# Patient Record
Sex: FEMALE | Race: WHITE | NOT HISPANIC OR LATINO | Employment: OTHER | URBAN - METROPOLITAN AREA
[De-identification: names, ages, dates, MRNs, and addresses within clinical notes are randomized per-mention and may not be internally consistent; named-entity substitution may affect disease eponyms.]

---

## 2017-01-03 ENCOUNTER — OFFICE VISIT (OUTPATIENT)
Dept: RHEUMATOLOGY | Facility: CLINIC | Age: 67
End: 2017-01-03
Payer: MEDICARE

## 2017-01-03 VITALS
DIASTOLIC BLOOD PRESSURE: 80 MMHG | HEART RATE: 72 BPM | WEIGHT: 197.88 LBS | HEIGHT: 66 IN | SYSTOLIC BLOOD PRESSURE: 130 MMHG | BODY MASS INDEX: 31.8 KG/M2

## 2017-01-03 DIAGNOSIS — M79.7 FIBROMYALGIA: ICD-10-CM

## 2017-01-03 DIAGNOSIS — M19.90 OTHER TYPE OF OSTEOARTHRITIS, UNSPECIFIED SITE: ICD-10-CM

## 2017-01-03 DIAGNOSIS — L40.50 PSORIATIC ARTHRITIS: ICD-10-CM

## 2017-01-03 DIAGNOSIS — D84.9 IMMUNE DEFICIENCY DISORDER: ICD-10-CM

## 2017-01-03 DIAGNOSIS — M19.90 INFLAMMATORY ARTHRITIS: Primary | ICD-10-CM

## 2017-01-03 DIAGNOSIS — M02.39 REITER'S DISEASE OF MULTIPLE SITES: ICD-10-CM

## 2017-01-03 PROCEDURE — 1159F MED LIST DOCD IN RCRD: CPT | Mod: S$GLB,,, | Performed by: INTERNAL MEDICINE

## 2017-01-03 PROCEDURE — 99214 OFFICE O/P EST MOD 30 MIN: CPT | Mod: S$GLB,,, | Performed by: INTERNAL MEDICINE

## 2017-01-03 PROCEDURE — 99999 PR PBB SHADOW E&M-EST. PATIENT-LVL III: CPT | Mod: PBBFAC,,, | Performed by: INTERNAL MEDICINE

## 2017-01-03 RX ORDER — FOLIC ACID 1 MG/1
1 TABLET ORAL DAILY
Qty: 30 TABLET | Refills: 5 | Status: SHIPPED | OUTPATIENT
Start: 2017-01-03 | End: 2018-10-29

## 2017-01-03 ASSESSMENT — ROUTINE ASSESSMENT OF PATIENT INDEX DATA (RAPID3)
PSYCHOLOGICAL DISTRESS SCORE: 4.4
PATIENT GLOBAL ASSESSMENT SCORE: 3
TOTAL RAPID3 SCORE: 3.78
PAIN SCORE: 5
MDHAQ FUNCTION SCORE: 1

## 2017-01-03 NOTE — PROGRESS NOTES
Subjective:          Chief Complaint: Adina Li is a 66 y.o. female who had concerns including Disease Management.    HPI:    Patient is a 66-year-old female here for f/u of PsA vs EOA (+hx of psoriasis but no active plaques) and fibromyalgia.  Serologies: CCP negative,  rheumatoid factor negative, CRP and ESR within normal limits. iron and TIBC were normal ferritin was normal and hepatitis was normal.  His last visit I did start her on hydroxychloroquinedoing well having pain erythema at right 3rd and 4th DIP with slight swelling.   Imaging with erosive changes at the capitate and DIP.        She previously started on methotrexate of 5 tabs weekly as well as Savella.She was on MTX years ago with little note of improvement.   Is a past medical history for type 2 diabetes mellitus, hypertension, IBS, previous myocardial infarction and diverticulitis. She has DIP pain, swelling and deformity. Some PIP, little MCP and some wrist tenderness. She has long hx of arthritis with bilateral TKA. She has hx of Lumbar DDD/DJD and cervical DDD with [facility]-Dr. Sim. Patient notes this started as a young woman 20s and 30s. She was rather active. She has some pain stiffness in shoulders. Currently right ring finger burns and stiff. Described as ache. he is taking Advil PM helps with pain and for sleep, chondroitin sulfate or move free. Recently started [place] water and feeling much better. She is s/p CAD with stenting x 2 and sister with MI  at 45y/o. She is cautious with NSAIDs. Patient with some dry mouth, no dry eyes, no serositis, pleurisy, ILD. ? Psoriasis scalp.  Patient is a history of iron overload with therapeutic phlebotomies.  Cannot recall she was diagnosed with hemochromatosis.  She does recall hepatitis exposure.  She denies a malar rash discoid lesions oral palatal ulcerations no unexplained anemias.    Reviewed imaging in detail.     Component      Latest Ref Rng & Units 2016   Hepatitis B Surface Ag    "    Negative   Hep B C IgM       Negative   Hep A IgM       Negative   Hepatitis C Ab       Negative   Sed Rate      0 - 20 mm/Hr 5   CRP      0.0 - 8.2 mg/L 2.0   CCP Antibodies      <5.0 U/mL <0.5       REVIEW OF SYSTEMS:    Review of Systems   Constitutional: Negative for fever, malaise/fatigue and weight loss.   HENT: Negative for sore throat.    Eyes: Negative for double vision, photophobia and redness.   Respiratory: Negative for cough, shortness of breath and wheezing.    Cardiovascular: Negative for chest pain, palpitations and orthopnea.   Gastrointestinal: Negative for abdominal pain, constipation and diarrhea.   Genitourinary: Negative for dysuria, hematuria and urgency.   Musculoskeletal: Positive for back pain and joint pain. Negative for myalgias.   Skin: Negative for rash.   Neurological: Negative for dizziness, tingling, focal weakness and headaches.   Endo/Heme/Allergies: Does not bruise/bleed easily.   Psychiatric/Behavioral: Negative for depression, hallucinations and suicidal ideas.               Objective:            Past Medical History   Diagnosis Date    Acid reflux     Anxiety     Arthritis     Diabetes mellitus     Fibromyalgia     Hypertension     IBS (irritable bowel syndrome)     Myocardial infarction     Ulcerative colitis      History reviewed. No pertinent family history.  Social History   Substance Use Topics    Smoking status: Former Smoker     Quit date: 5/8/2005    Smokeless tobacco: None    Alcohol use No         Current Outpatient Prescriptions on File Prior to Visit   Medication Sig Dispense Refill    alprazolam (XANAX) 0.5 MG tablet Take 0.5 mg by mouth nightly as needed for Insomnia or Anxiety.      aspirin 81 MG Chew Take 81 mg by mouth once daily.      BD INSULIN PEN NEEDLE UF MINI 31 x 3/16 " Ndle   11    cinnamon bark (CINNAMON) 500 mg capsule Take 500 mg by mouth once daily.      co-enzyme Q-10 30 mg capsule Take 100 mg by mouth once daily.      " cranberry 500 mg Cap Take 500 mg by mouth once daily.      diphenoxylate-atropine 2.5-0.025 mg (LOMOTIL) 2.5-0.025 mg per tablet   1    duloxetine (CYMBALTA) 30 MG capsule Take 1 capsule (30 mg total) by mouth once daily. At night 30 capsule 11    estradiol (CLIMARA) 0.075 mg/24 hr   4    ferrous sulfate 325 mg (65 mg iron) Tab tablet Take 325 mg by mouth once daily.      folic acid (FOLVITE) 1 MG tablet Take 1 tablet (1 mg total) by mouth once daily. 30 tablet 5    gabapentin (NEURONTIN) 300 MG capsule Take 300 mg by mouth 2 (two) times daily.      glimepiride (AMARYL) 4 MG tablet Take 4 mg by mouth 2 (two) times daily.      glucosamine-chondroitin 500-400 mg tablet Take 1 tablet by mouth once daily.      hydrocodone-acetaminophen 5-325mg (NORCO) 5-325 mg per tablet Take 1 tablet by mouth nightly as needed for Pain.      insulin glargine (LANTUS) 100 unit/mL injection Inject 24 Units into the skin once daily.       insulin lispro (HUMALOG) 100 unit/mL injection Inject 2 Units into the skin as needed for High Blood Sugar.      krill-om3-dha-epa-om6-lip-astx (KRILL OIL, OMEGA 3 AND 6,) 1,500-165-67.5 mg Cap Take by mouth once daily.      metformin (GLUCOPHAGE) 1000 MG tablet Take 1,000 mg by mouth 2 (two) times daily with meals.      metoprolol succinate (TOPROL-XL) 25 MG 24 hr tablet Take 25 mg by mouth once daily.      multivitamin (THERAGRAN) per tablet Take 1 tablet by mouth once daily.      pitavastatin (LIVALO) 4 mg Tab Take by mouth once daily.      sitagliptin (JANUVIA) 100 MG Tab Take 100 mg by mouth once daily.      solifenacin (VESICARE) 10 MG tablet Take 5 mg by mouth every evening.      thyroid 30 mg Tab Take 30 mg by mouth once daily.      vitamin D 1000 units Tab Take 185 mg by mouth once daily.       No current facility-administered medications on file prior to visit.        Vitals:    01/03/17 0747   BP: 130/80   Pulse: 72       Physical Exam:    Physical Exam   Constitutional:  She appears well-developed and well-nourished.   HENT:   Nose: No septal deviation.   Mouth/Throat: Mucous membranes are normal. No oral lesions.   Eyes: Pupils are equal, round, and reactive to light. Right conjunctiva is not injected. Left conjunctiva is not injected.   Neck: No JVD present. No thyroid mass and no thyromegaly present.   Cardiovascular: Normal rate, regular rhythm and normal pulses.    No edema   Pulmonary/Chest: Effort normal and breath sounds normal.   Abdominal: Soft. Normal appearance. There is no hepatosplenomegaly.   Musculoskeletal:        Right shoulder: She exhibits normal range of motion, no tenderness and no swelling.        Left shoulder: She exhibits normal range of motion, no tenderness and no swelling.        Right elbow: She exhibits normal range of motion and no swelling. No tenderness found.        Left elbow: She exhibits normal range of motion and no swelling. No tenderness found.        Right wrist: She exhibits normal range of motion, no tenderness and no swelling.        Left wrist: She exhibits normal range of motion, no tenderness and no swelling.        Right hip: She exhibits normal range of motion, normal strength and no swelling.        Left hip: She exhibits normal range of motion, no tenderness and no swelling.        Right knee: She exhibits normal range of motion and no swelling. No tenderness found.        Left knee: She exhibits normal range of motion and no swelling. No tenderness found.        Right ankle: She exhibits normal range of motion and no swelling. No tenderness.        Left ankle: She exhibits normal range of motion and no swelling. No tenderness.        Right hand: She exhibits decreased range of motion, tenderness and deformity.        Left hand: She exhibits decreased range of motion, tenderness and deformity.        Left foot: There is tenderness and swelling.   Patient is a DIP bony hypertrophy with deformity particular the second DIP  bilaterally.  She has slight erythema at the third fourth and fifth right DIP she's difficulty with finger curl  strength reduced . no active synovitis in the wrist MCPs or PIPs.    Midfoot dorsal extensor tendons slight swelling and tenderness   Lymphadenopathy:     She has no cervical adenopathy.     She has no axillary adenopathy.   Neurological: She has normal strength and normal reflexes.   Skin: Skin is dry and intact.   Psychiatric: She has a normal mood and affect.             Assessment:       Encounter Diagnoses   Name Primary?    Inflammatory arthritis Yes    Other type of osteoarthritis, unspecified site     Cady's disease of multiple sites     Fibromyalgia     Immune deficiency disorder     Psoriatic arthritis           Plan:        Inflammatory arthritis  -     methotrexate 10 MG tablet; Take 1 tablet (10 mg total) by mouth once a week.  Dispense: 4 tablet; Refill: 4  -     folic acid (FOLVITE) 1 MG tablet; Take 1 tablet (1 mg total) by mouth once daily.  Dispense: 30 tablet; Refill: 5  -     CBC auto differential; Standing; Expected date: 1/3/17  -     Comprehensive metabolic panel; Standing; Expected date: 1/3/17  -     Sedimentation rate, manual; Standing; Expected date: 1/3/17  -     C-reactive protein; Standing; Expected date: 1/3/17    Other type of osteoarthritis, unspecified site  -     folic acid (FOLVITE) 1 MG tablet; Take 1 tablet (1 mg total) by mouth once daily.  Dispense: 30 tablet; Refill: 5    Cady's disease of multiple sites    Fibromyalgia    Immune deficiency disorder  -     folic acid (FOLVITE) 1 MG tablet; Take 1 tablet (1 mg total) by mouth once daily.  Dispense: 30 tablet; Refill: 5    Psoriatic arthritis  -     folic acid (FOLVITE) 1 MG tablet; Take 1 tablet (1 mg total) by mouth once daily.  Dispense: 30 tablet; Refill: 5  -     CBC auto differential; Standing; Expected date: 1/3/17  -     Comprehensive metabolic panel; Standing; Expected date: 1/3/17  -      Sedimentation rate, manual; Standing; Expected date: 1/3/17  -     C-reactive protein; Standing; Expected date: 1/3/17    Continue HCQ  Start MTX for persistent swelling DIP joints/suspect Psoriatic arthritis  EMLA  Did not offer significant relief.     Return in about 3 months (around 4/3/2017).          30min consultation with greater than 50% spent in counseling, chart review and coordination of care. All questions answered.

## 2017-01-03 NOTE — MR AVS SNAPSHOT
[facility]  [address] [facility] [address] [place] [address]  Phone: [phone]  Fax: [phone]                  Aidna Li   1/3/2017 8:00 AM   Office Visit    Description:  Female : 1950   Provider:  Beatriz Guevara DO   Department:  [facility] - Rheumatology           Reason for Visit     Disease Management           Diagnoses this Visit        Comments    Inflammatory arthritis    -  Primary     Other type of osteoarthritis, unspecified site         Cady's disease of multiple sites         Fibromyalgia         Immune deficiency disorder         Psoriatic arthritis                To Do List           Future Appointments        Provider Department Dept Phone    3/1/2017 10:20 AM LABORATORY, [place] [facility] [phone]    4/3/2017 8:30 AM Beatriz Guevara DO [facility] [phone]    2017 10:00 AM LABORATORY, [place] [facility] [phone]      Goals (5 Years of Data)     None      Follow-Up and Disposition     Return in about 3 months (around 4/3/2017).       These Medications        Disp Refills Start End    methotrexate 10 MG tablet 4 tablet 4 1/3/2017 1/3/2018    Take 1 tablet (10 mg total) by mouth once a week. - Oral    Pharmacy: [facility] [place], [place] - [address] [address] Ph #: [phone]       folic acid (FOLVITE) 1 MG tablet 30 tablet 5 1/3/2017 1/3/2018    Take 1 tablet (1 mg total) by mouth once daily. - Oral    Pharmacy: [facility] [place]  [address] Ph #: [phone]         [facility]     [facility] -  Assistance  Registered nurses in the [facility] provide clinical advisement, health education, appointment booking, and other advisory services.  Call for this free service at [phone].             Medications           Message regarding Medications     Verify the changes and/or additions to your  "medication regime listed below are the same as discussed with your clinician today.  If any of these changes or additions are incorrect, please notify your healthcare provider.        START taking these NEW medications        Refills    methotrexate 10 MG tablet 4    Sig: Take 1 tablet (10 mg total) by mouth once a week.    Class: Normal    Route: Oral           Verify that the below list of medications is an accurate representation of the medications you are currently taking.  If none reported, the list may be blank. If incorrect, please contact your healthcare provider. Carry this list with you in case of emergency.           Current Medications     alprazolam (XANAX) 0.5 MG tablet Take 0.5 mg by mouth nightly as needed for Insomnia or Anxiety.    aspirin 81 MG Chew Take 81 mg by mouth once daily.    BD INSULIN PEN NEEDLE UF MINI 31 x 3/16 " Ndle     cinnamon bark (CINNAMON) 500 mg capsule Take 500 mg by mouth once daily.    co-enzyme Q-10 30 mg capsule Take 100 mg by mouth once daily.    cranberry 500 mg Cap Take 500 mg by mouth once daily.    diphenoxylate-atropine 2.5-0.025 mg (LOMOTIL) 2.5-0.025 mg per tablet     duloxetine (CYMBALTA) 30 MG capsule Take 1 capsule (30 mg total) by mouth once daily. At night    estradiol (CLIMARA) 0.075 mg/24 hr     ferrous sulfate 325 mg (65 mg iron) Tab tablet Take 325 mg by mouth once daily.    folic acid (FOLVITE) 1 MG tablet Take 1 tablet (1 mg total) by mouth once daily.    gabapentin (NEURONTIN) 300 MG capsule Take 300 mg by mouth 2 (two) times daily.    glimepiride (AMARYL) 4 MG tablet Take 4 mg by mouth 2 (two) times daily.    glucosamine-chondroitin 500-400 mg tablet Take 1 tablet by mouth once daily.    hydrocodone-acetaminophen 5-325mg (NORCO) 5-325 mg per tablet Take 1 tablet by mouth nightly as needed for Pain.    insulin glargine (LANTUS) 100 unit/mL injection Inject 24 Units into the skin once daily.     insulin lispro (HUMALOG) 100 unit/mL injection Inject 2 " "Units into the skin as needed for High Blood Sugar.    krill-om3-dha-epa-om6-lip-astx (KRILL OIL, OMEGA 3 AND 6,) 1,500-165-67.5 mg Cap Take by mouth once daily.    metformin (GLUCOPHAGE) 1000 MG tablet Take 1,000 mg by mouth 2 (two) times daily with meals.    methotrexate 10 MG tablet Take 1 tablet (10 mg total) by mouth once a week.    metoprolol succinate (TOPROL-XL) 25 MG 24 hr tablet Take 25 mg by mouth once daily.    multivitamin (THERAGRAN) per tablet Take 1 tablet by mouth once daily.    pitavastatin (LIVALO) 4 mg Tab Take by mouth once daily.    sitagliptin (JANUVIA) 100 MG Tab Take 100 mg by mouth once daily.    solifenacin (VESICARE) 10 MG tablet Take 5 mg by mouth every evening.    thyroid 30 mg Tab Take 30 mg by mouth once daily.    vitamin D 1000 units Tab Take 185 mg by mouth once daily.           Clinical Reference Information           Vital Signs - Last Recorded  Most recent update: 1/3/2017  7:55 AM by Nhi Pacheco LPN    BP Pulse Ht Wt BMI    130/80 72 5' 5.5" (1.664 m) 89.8 kg (197 lb 14.4 oz) 32.43 kg/m2      Blood Pressure          Most Recent Value    BP  130/80      Allergies as of 1/3/2017     Sulfa (Sulfonamide Antibiotics)      Immunizations Administered on Date of Encounter - 1/3/2017     None      Orders Placed During Today's Visit     Recurring Lab Work Interval Expires    C-reactive protein   1/3/2018    CBC auto differential   1/3/2018    Comprehensive metabolic panel   1/3/2018    Sedimentation rate, manual   1/3/2018      [facility] Sign-Up     Activating your [facility] account is as easy as 1-2-3!     1) Visit [email or web address], select Sign Up Now, enter this activation code and your date of birth, then select Next.  [email or web address]  Expires: 2/17/2017  8:53 AM      2) Create a username and password to use when you visit [facility] in the future and select a security question in case you lose your password and select Next.    3) Enter your e-mail address and click Sign " Up!    Additional Information  If you have questions, please e-mail [email or web address] or call [phone] to talk to our [facility] staff. Remember, [facility] is NOT to be used for urgent needs. For medical emergencies, dial 911.

## 2017-03-01 ENCOUNTER — LAB VISIT (OUTPATIENT)
Dept: LAB | Facility: HOSPITAL | Age: 67
End: 2017-03-01
Attending: INTERNAL MEDICINE
Payer: MEDICARE

## 2017-03-01 DIAGNOSIS — M19.90 INFLAMMATORY ARTHRITIS: ICD-10-CM

## 2017-03-01 DIAGNOSIS — L40.50 PSORIATIC ARTHRITIS: ICD-10-CM

## 2017-03-01 LAB
ALBUMIN SERPL BCP-MCNC: 3.7 G/DL
ALP SERPL-CCNC: 43 U/L
ALT SERPL W/O P-5'-P-CCNC: 33 U/L
ANION GAP SERPL CALC-SCNC: 10 MMOL/L
AST SERPL-CCNC: 29 U/L
BASOPHILS # BLD AUTO: 0.05 K/UL
BASOPHILS NFR BLD: 0.8 %
BILIRUB SERPL-MCNC: 0.6 MG/DL
BUN SERPL-MCNC: 10 MG/DL
CALCIUM SERPL-MCNC: 9.1 MG/DL
CHLORIDE SERPL-SCNC: 99 MMOL/L
CO2 SERPL-SCNC: 24 MMOL/L
CREAT SERPL-MCNC: 0.8 MG/DL
CRP SERPL-MCNC: 1.4 MG/L
DIFFERENTIAL METHOD: NORMAL
EOSINOPHIL # BLD AUTO: 0.2 K/UL
EOSINOPHIL NFR BLD: 2.8 %
ERYTHROCYTE [DISTWIDTH] IN BLOOD BY AUTOMATED COUNT: 13.4 %
ERYTHROCYTE [SEDIMENTATION RATE] IN BLOOD BY WESTERGREN METHOD: 8 MM/HR
EST. GFR  (AFRICAN AMERICAN): >60 ML/MIN/1.73 M^2
EST. GFR  (NON AFRICAN AMERICAN): >60 ML/MIN/1.73 M^2
GLUCOSE SERPL-MCNC: 169 MG/DL
HCT VFR BLD AUTO: 40.5 %
HGB BLD-MCNC: 13.9 G/DL
LYMPHOCYTES # BLD AUTO: 1.9 K/UL
LYMPHOCYTES NFR BLD: 28.7 %
MCH RBC QN AUTO: 30.7 PG
MCHC RBC AUTO-ENTMCNC: 34.3 %
MCV RBC AUTO: 89 FL
MONOCYTES # BLD AUTO: 0.7 K/UL
MONOCYTES NFR BLD: 10.1 %
NEUTROPHILS # BLD AUTO: 3.7 K/UL
NEUTROPHILS NFR BLD: 57.3 %
PLATELET # BLD AUTO: 279 K/UL
PMV BLD AUTO: 11 FL
POTASSIUM SERPL-SCNC: 4.5 MMOL/L
PROT SERPL-MCNC: 7.2 G/DL
RBC # BLD AUTO: 4.53 M/UL
SODIUM SERPL-SCNC: 133 MMOL/L
WBC # BLD AUTO: 6.44 K/UL

## 2017-03-01 PROCEDURE — 86140 C-REACTIVE PROTEIN: CPT

## 2017-03-01 PROCEDURE — 85025 COMPLETE CBC W/AUTO DIFF WBC: CPT

## 2017-03-01 PROCEDURE — 80053 COMPREHEN METABOLIC PANEL: CPT

## 2017-03-01 PROCEDURE — 36415 COLL VENOUS BLD VENIPUNCTURE: CPT | Mod: PO

## 2017-03-01 PROCEDURE — 85651 RBC SED RATE NONAUTOMATED: CPT | Mod: PO

## 2017-03-15 ENCOUNTER — TELEPHONE (OUTPATIENT)
Dept: RHEUMATOLOGY | Facility: CLINIC | Age: 67
End: 2017-03-15

## 2017-03-15 NOTE — TELEPHONE ENCOUNTER
----- Message from Cynthia Alvarado sent at 3/15/2017  9:41 AM CDT -----  Contact: Adina  Patient missed a phone call but said she did not have a message. If she was called by this office please [phone] (home) thank you!

## 2017-04-03 ENCOUNTER — OFFICE VISIT (OUTPATIENT)
Dept: RHEUMATOLOGY | Facility: CLINIC | Age: 67
End: 2017-04-03
Payer: MEDICARE

## 2017-04-03 VITALS
BODY MASS INDEX: 31.43 KG/M2 | SYSTOLIC BLOOD PRESSURE: 123 MMHG | DIASTOLIC BLOOD PRESSURE: 68 MMHG | HEART RATE: 65 BPM | WEIGHT: 191.81 LBS

## 2017-04-03 DIAGNOSIS — M19.90 INFLAMMATORY ARTHRITIS: ICD-10-CM

## 2017-04-03 DIAGNOSIS — M19.90 OTHER TYPE OF OSTEOARTHRITIS, UNSPECIFIED SITE: ICD-10-CM

## 2017-04-03 DIAGNOSIS — Z79.899 HIGH RISK MEDICATIONS (NOT ANTICOAGULANTS) LONG-TERM USE: Primary | ICD-10-CM

## 2017-04-03 DIAGNOSIS — M79.7 FIBROMYALGIA: ICD-10-CM

## 2017-04-03 PROCEDURE — 1160F RVW MEDS BY RX/DR IN RCRD: CPT | Mod: S$GLB,,, | Performed by: INTERNAL MEDICINE

## 2017-04-03 PROCEDURE — 99214 OFFICE O/P EST MOD 30 MIN: CPT | Mod: S$GLB,,, | Performed by: INTERNAL MEDICINE

## 2017-04-03 PROCEDURE — 99999 PR PBB SHADOW E&M-EST. PATIENT-LVL III: CPT | Mod: PBBFAC,,, | Performed by: INTERNAL MEDICINE

## 2017-04-03 RX ORDER — PRAVASTATIN SODIUM 20 MG/1
20 TABLET ORAL DAILY
COMMUNITY
Start: 2017-03-21

## 2017-04-03 RX ORDER — NITROGLYCERIN 0.4 MG/1
TABLET SUBLINGUAL
Refills: 1 | COMMUNITY
Start: 2017-01-11

## 2017-04-03 RX ORDER — INSULIN ASPART 100 [IU]/ML
INJECTION, SOLUTION INTRAVENOUS; SUBCUTANEOUS
COMMUNITY
Start: 2017-03-28 | End: 2017-04-03

## 2017-04-03 RX ORDER — LEFLUNOMIDE 10 MG/1
10 TABLET ORAL DAILY
Qty: 90 TABLET | Refills: 3 | Status: SHIPPED | OUTPATIENT
Start: 2017-04-03 | End: 2017-04-03 | Stop reason: SDUPTHER

## 2017-04-03 RX ORDER — LEFLUNOMIDE 10 MG/1
10 TABLET ORAL DAILY
Qty: 30 TABLET | Refills: 3 | Status: SHIPPED | OUTPATIENT
Start: 2017-04-03 | End: 2017-07-06 | Stop reason: SDUPTHER

## 2017-04-03 RX ORDER — NABUMETONE 500 MG/1
500 TABLET, FILM COATED ORAL DAILY
COMMUNITY
Start: 2017-02-01 | End: 2018-10-29

## 2017-04-03 RX ORDER — VENLAFAXINE HYDROCHLORIDE 37.5 MG/1
37.5 CAPSULE, EXTENDED RELEASE ORAL DAILY
Qty: 30 CAPSULE | Refills: 11 | Status: SHIPPED | OUTPATIENT
Start: 2017-04-03 | End: 2017-07-06 | Stop reason: CLARIF

## 2017-04-03 RX ORDER — VITAMIN B COMPLEX
1 CAPSULE ORAL DAILY
COMMUNITY
End: 2018-10-29

## 2017-04-03 RX ORDER — BLOOD SUGAR DIAGNOSTIC
STRIP MISCELLANEOUS
Refills: 3 | COMMUNITY
Start: 2017-03-21 | End: 2017-11-01 | Stop reason: SDUPTHER

## 2017-04-03 ASSESSMENT — ROUTINE ASSESSMENT OF PATIENT INDEX DATA (RAPID3)
PATIENT GLOBAL ASSESSMENT SCORE: 3
FATIGUE SCORE: 3
WHEN YOU AWAKENED IN THE MORNING OVER THE LAST WEEK, PLEASE INDICATE THE AMOUNT OF TIME IT TAKES UNTIL YOU ARE AS LIMBER AS YOU WILL BE FOR THE DAY: ONE HOUR AFTER WAKING
PAIN SCORE: 8
PSYCHOLOGICAL DISTRESS SCORE: 1.1
MDHAQ FUNCTION SCORE: 1.3
AM STIFFNESS SCORE: 1, YES
TOTAL RAPID3 SCORE: 5.11

## 2017-04-03 NOTE — PATIENT INSTRUCTIONS
Stop MTX Today.    If you receive new medication Leflunomide then next labs will be due 8 weeks after the start.     If it is too costly plan to wait until July 2017 with new insurance.    I stopped Cymbalta and wrote for Effexor the minute you receive just stop Cymbalta and start ne med.

## 2017-04-03 NOTE — PROGRESS NOTES
Subjective:          Chief Complaint: Adina Li is a 66 y.o. female who had concerns including Follow-up.    HPI:    Insurance requesting change of Cymbalta for which she has been on for over 1 year.   Insurance requesting change of gabapentin of which there is no cheaper alternative    [facility] [place] with cardiac work up no occlusive CAD.    Not noting significant benefit with MTX since start. No toxicity on recent labs. Fingers still burn.     REVIEW OF SYSTEMS:    Review of Systems   Constitutional: Negative for fever, malaise/fatigue and weight loss.   HENT: Negative for sore throat.    Eyes: Negative for double vision, photophobia and redness.   Respiratory: Negative for cough, shortness of breath and wheezing.    Cardiovascular: Negative for chest pain, palpitations and orthopnea.   Gastrointestinal: Negative for abdominal pain, constipation and diarrhea.   Genitourinary: Negative for dysuria, hematuria and urgency.   Musculoskeletal: Positive for back pain and joint pain. Negative for myalgias.   Skin: Negative for rash.   Neurological: Negative for dizziness, tingling, focal weakness and headaches.   Endo/Heme/Allergies: Does not bruise/bleed easily.   Psychiatric/Behavioral: Negative for depression, hallucinations and suicidal ideas.               Objective:            Past Medical History:   Diagnosis Date    Acid reflux     Anxiety     Arthritis     Diabetes mellitus     Fibromyalgia     Hypertension     IBS (irritable bowel syndrome)     Myocardial infarction     Ulcerative colitis      No family history on file.  Social History   Substance Use Topics    Smoking status: Former Smoker     Quit date: 5/8/2005    Smokeless tobacco: None    Alcohol use No         Current Outpatient Prescriptions on File Prior to Visit   Medication Sig Dispense Refill    alprazolam (XANAX) 0.5 MG tablet Take 0.5 mg by mouth nightly as needed for Insomnia or Anxiety.      aspirin 81 MG Chew Take 81 mg by mouth  "once daily.      BD INSULIN PEN NEEDLE UF MINI 31 x 3/16 " Ndle   11    cinnamon bark (CINNAMON) 500 mg capsule Take 500 mg by mouth once daily.      co-enzyme Q-10 30 mg capsule Take 100 mg by mouth once daily.      cranberry 500 mg Cap Take 500 mg by mouth once daily.      diphenoxylate-atropine 2.5-0.025 mg (LOMOTIL) 2.5-0.025 mg per tablet   1    estradiol (CLIMARA) 0.075 mg/24 hr   4    ferrous sulfate 325 mg (65 mg iron) Tab tablet Take 325 mg by mouth once daily.      folic acid (FOLVITE) 1 MG tablet Take 1 tablet (1 mg total) by mouth once daily. 30 tablet 5    gabapentin (NEURONTIN) 300 MG capsule Take 300 mg by mouth 2 (two) times daily.      glucosamine-chondroitin 500-400 mg tablet Take 1 tablet by mouth once daily.      hydrocodone-acetaminophen 5-325mg (NORCO) 5-325 mg per tablet Take 1 tablet by mouth nightly as needed for Pain.      krill-om3-dha-epa-om6-lip-astx (KRILL OIL, OMEGA 3 AND 6,) 1,500-165-67.5 mg Cap Take by mouth once daily.      metformin (GLUCOPHAGE) 1000 MG tablet Take 1,000 mg by mouth 2 (two) times daily with meals.      methotrexate 10 MG tablet Take 1 tablet (10 mg total) by mouth once a week. 4 tablet 4    metoprolol succinate (TOPROL-XL) 25 MG 24 hr tablet Take 25 mg by mouth once daily.      multivitamin (THERAGRAN) per tablet Take 1 tablet by mouth once daily.      sitagliptin (JANUVIA) 100 MG Tab Take 100 mg by mouth once daily.      solifenacin (VESICARE) 10 MG tablet Take 10 mg by mouth every evening.       thyroid 30 mg Tab Take 30 mg by mouth once daily.      vitamin D 1000 units Tab Take 185 mg by mouth once daily.      duloxetine (CYMBALTA) 30 MG capsule Take 1 capsule (30 mg total) by mouth once daily. At night 30 capsule 11    [DISCONTINUED] glimepiride (AMARYL) 4 MG tablet Take 4 mg by mouth 2 (two) times daily.      [DISCONTINUED] insulin glargine (LANTUS) 100 unit/mL injection Inject 24 Units into the skin once daily.       [DISCONTINUED] " insulin lispro (HUMALOG) 100 unit/mL injection Inject 2 Units into the skin as needed for High Blood Sugar.      [DISCONTINUED] pitavastatin (LIVALO) 4 mg Tab Take by mouth once daily.       No current facility-administered medications on file prior to visit.        Vitals:    04/03/17 0810   BP: 123/68   Pulse: 65       Physical Exam:    Physical Exam   Constitutional: She appears well-developed and well-nourished.   HENT:   Nose: No septal deviation.   Mouth/Throat: Mucous membranes are normal. No oral lesions.   Eyes: Pupils are equal, round, and reactive to light. Right conjunctiva is not injected. Left conjunctiva is not injected.   Neck: No JVD present. No thyroid mass and no thyromegaly present.   Cardiovascular: Normal rate, regular rhythm and normal pulses.    No edema   Pulmonary/Chest: Effort normal and breath sounds normal.   Abdominal: Soft. Normal appearance. There is no hepatosplenomegaly.   Musculoskeletal:        Right shoulder: She exhibits normal range of motion, no tenderness and no swelling.        Left shoulder: She exhibits normal range of motion, no tenderness and no swelling.        Right elbow: She exhibits normal range of motion and no swelling. No tenderness found.        Left elbow: She exhibits normal range of motion and no swelling. No tenderness found.        Right wrist: She exhibits normal range of motion, no tenderness and no swelling.        Left wrist: She exhibits normal range of motion, no tenderness and no swelling.        Right hip: She exhibits normal range of motion, normal strength and no swelling.        Left hip: She exhibits normal range of motion, no tenderness and no swelling.        Right knee: She exhibits normal range of motion and no swelling. No tenderness found.        Left knee: She exhibits normal range of motion and no swelling. No tenderness found.        Right ankle: She exhibits normal range of motion and no swelling. No tenderness.        Left ankle: She  exhibits normal range of motion and no swelling. No tenderness.        Right hand: She exhibits decreased range of motion, tenderness and deformity.        Left hand: She exhibits decreased range of motion, tenderness and deformity.        Left foot: There is tenderness and swelling.   Patient is a DIP bony hypertrophy with deformity particular the second DIP bilaterally.  She has slight erythema at the third fourth and fifth right DIP she's difficulty with finger curl  strength reduced . no active synovitis in the wrist MCPs or PIPs.    Midfoot dorsal extensor tendons slight swelling and tenderness   Lymphadenopathy:     She has no cervical adenopathy.     She has no axillary adenopathy.   Neurological: She has normal strength and normal reflexes.   Skin: Skin is dry and intact.   Psychiatric: She has a normal mood and affect.             Assessment:       Encounter Diagnoses   Name Primary?    Inflammatory arthritis Yes    Fibromyalgia     Other type of osteoarthritis, unspecified site           Plan:        Inflammatory arthritis    Fibromyalgia    Other type of osteoarthritis, unspecified site    Patient needing adjustment in her Cymbalta onto Effexor insurance not covering  She thinks they were asking about changing her gabapentin that I can't think of anything that would be more affordable as Lyrica is significantly more expensive.  With regard to her inflammatory arthritis not having much benefit with the addition of MTX will dc and see if we can get LFA approved.   If not cost effecitve will wait until July when insurance changes.  Labs will need to be done 6 weeks after start of LFA and she will let us know when she starts this.   Cancelled her next lab appt in April as DC MTX.     Return in about 4 months (around 8/3/2017).          30min consultation with greater than 50% spent in counseling, chart review and coordination of care. All questions answered.

## 2017-06-28 ENCOUNTER — LAB VISIT (OUTPATIENT)
Dept: LAB | Facility: HOSPITAL | Age: 67
End: 2017-06-28
Attending: INTERNAL MEDICINE
Payer: MEDICARE

## 2017-06-28 DIAGNOSIS — Z79.899 HIGH RISK MEDICATIONS (NOT ANTICOAGULANTS) LONG-TERM USE: ICD-10-CM

## 2017-06-28 DIAGNOSIS — M19.90 INFLAMMATORY ARTHRITIS: ICD-10-CM

## 2017-06-28 LAB
ALBUMIN SERPL BCP-MCNC: 3.5 G/DL
ALP SERPL-CCNC: 69 U/L
ALT SERPL W/O P-5'-P-CCNC: 28 U/L
ANION GAP SERPL CALC-SCNC: 13 MMOL/L
AST SERPL-CCNC: 24 U/L
BASOPHILS # BLD AUTO: 0.03 K/UL
BASOPHILS NFR BLD: 0.4 %
BILIRUB SERPL-MCNC: 0.4 MG/DL
BUN SERPL-MCNC: 16 MG/DL
CALCIUM SERPL-MCNC: 9.6 MG/DL
CHLORIDE SERPL-SCNC: 99 MMOL/L
CO2 SERPL-SCNC: 25 MMOL/L
CREAT SERPL-MCNC: 0.8 MG/DL
CRP SERPL-MCNC: 1.4 MG/L
DIFFERENTIAL METHOD: NORMAL
EOSINOPHIL # BLD AUTO: 0.2 K/UL
EOSINOPHIL NFR BLD: 3.3 %
ERYTHROCYTE [DISTWIDTH] IN BLOOD BY AUTOMATED COUNT: 13 %
ERYTHROCYTE [SEDIMENTATION RATE] IN BLOOD BY WESTERGREN METHOD: 5 MM/HR
EST. GFR  (AFRICAN AMERICAN): >60 ML/MIN/1.73 M^2
EST. GFR  (NON AFRICAN AMERICAN): >60 ML/MIN/1.73 M^2
GLUCOSE SERPL-MCNC: 216 MG/DL
HCT VFR BLD AUTO: 44.2 %
HGB BLD-MCNC: 14.9 G/DL
LYMPHOCYTES # BLD AUTO: 1.9 K/UL
LYMPHOCYTES NFR BLD: 27.7 %
MCH RBC QN AUTO: 30.7 PG
MCHC RBC AUTO-ENTMCNC: 33.7 %
MCV RBC AUTO: 91 FL
MONOCYTES # BLD AUTO: 0.8 K/UL
MONOCYTES NFR BLD: 12.3 %
NEUTROPHILS # BLD AUTO: 3.8 K/UL
NEUTROPHILS NFR BLD: 56.2 %
PLATELET # BLD AUTO: 302 K/UL
PMV BLD AUTO: 11.1 FL
POTASSIUM SERPL-SCNC: 4.8 MMOL/L
PROT SERPL-MCNC: 7.4 G/DL
RBC # BLD AUTO: 4.86 M/UL
SODIUM SERPL-SCNC: 137 MMOL/L
WBC # BLD AUTO: 6.68 K/UL

## 2017-06-28 PROCEDURE — 86140 C-REACTIVE PROTEIN: CPT

## 2017-06-28 PROCEDURE — 80053 COMPREHEN METABOLIC PANEL: CPT

## 2017-06-28 PROCEDURE — 85651 RBC SED RATE NONAUTOMATED: CPT | Mod: PO

## 2017-06-28 PROCEDURE — 85025 COMPLETE CBC W/AUTO DIFF WBC: CPT

## 2017-06-28 PROCEDURE — 36415 COLL VENOUS BLD VENIPUNCTURE: CPT | Mod: PO

## 2017-07-06 ENCOUNTER — OFFICE VISIT (OUTPATIENT)
Dept: RHEUMATOLOGY | Facility: CLINIC | Age: 67
End: 2017-07-06
Payer: MEDICARE

## 2017-07-06 VITALS
DIASTOLIC BLOOD PRESSURE: 80 MMHG | HEIGHT: 66 IN | BODY MASS INDEX: 29.97 KG/M2 | SYSTOLIC BLOOD PRESSURE: 124 MMHG | HEART RATE: 76 BPM | WEIGHT: 186.5 LBS

## 2017-07-06 DIAGNOSIS — Z79.899 HIGH RISK MEDICATIONS (NOT ANTICOAGULANTS) LONG-TERM USE: ICD-10-CM

## 2017-07-06 DIAGNOSIS — L40.50 PSORIATIC ARTHRITIS: ICD-10-CM

## 2017-07-06 DIAGNOSIS — M15.4 EROSIVE OSTEOARTHRITIS: Primary | ICD-10-CM

## 2017-07-06 DIAGNOSIS — E11.40 TYPE 2 DIABETES MELLITUS WITH DIABETIC NEUROPATHY, WITH LONG-TERM CURRENT USE OF INSULIN: ICD-10-CM

## 2017-07-06 DIAGNOSIS — Z79.4 TYPE 2 DIABETES MELLITUS WITH DIABETIC NEUROPATHY, WITH LONG-TERM CURRENT USE OF INSULIN: ICD-10-CM

## 2017-07-06 DIAGNOSIS — M19.90 INFLAMMATORY ARTHRITIS: ICD-10-CM

## 2017-07-06 PROCEDURE — 99999 PR PBB SHADOW E&M-EST. PATIENT-LVL III: CPT | Mod: PBBFAC,,, | Performed by: INTERNAL MEDICINE

## 2017-07-06 PROCEDURE — 99214 OFFICE O/P EST MOD 30 MIN: CPT | Mod: S$PBB,,, | Performed by: INTERNAL MEDICINE

## 2017-07-06 PROCEDURE — 99213 OFFICE O/P EST LOW 20 MIN: CPT | Mod: PBBFAC,PO | Performed by: INTERNAL MEDICINE

## 2017-07-06 PROCEDURE — 4010F ACE/ARB THERAPY RXD/TAKEN: CPT | Mod: ,,, | Performed by: INTERNAL MEDICINE

## 2017-07-06 RX ORDER — VALSARTAN 80 MG/1
80 TABLET ORAL DAILY
COMMUNITY
End: 2018-10-29

## 2017-07-06 RX ORDER — INSULIN GLARGINE 100 [IU]/ML
70 INJECTION, SOLUTION SUBCUTANEOUS DAILY
COMMUNITY
End: 2017-11-28 | Stop reason: CLARIF

## 2017-07-06 RX ORDER — GABAPENTIN 300 MG/1
300 CAPSULE ORAL 2 TIMES DAILY
Qty: 60 CAPSULE | Refills: 3 | Status: SHIPPED | OUTPATIENT
Start: 2017-07-06 | End: 2018-02-09 | Stop reason: SDUPTHER

## 2017-07-06 RX ORDER — INSULIN ASPART 100 [IU]/ML
INJECTION, SUSPENSION SUBCUTANEOUS
COMMUNITY
End: 2017-09-12 | Stop reason: CLARIF

## 2017-07-06 RX ORDER — LEFLUNOMIDE 10 MG/1
10 TABLET ORAL DAILY
Qty: 30 TABLET | Refills: 3 | Status: SHIPPED | OUTPATIENT
Start: 2017-07-06 | End: 2017-07-13 | Stop reason: SDUPTHER

## 2017-07-06 RX ORDER — LEVOTHYROXINE SODIUM 100 UG/1
100 TABLET ORAL
COMMUNITY
End: 2018-01-19

## 2017-07-06 RX ORDER — DULOXETIN HYDROCHLORIDE 60 MG/1
60 CAPSULE, DELAYED RELEASE ORAL DAILY
COMMUNITY
End: 2018-02-09 | Stop reason: SDUPTHER

## 2017-07-06 RX ORDER — HYDROXYCHLOROQUINE SULFATE 200 MG/1
200 TABLET, FILM COATED ORAL 2 TIMES DAILY
COMMUNITY
End: 2017-08-29 | Stop reason: SDUPTHER

## 2017-07-06 ASSESSMENT — ROUTINE ASSESSMENT OF PATIENT INDEX DATA (RAPID3)
TOTAL RAPID3 SCORE: 3.17
MDHAQ FUNCTION SCORE: .9
PATIENT GLOBAL ASSESSMENT SCORE: 3
PAIN SCORE: 3.5
PSYCHOLOGICAL DISTRESS SCORE: 4.4

## 2017-07-06 NOTE — PROGRESS NOTES
Subjective:          Chief Complaint: Adina Li is a 66 y.o. female who had concerns including Disease Management.    HPI:    Patient having diarrhea recently. Medication list reviewed in detail as she appears to be on duplications of medication. We have strongly avoided NSAIDs now she is taking Nabumetone and Meloxicam. HCQ 200mg BID, and recently Arava 10mg daily.     b [place] with cardiac work up no occlusive CAD.    No significant benefit with MTX     REVIEW OF SYSTEMS:    Review of Systems   Constitutional: Negative for fever, malaise/fatigue and weight loss.   HENT: Negative for sore throat.    Eyes: Negative for double vision, photophobia and redness.   Respiratory: Negative for cough, shortness of breath and wheezing.    Cardiovascular: Negative for chest pain, palpitations and orthopnea.   Gastrointestinal: Negative for abdominal pain, constipation and diarrhea.   Genitourinary: Negative for dysuria, hematuria and urgency.   Musculoskeletal: Positive for back pain and joint pain. Negative for myalgias.   Skin: Negative for rash.   Neurological: Negative for dizziness, tingling, focal weakness and headaches.   Endo/Heme/Allergies: Does not bruise/bleed easily.   Psychiatric/Behavioral: Negative for depression, hallucinations and suicidal ideas.               Objective:            Past Medical History:   Diagnosis Date    Acid reflux     Anxiety     Arthritis     Diabetes mellitus     Fibromyalgia     Hypertension     IBS (irritable bowel syndrome)     Myocardial infarction     Ulcerative colitis      History reviewed. No pertinent family history.  Social History   Substance Use Topics    Smoking status: Former Smoker     Quit date: 5/8/2005    Smokeless tobacco: Never Used    Alcohol use No         Current Outpatient Prescriptions on File Prior to Visit   Medication Sig Dispense Refill    ACCU-CHEK DAMIR PLUS TEST STRP Strp TEST BLOOD SUGAR twice daily AND AS NEEDED  3    alprazolam  (XANAX) 0.5 MG tablet Take 0.5 mg by mouth nightly as needed for Insomnia or Anxiety.      aspirin 81 MG Chew Take 81 mg by mouth once daily.      b complex vitamins capsule Take 1 capsule by mouth once daily.      cinnamon bark (CINNAMON) 500 mg capsule Take 500 mg by mouth once daily.      co-enzyme Q-10 30 mg capsule Take 100 mg by mouth once daily.      cranberry 500 mg Cap Take 500 mg by mouth once daily.      CYANOCOBALAMIN, VITAMIN B-12, ORAL Take 1,000 mg by mouth once daily.      diphenoxylate-atropine 2.5-0.025 mg (LOMOTIL) 2.5-0.025 mg per tablet   1    estradiol (CLIMARA) 0.075 mg/24 hr   4    ferrous sulfate 325 mg (65 mg iron) Tab tablet Take 325 mg by mouth once daily.      folic acid (FOLVITE) 1 MG tablet Take 1 tablet (1 mg total) by mouth once daily. 30 tablet 5    gabapentin (NEURONTIN) 300 MG capsule Take 300 mg by mouth 2 (two) times daily.      glucosamine-chondroitin 500-400 mg tablet Take 1 tablet by mouth once daily.      hydrocodone-acetaminophen 5-325mg (NORCO) 5-325 mg per tablet Take 1 tablet by mouth nightly as needed for Pain.      krill-om3-dha-epa-om6-lip-astx (KRILL OIL, OMEGA 3 AND 6,) 1,500-165-67.5 mg Cap Take by mouth once daily.      metformin (GLUCOPHAGE) 1000 MG tablet Take 1,000 mg by mouth 2 (two) times daily with meals.      metoprolol succinate (TOPROL-XL) 25 MG 24 hr tablet Take 25 mg by mouth once daily.      multivitamin (THERAGRAN) per tablet Take 1 tablet by mouth once daily.      nabumetone (RELAFEN) 500 MG tablet Take 500 mg by mouth once daily.      nitroGLYCERIN (NITROSTAT) 0.4 MG SL tablet PLACE ONE TABLET UNDER THE TONGUE EVERY 5 MINUTES as needed for chest pain  1    pravastatin (PRAVACHOL) 20 MG tablet Take 20 mg by mouth once daily.      sitagliptin (JANUVIA) 100 MG Tab Take 100 mg by mouth once daily.      solifenacin (VESICARE) 10 MG tablet Take 10 mg by mouth every evening.       venlafaxine (EFFEXOR-XR) 37.5 MG 24 hr capsule Take 1  "capsule (37.5 mg total) by mouth once daily. 30 capsule 11    vitamin D 1000 units Tab Take 185 mg by mouth once daily.      [DISCONTINUED] BD INSULIN PEN NEEDLE UF MINI 31 x 3/16 " Ndle   11    [DISCONTINUED] methotrexate 10 MG tablet Take 1 tablet (10 mg total) by mouth once a week. 4 tablet 4    [DISCONTINUED] thyroid 30 mg Tab Take 30 mg by mouth once daily.      leflunomide (ARAVA) 10 MG Tab Take 1 tablet (10 mg total) by mouth once daily. 30 tablet 3     No current facility-administered medications on file prior to visit.        Vitals:    07/06/17 0832   BP: 124/80   Pulse: 76       Physical Exam:    Physical Exam   Constitutional: She appears well-developed and well-nourished.   HENT:   Nose: No septal deviation.   Mouth/Throat: Mucous membranes are normal. No oral lesions.   Eyes: Pupils are equal, round, and reactive to light. Right conjunctiva is not injected. Left conjunctiva is not injected.   Neck: No JVD present. No thyroid mass and no thyromegaly present.   Cardiovascular: Normal rate, regular rhythm and normal pulses.    No edema   Pulmonary/Chest: Effort normal and breath sounds normal.   Abdominal: Soft. Normal appearance. There is no hepatosplenomegaly.   Musculoskeletal:        Right shoulder: She exhibits normal range of motion, no tenderness and no swelling.        Left shoulder: She exhibits normal range of motion, no tenderness and no swelling.        Right elbow: She exhibits normal range of motion and no swelling. No tenderness found.        Left elbow: She exhibits normal range of motion and no swelling. No tenderness found.        Right wrist: She exhibits normal range of motion, no tenderness and no swelling.        Left wrist: She exhibits normal range of motion, no tenderness and no swelling.        Right hip: She exhibits normal range of motion, normal strength and no swelling.        Left hip: She exhibits normal range of motion, no tenderness and no swelling.        Right " knee: She exhibits normal range of motion and no swelling. No tenderness found.        Left knee: She exhibits normal range of motion and no swelling. No tenderness found.        Right ankle: She exhibits normal range of motion and no swelling. No tenderness.        Left ankle: She exhibits normal range of motion and no swelling. No tenderness.        Right hand: She exhibits decreased range of motion, tenderness and deformity.        Left hand: She exhibits decreased range of motion, tenderness and deformity.        Left foot: There is tenderness and swelling.   Patient is a DIP bony hypertrophy with deformity particular the second DIP bilaterally.  She has slight erythema at the third fourth and fifth right DIP she's difficulty with finger curl  strength reduced . no active synovitis in the wrist MCPs or PIPs.    Midfoot dorsal extensor tendons slight swelling and tenderness   Lymphadenopathy:     She has no cervical adenopathy.     She has no axillary adenopathy.   Neurological: She has normal strength and normal reflexes.   Skin: Skin is dry and intact.   Psychiatric: She has a normal mood and affect.             Assessment:       Encounter Diagnoses   Name Primary?    Erosive osteoarthritis Yes    Psoriatic arthritis     Inflammatory arthritis     High risk medications (not anticoagulants) long-term use     Type 2 diabetes mellitus with diabetic neuropathy, with long-term current use of insulin           Plan:        Erosive osteoarthritis    Psoriatic arthritis  Comments:  possible  Orders:  -     leflunomide (ARAVA) 10 MG Tab; Take 1 tablet (10 mg total) by mouth once daily.  Dispense: 30 tablet; Refill: 3    Inflammatory arthritis  -     leflunomide (ARAVA) 10 MG Tab; Take 1 tablet (10 mg total) by mouth once daily.  Dispense: 30 tablet; Refill: 3    High risk medications (not anticoagulants) long-term use    Type 2 diabetes mellitus with diabetic neuropathy, with long-term current use of insulin  -      Ambulatory Referral to Endocrinology    Other orders  -     gabapentin (NEURONTIN) 300 MG capsule; Take 1 capsule (300 mg total) by mouth 2 (two) times daily.  Dispense: 60 capsule; Refill: 3      Meloxicam STOP    Nabumetone 500mg twice daily is ok for now be advised this is an NSAID  Continue Leflunomide 10mg daily  Decrease Hydroxychloroquine 200mg daily  Continue Duloxetine (Cymbalta) 60mg daily  Continue Gabapentin     Return in about 3 months (around 10/6/2017).          40min consultation with greater than 50% spent in counseling, chart review and coordination of care. All questions answered. Patient medication list with duplications of NSAIDs and confusion over medications which I have clarified each and every medication today.   Myself.

## 2017-07-06 NOTE — PATIENT INSTRUCTIONS
Meloxicam STOP    Nabumetone 500mg twice daily is ok for now be advised this is an NSAID  Continue Leflunomide 10mg daily  Decrease Hydroxychloroquine 200mg daily  Continue Duloxetine (Cymbalta) 60mg daily  Continue Gabapentin

## 2017-07-13 DIAGNOSIS — M19.90 INFLAMMATORY ARTHRITIS: ICD-10-CM

## 2017-07-13 DIAGNOSIS — L40.50 PSORIATIC ARTHRITIS: ICD-10-CM

## 2017-07-14 NOTE — TELEPHONE ENCOUNTER
----- Message from Beatriz Guevara DO sent at 7/14/2017  1:40 PM CDT -----  Labs ok call patient    Patient notified labs are ok. CG

## 2017-07-19 RX ORDER — LEFLUNOMIDE 10 MG/1
10 TABLET ORAL DAILY
Qty: 30 TABLET | Refills: 3 | Status: SHIPPED | OUTPATIENT
Start: 2017-07-19 | End: 2018-02-02 | Stop reason: SDUPTHER

## 2017-08-29 NOTE — TELEPHONE ENCOUNTER
----- Message from Haydee Noe sent at 8/29/2017  3:17 PM CDT -----  Contact: call //[phone]    Calling    To  Refill the med  Hydroxychloroquine 200 mg  1  Tab   Twice a  Day // please call   [facility] [address]  [address] [facility] [address]  [place] [address]  Phone: [phone] Fax: [phone]

## 2017-08-30 RX ORDER — HYDROXYCHLOROQUINE SULFATE 200 MG/1
200 TABLET, FILM COATED ORAL 2 TIMES DAILY
Qty: 60 TABLET | Refills: 11 | Status: SHIPPED | OUTPATIENT
Start: 2017-08-30 | End: 2018-02-09 | Stop reason: SDUPTHER

## 2017-09-12 ENCOUNTER — OFFICE VISIT (OUTPATIENT)
Dept: ENDOCRINOLOGY | Facility: CLINIC | Age: 67
End: 2017-09-12
Payer: MEDICARE

## 2017-09-12 ENCOUNTER — TELEPHONE (OUTPATIENT)
Dept: ENDOCRINOLOGY | Facility: CLINIC | Age: 67
End: 2017-09-12

## 2017-09-12 VITALS
WEIGHT: 179.5 LBS | SYSTOLIC BLOOD PRESSURE: 118 MMHG | DIASTOLIC BLOOD PRESSURE: 62 MMHG | HEIGHT: 66 IN | BODY MASS INDEX: 28.85 KG/M2 | HEART RATE: 76 BPM

## 2017-09-12 DIAGNOSIS — Z79.4 TYPE 2 DIABETES MELLITUS WITH OTHER NEUROLOGIC COMPLICATION, WITH LONG-TERM CURRENT USE OF INSULIN: ICD-10-CM

## 2017-09-12 DIAGNOSIS — E11.49 TYPE 2 DIABETES MELLITUS WITH OTHER NEUROLOGIC COMPLICATION, WITH LONG-TERM CURRENT USE OF INSULIN: ICD-10-CM

## 2017-09-12 DIAGNOSIS — E03.9 ACQUIRED HYPOTHYROIDISM: ICD-10-CM

## 2017-09-12 DIAGNOSIS — E04.2 MULTINODULAR THYROID: ICD-10-CM

## 2017-09-12 DIAGNOSIS — G63 POLYNEUROPATHY ASSOCIATED WITH UNDERLYING DISEASE: ICD-10-CM

## 2017-09-12 PROCEDURE — 99999 PR PBB SHADOW E&M-EST. PATIENT-LVL III: CPT | Mod: PBBFAC,,, | Performed by: NURSE PRACTITIONER

## 2017-09-12 PROCEDURE — 99213 OFFICE O/P EST LOW 20 MIN: CPT | Mod: PBBFAC,PO | Performed by: NURSE PRACTITIONER

## 2017-09-12 PROCEDURE — 4010F ACE/ARB THERAPY RXD/TAKEN: CPT | Mod: ,,, | Performed by: NURSE PRACTITIONER

## 2017-09-12 PROCEDURE — 99204 OFFICE O/P NEW MOD 45 MIN: CPT | Mod: S$PBB,,, | Performed by: NURSE PRACTITIONER

## 2017-09-12 RX ORDER — FUROSEMIDE 40 MG/1
40 TABLET ORAL
COMMUNITY

## 2017-09-12 RX ORDER — INSULIN ASPART 100 [IU]/ML
INJECTION, SOLUTION INTRAVENOUS; SUBCUTANEOUS
Qty: 2 VIAL | Refills: 12 | Status: SHIPPED | OUTPATIENT
Start: 2017-09-12 | End: 2017-09-12

## 2017-09-12 RX ORDER — INSULIN LISPRO 100 [IU]/ML
INJECTION, SOLUTION INTRAVENOUS; SUBCUTANEOUS
Qty: 2 VIAL | Refills: 12 | Status: SHIPPED | OUTPATIENT
Start: 2017-09-12 | End: 2017-09-21 | Stop reason: SDUPTHER

## 2017-09-12 NOTE — PROGRESS NOTES
Subjective:       Patient ID: Adina Li is a 67 y.o. female.    Chief Complaint: Diabetes Mellitus    HPI -New pt referred by Dr. Guevara-Pt with Type 2 Dm since approx 2004, as well as chronic conditions pending review including HTN, HLP. Hypothyroidism and reported thyroid nodules.  Concerned of poorly controlled DM.  She does brings a glucose log and outside labs.  a1c 8.2,  Chem grossly WNL.     GLucose log reviewed.  Hard to follow.  Primarily checking glucoses BID.   FBS usually 180-200--but a couple of outliers consisting of 100, and > 200.   Appears before lunch or supper--usually lower in 149-150 range.     Pt states compliant with 60 Levemir qhs--but only takes Novolog if glucose if > 150--and then will take 5 units--if over 200--take 10 units, -->250  15 units etc.  No complaint of hypoglycemia     Diabetes Flow Sheet:  Diabetes Medications: levemir 60 qhs, Novolog variable       If insulin vial or pen preference:vial==r/t medicare gap   Prior failed/or not tolerated medication therapies:  Diabetes Complications:peripheral neuropathy,  Aspirin: 81 mg   Statin: pravastatin 20   ACE/ARB:valsartan 80 mg   Last Urine Microalbumin: pending every 6 mo   Last Eye exam:q 6 mo   Last Diabetic Education: pending   Glucometer: unknown     Review of Systems   Constitutional: Positive for fatigue. Negative for activity change.   HENT: Negative for hearing loss and trouble swallowing.    Eyes: Negative for photophobia and visual disturbance.        Last Eye Exam   Respiratory: Positive for shortness of breath. Negative for cough.    Cardiovascular: Negative for chest pain and palpitations.   Gastrointestinal: Positive for constipation and diarrhea.   Endocrine: Positive for cold intolerance.   Genitourinary: Negative for frequency and urgency.   Musculoskeletal: Positive for arthralgias and myalgias.   Skin: Negative for rash and wound.   Neurological: Positive for numbness (hands, and more so, feet at night).  "Negative for weakness.   Psychiatric/Behavioral: Positive for agitation, decreased concentration and sleep disturbance. The patient is not nervous/anxious.         Blames on climacteric state--off HRT       Objective:      Physical Exam   Constitutional: She is oriented to person, place, and time. She appears well-developed and well-nourished.   Vital Signs  Pulse: 76  BP: 118/62  BP Location: Right arm  Patient Position: Sitting  Pain Score:   2 (arthritis pain)  Height and Weight  Height: 5' 5.5" (166.4 cm)  Weight: 81.4 kg (179 lb 8 oz)  BSA (Calculated - sq m): 1.94 sq meters    Appears age, appropriately groomed, NAD  BMI (Calculated): 29.5  Weight in (lb) to have BMI = 25: 152.2]     HENT:   Head: Normocephalic and atraumatic.   Nose: Nose normal.   Mouth/Throat: Oropharynx is clear and moist.   Eyes: Conjunctivae and EOM are normal. Pupils are equal, round, and reactive to light.   Neck: Normal range of motion. Neck supple. No tracheal deviation present.   Cardiovascular: Normal rate, regular rhythm, normal heart sounds and intact distal pulses.    Pulmonary/Chest: Effort normal and breath sounds normal.   Musculoskeletal: Normal range of motion.   Feet:no open wounds or calluses.  Good pedal care   Pedal pulses + 2 bilaterally   Vibratory sensation to markedly decreased bilaterally    Lymphadenopathy:     She has no cervical adenopathy.   Neurological: She is alert and oriented to person, place, and time. A sensory deficit is present.   Skin: Skin is warm and dry.   Psychiatric: She has a normal mood and affect. Her behavior is normal. Judgment and thought content normal.     No results found for: HGBA1C    Assessment:       1. Type 2 diabetes mellitus with other neurologic complication, with long-term current use of insulin  Lipid panel  Chronic-stable-cont pravastatin     Hemoglobin A1c    Microalbumin/creatinine urine ratio     DIABETES FOOT EXAM    Ambulatory Referral to Diabetes Education   2. " Acquired hypothyroidism  TSH    US Soft Tissue Head Neck Thyroid  Chronic-stable-monitor    3. Multinodular thyroid  -chronic-stable-monitor    4. Polyneuropathy associated with underlying disease  -chronic-stable-mointor foot care          Plan:       Lantus 60 qhs  Novolog 10 plus ss with meals  Cons Diabetes ed--needs basic review on proper insulin storage, site, hypolgycemia, review logs,  Diet.   D/c januvia     ORDERS  9/12/17  Cons diabetes ed--next couple of weeks.     Dann with fasting lipids, a1c, tsh, urine m/c thyroid US prior     60 min >50% counseling new insulin regimen and how insulin works.

## 2017-09-12 NOTE — LETTER
September 13, 2017      Beatriz Guevara, DO  [address] [facility] [address] [place] [address]           [facility] Endocrin[facility]  [address] [facility] [address] [place] [address]  Phone: [phone]  Fax: [phone]          Patient: Adina Li   MR Number: [id number]   YOB: 1950   Date of Visit: 9/12/2017       Dear Dr. Beatriz Guevara:    Thank you for referring Adina Li to me for evaluation. Attached you will find relevant portions of my assessment and plan of care.    If you have questions, please do not hesitate to call me. I look forward to following Adina Li along with you.    Sincerely,    ALLAN Batista,ANP-C    Enclosure  CC:  No Recipients    If you would like to receive this communication electronically, please contact [email or web address] or [phone] to request more information on [facility] access.    For providers and/or their staff who would like to refer a patient to [facility], please contact us through our one-stop-shop provider referral line, [facility], at [phone].    If you feel you have received this communication in error or would no longer like to receive these types of communications, please e-mail [email or web address]

## 2017-09-21 RX ORDER — INSULIN LISPRO 100 [IU]/ML
INJECTION, SOLUTION INTRAVENOUS; SUBCUTANEOUS
Qty: 2 VIAL | Refills: 12 | Status: SHIPPED | OUTPATIENT
Start: 2017-09-21 | End: 2017-11-28 | Stop reason: SDUPTHER

## 2017-09-26 ENCOUNTER — TELEPHONE (OUTPATIENT)
Dept: ENDOCRINOLOGY | Facility: CLINIC | Age: 67
End: 2017-09-26

## 2017-09-26 ENCOUNTER — CLINICAL SUPPORT (OUTPATIENT)
Dept: DIABETES | Facility: CLINIC | Age: 67
End: 2017-09-26
Payer: MEDICARE

## 2017-09-26 DIAGNOSIS — E11.49 TYPE 2 DIABETES MELLITUS WITH OTHER NEUROLOGIC COMPLICATION, WITH LONG-TERM CURRENT USE OF INSULIN: ICD-10-CM

## 2017-09-26 DIAGNOSIS — Z79.4 TYPE 2 DIABETES MELLITUS WITH OTHER NEUROLOGIC COMPLICATION, WITH LONG-TERM CURRENT USE OF INSULIN: ICD-10-CM

## 2017-09-26 PROCEDURE — G0108 DIAB MANAGE TRN  PER INDIV: HCPCS | Mod: PBBFAC,PO | Performed by: DIETITIAN, REGISTERED

## 2017-09-26 NOTE — PROGRESS NOTES
"Diabetes Education  Author: Mickie Silverio RD, CDE  Date: 9/26/2017    Diabetes Education Visit  Diabetes Education Record Assessment/Progress: Initial    Diabetes Type  Diabetes Type : Type II    Diabetes History  Diabetes Diagnosis: >10 years (Diagnosed in 2004.  Was with Dr. Sherwood previously and had education about 10 yrs prior)    Nutrition  Meal Planning:  (Patient reports that she generally skips breakfast due to not being hungry.  She drinks 2 cups of coffee with no sugar/creamer.  Eating sandwich or salad for lunch and then larger meal at supper/ all diet drinks)    Monitoring   Monitoring:  (Has [employer] meter.  Checking 3-4 times daily.  Sometimes missing dinner or bedtime reading)  Self Monitoring :  (Yes. Logs reviewed by Sloane today and insulin changes made)  Blood Glucose Logs: Yes    Exercise   Exercise Type:  (no programmed activity)    Current Diabetes Treatment   Current Treatment: Oral Medication, Insulin (Lantus 60, Novolog 10-10-10 plus sliding scale.  Her doses were changed to 70 units of Lantus and 15-15-15 today)    Social History  Preferred Learning Method: Face to Face    Barriers to Change  Barriers to Change: None  Learning Challenges : None    Readiness to Learn   Readiness to Learn : Acceptance    Cultural Influences  Cultural Influences: No    Diabetes Education Assessment/Progress  Acute Complications (preventing, detecting, and treating acute complications): Discussion (Reviewed symptoms, treatment and prevention of hypoglycemia.  One episode of 90 where she felt "terrible" )  Diabetes Disease Process (diabetes disease process and treatment options): Discussion (Went over goal blood sugars and A1c value)  Nutrition (Incorporating nutritional management into one's lifestyle): Discussion (Reviewed carb sources and appropriate amounts to consume per meal and snack.  Reviewed timing of meals and importance of eating 3 meals daily.  Label reading reviewed)  Physical Activity " (incorporating physical activity into one's lifestyle): Discussion  Medications (states correct name, dose, onset, peak, duration, side effects & timing of meds): Discussion, Demonstration, Competent (verbalizes/demonstrates) (Patient was taking an additional dose of 10 units at bedtime plus a sliding scale.  Explained that she should only be taking her base dose of meal time insulin at meals and can use the sliding scale at bedtime if necessary.  Reviewed site rotation and insulin storage as well)  Monitoring (monitoring blood glucose/other parameters & using results): Discussion (Encouraged her to continue to test 3-4 times daily for now until doses of insulin are more accurate.  She is keeping good logs and will send in another one in 2-4 weeks for review and possible adjustments)    Diabetes Care Plan/Intervention  Education Plan/Intervention:  (New insulin instructions given and patient will continue to monitor and log.  F/u appointment made for January when Sloane returns from leave.  Can send log in interim for adjustments.  Phone # provided.  )    Diabetes Meal Plan  Calories: 1500  Carbohydrate Per Meal: 30-45g  Carbohydrate Per Snack : 15-20g    Education Units of Time   Time Spent: 60 min      Health Maintenance Topics with due status: Not Due       Topic Last Completion Date    Foot Exam 09/12/2017     Health Maintenance Due   Topic Date Due    Eye Exam  07/28/1962    TETANUS VACCINE  07/28/1970    DEXA SCAN  07/28/1992    Colonoscopy  07/28/2002    Zoster Vaccine  07/28/2012    Hemoglobin A1c  07/26/2015    Lipid Panel  04/30/2016    Mammogram  04/27/2017    Pneumococcal (65+) (1 of 2 - PCV13) 07/28/2017    Influenza Vaccine  08/01/2017

## 2017-09-29 ENCOUNTER — LAB VISIT (OUTPATIENT)
Dept: LAB | Facility: HOSPITAL | Age: 67
End: 2017-09-29
Attending: INTERNAL MEDICINE
Payer: MEDICARE

## 2017-09-29 DIAGNOSIS — Z79.899 HIGH RISK MEDICATIONS (NOT ANTICOAGULANTS) LONG-TERM USE: ICD-10-CM

## 2017-09-29 DIAGNOSIS — M19.90 INFLAMMATORY ARTHRITIS: ICD-10-CM

## 2017-09-29 LAB
ALBUMIN SERPL BCP-MCNC: 3.3 G/DL
ALP SERPL-CCNC: 75 U/L
ALT SERPL W/O P-5'-P-CCNC: 33 U/L
ANION GAP SERPL CALC-SCNC: 12 MMOL/L
AST SERPL-CCNC: 27 U/L
BASOPHILS # BLD AUTO: 0.06 K/UL
BASOPHILS NFR BLD: 1 %
BILIRUB SERPL-MCNC: 0.5 MG/DL
BUN SERPL-MCNC: 19 MG/DL
CALCIUM SERPL-MCNC: 10.1 MG/DL
CHLORIDE SERPL-SCNC: 99 MMOL/L
CO2 SERPL-SCNC: 28 MMOL/L
CREAT SERPL-MCNC: 0.9 MG/DL
CRP SERPL-MCNC: 1.8 MG/L
DIFFERENTIAL METHOD: NORMAL
EOSINOPHIL # BLD AUTO: 0.1 K/UL
EOSINOPHIL NFR BLD: 2.4 %
ERYTHROCYTE [DISTWIDTH] IN BLOOD BY AUTOMATED COUNT: 13 %
ERYTHROCYTE [SEDIMENTATION RATE] IN BLOOD BY WESTERGREN METHOD: 20 MM/HR
EST. GFR  (AFRICAN AMERICAN): >60 ML/MIN/1.73 M^2
EST. GFR  (NON AFRICAN AMERICAN): >60 ML/MIN/1.73 M^2
GLUCOSE SERPL-MCNC: 175 MG/DL
HCT VFR BLD AUTO: 40.2 %
HGB BLD-MCNC: 13.8 G/DL
LYMPHOCYTES # BLD AUTO: 2 K/UL
LYMPHOCYTES NFR BLD: 34.4 %
MCH RBC QN AUTO: 29.6 PG
MCHC RBC AUTO-ENTMCNC: 34.3 G/DL
MCV RBC AUTO: 86 FL
MONOCYTES # BLD AUTO: 0.7 K/UL
MONOCYTES NFR BLD: 12.4 %
NEUTROPHILS # BLD AUTO: 2.8 K/UL
NEUTROPHILS NFR BLD: 49.6 %
PLATELET # BLD AUTO: 292 K/UL
PMV BLD AUTO: 11.1 FL
POTASSIUM SERPL-SCNC: 4.5 MMOL/L
PROT SERPL-MCNC: 7.4 G/DL
RBC # BLD AUTO: 4.66 M/UL
SODIUM SERPL-SCNC: 139 MMOL/L
WBC # BLD AUTO: 5.72 K/UL

## 2017-09-29 PROCEDURE — 80053 COMPREHEN METABOLIC PANEL: CPT

## 2017-09-29 PROCEDURE — 36415 COLL VENOUS BLD VENIPUNCTURE: CPT | Mod: PO

## 2017-09-29 PROCEDURE — 85651 RBC SED RATE NONAUTOMATED: CPT | Mod: PO

## 2017-09-29 PROCEDURE — 86140 C-REACTIVE PROTEIN: CPT

## 2017-09-29 PROCEDURE — 85025 COMPLETE CBC W/AUTO DIFF WBC: CPT

## 2017-10-03 ENCOUNTER — TELEPHONE (OUTPATIENT)
Dept: ENDOCRINOLOGY | Facility: CLINIC | Age: 67
End: 2017-10-03

## 2017-10-03 NOTE — TELEPHONE ENCOUNTER
----- Message from Arlene Dangelo sent at 10/3/2017  9:17 AM CDT -----  Contact: self [phone]  You ordered insulin for her that she was not aware of, her insurance does not cover it and it costs $200 for that one script.  The one they were paying for is levemir.  But now she is in the donut hole.  Please call her about this.  Thank you!

## 2017-10-25 ENCOUNTER — TELEPHONE (OUTPATIENT)
Dept: PHARMACY | Facility: CLINIC | Age: 67
End: 2017-10-25

## 2017-10-31 ENCOUNTER — OFFICE VISIT (OUTPATIENT)
Dept: RHEUMATOLOGY | Facility: CLINIC | Age: 67
End: 2017-10-31
Payer: MEDICARE

## 2017-10-31 VITALS
SYSTOLIC BLOOD PRESSURE: 120 MMHG | HEIGHT: 66 IN | BODY MASS INDEX: 29.58 KG/M2 | DIASTOLIC BLOOD PRESSURE: 70 MMHG | HEART RATE: 80 BPM | WEIGHT: 184.06 LBS

## 2017-10-31 DIAGNOSIS — Z79.899 HIGH RISK MEDICATIONS (NOT ANTICOAGULANTS) LONG-TERM USE: ICD-10-CM

## 2017-10-31 DIAGNOSIS — M79.7 FIBROMYALGIA: ICD-10-CM

## 2017-10-31 DIAGNOSIS — L40.50 PSORIATIC ARTHRITIS: Primary | ICD-10-CM

## 2017-10-31 PROCEDURE — 99213 OFFICE O/P EST LOW 20 MIN: CPT | Mod: PBBFAC,PO | Performed by: INTERNAL MEDICINE

## 2017-10-31 PROCEDURE — 99999 PR PBB SHADOW E&M-EST. PATIENT-LVL III: CPT | Mod: PBBFAC,,, | Performed by: INTERNAL MEDICINE

## 2017-10-31 PROCEDURE — 99214 OFFICE O/P EST MOD 30 MIN: CPT | Mod: S$PBB,,, | Performed by: INTERNAL MEDICINE

## 2017-10-31 NOTE — PROGRESS NOTES
Subjective:          Chief Complaint: Adina iL is a 67 y.o. female who had concerns including Disease Management.    HPI:    Patient is a 66-year-old female here for f/u of PsA vs EOA (+hx of psoriasis but no active plaques) and fibromyalgia.  Serologies: CCP negative,  rheumatoid factor negative,   Imaging with erosive changes at the capitate and DIP.   Labs 17 no toxicity  Doing well with Arava with nearly 100% difference in joint stiffness and pain.  No ASE.   Hit with softball right arm. Not one bruise. But swelling. FROM  Overall pain 0/10!!!  Rheumatoid HCQ 200mg BID,   Arava 10mg daily.     No significant benefit with MTX   Nabumetone 500mg BID PRN>      She previously started on methotrexate of 5 tabs weekly as well as Savella.She was on MTX years ago with little note of improvement.   Is a past medical history for type 2 diabetes mellitus, hypertension, IBS, previous myocardial infarction and diverticulitis. She has DIP pain, swelling and deformity. Some PIP, little MCP and some wrist tenderness. She has long hx of arthritis with bilateral TKA. She has hx of Lumbar DDD/DJD and cervical DDD with [facility]-Dr. Sim. Patient notes this started as a young woman 20s and 30s. She was rather active. She has some pain stiffness in shoulders. Currently right ring finger burns and stiff. Described as ache. he is taking Advil PM helps with pain and for sleep, chondroitin sulfate or move free. Recently started [place] water and feeling much better. She is s/p CAD with stenting x 2 and sister with MI  at 43y/o. She is cautious with NSAIDs. Patient with some dry mouth, no dry eyes, no serositis, pleurisy, ILD. ? Psoriasis scalp.  [place] with cardiac work up no occlusive CAD.2017.        REVIEW OF SYSTEMS:    Review of Systems   Constitutional: Negative for fever, malaise/fatigue and weight loss.   HENT: Negative for sore throat.    Eyes: Negative for double vision, photophobia and redness.   Respiratory:  Negative for cough, shortness of breath and wheezing.    Cardiovascular: Negative for chest pain, palpitations and orthopnea.   Gastrointestinal: Negative for abdominal pain, constipation and diarrhea.   Genitourinary: Negative for dysuria, hematuria and urgency.   Musculoskeletal: Positive for back pain and joint pain. Negative for myalgias.   Skin: Negative for rash.   Neurological: Negative for dizziness, tingling, focal weakness and headaches.   Endo/Heme/Allergies: Does not bruise/bleed easily.   Psychiatric/Behavioral: Negative for depression, hallucinations and suicidal ideas.               Objective:            Past Medical History:   Diagnosis Date    Acid reflux     Anxiety     Arthritis     Diabetes mellitus     Fibromyalgia     Hypertension     IBS (irritable bowel syndrome)     Myocardial infarction     Ulcerative colitis      Family History   Problem Relation Age of Onset    Colon cancer Mother     Heart attack Father     Heart attack Sister     Diabetes Sister     Breast cancer Sister      Social History   Substance Use Topics    Smoking status: Former Smoker     Quit date: 5/8/2005    Smokeless tobacco: Never Used    Alcohol use No         Current Outpatient Prescriptions on File Prior to Visit   Medication Sig Dispense Refill    ACCU-CHEK DAMIR PLUS TEST STRP Strp TEST BLOOD SUGAR twice daily AND AS NEEDED  3    alprazolam (XANAX) 0.5 MG tablet Take 0.5 mg by mouth nightly as needed for Insomnia or Anxiety.      aspirin 81 MG Chew Take 81 mg by mouth once daily.      b complex vitamins capsule Take 1 capsule by mouth once daily.      cinnamon bark (CINNAMON) 500 mg capsule Take 500 mg by mouth once daily.      co-enzyme Q-10 30 mg capsule Take 100 mg by mouth once daily.      cranberry 500 mg Cap Take 500 mg by mouth once daily.      CYANOCOBALAMIN, VITAMIN B-12, ORAL Take 1,000 mg by mouth once daily.      diphenoxylate-atropine 2.5-0.025 mg (LOMOTIL) 2.5-0.025 mg per  tablet   1    duloxetine (CYMBALTA) 60 MG capsule Take 60 mg by mouth once daily.      ferrous sulfate 325 mg (65 mg iron) Tab tablet Take 325 mg by mouth once daily.      folic acid (FOLVITE) 1 MG tablet Take 1 tablet (1 mg total) by mouth once daily. 30 tablet 5    furosemide (LASIX) 40 MG tablet Take 40 mg by mouth as needed.      gabapentin (NEURONTIN) 300 MG capsule Take 1 capsule (300 mg total) by mouth 2 (two) times daily. 60 capsule 3    glucosamine-chondroitin 500-400 mg tablet Take 1 tablet by mouth once daily.      hydrocodone-acetaminophen 5-325mg (NORCO) 5-325 mg per tablet Take 1 tablet by mouth nightly as needed for Pain.      hydroxychloroquine (PLAQUENIL) 200 mg tablet Take 1 tablet (200 mg total) by mouth 2 (two) times daily. 60 tablet 11    insulin glargine (LANTUS) 100 unit/mL injection Inject 70 Units into the skin once daily.       insulin lispro (HUMALOG) 100 unit/mL injection 10 units with meals  Plus ss====60 units a day max. (Patient taking differently: 15 units with meals  Plus ss====60 units a day max.) 2 vial 12    krill-om3-dha-epa-om6-lip-astx (KRILL OIL, OMEGA 3 AND 6,) 1,500-165-67.5 mg Cap Take by mouth once daily.      levothyroxine (SYNTHROID) 100 MCG tablet Take 100 mcg by mouth before breakfast.      metformin (GLUCOPHAGE) 1000 MG tablet Take 1,000 mg by mouth 2 (two) times daily with meals.      metoprolol succinate (TOPROL-XL) 25 MG 24 hr tablet Take 25 mg by mouth once daily.      multivitamin (THERAGRAN) per tablet Take 1 tablet by mouth once daily.      nabumetone (RELAFEN) 500 MG tablet Take 500 mg by mouth once daily.      nitroGLYCERIN (NITROSTAT) 0.4 MG SL tablet PLACE ONE TABLET UNDER THE TONGUE EVERY 5 MINUTES as needed for chest pain  1    pravastatin (PRAVACHOL) 20 MG tablet Take 20 mg by mouth once daily.      solifenacin (VESICARE) 10 MG tablet Take 10 mg by mouth every evening.       valsartan (DIOVAN) 80 MG tablet Take 80 mg by mouth once  daily.      vitamin D 1000 units Tab Take 185 mg by mouth once daily.      leflunomide (ARAVA) 10 MG Tab Take 1 tablet (10 mg total) by mouth once daily. 30 tablet 3     No current facility-administered medications on file prior to visit.        Vitals:    10/31/17 1027   BP: 120/70   Pulse: 80       Physical Exam:    Physical Exam   Constitutional: She appears well-developed and well-nourished.   HENT:   Nose: No septal deviation.   Mouth/Throat: Mucous membranes are normal. No oral lesions.   Eyes: Pupils are equal, round, and reactive to light. Right conjunctiva is not injected. Left conjunctiva is not injected.   Neck: No JVD present. No thyroid mass and no thyromegaly present.   Cardiovascular: Normal rate, regular rhythm and normal pulses.    No edema   Pulmonary/Chest: Effort normal and breath sounds normal.   Abdominal: Soft. Normal appearance. There is no hepatosplenomegaly.   Musculoskeletal:        Right shoulder: She exhibits normal range of motion, no tenderness and no swelling.        Left shoulder: She exhibits normal range of motion, no tenderness and no swelling.        Right elbow: She exhibits normal range of motion and no swelling. No tenderness found.        Left elbow: She exhibits normal range of motion and no swelling. No tenderness found.        Right wrist: She exhibits normal range of motion, no tenderness and no swelling.        Left wrist: She exhibits normal range of motion, no tenderness and no swelling.        Right hip: She exhibits normal range of motion, normal strength and no swelling.        Left hip: She exhibits normal range of motion, no tenderness and no swelling.        Right knee: She exhibits normal range of motion and no swelling. No tenderness found.        Left knee: She exhibits normal range of motion and no swelling. No tenderness found.        Right ankle: She exhibits normal range of motion and no swelling. No tenderness.        Left ankle: She exhibits normal  range of motion and no swelling. No tenderness.        Right hand: She exhibits decreased range of motion, tenderness and deformity.        Left hand: She exhibits decreased range of motion, tenderness and deformity.        Left foot: There is tenderness and swelling.   Patient is a DIP bony hypertrophy with deformity particular the second DIP bilaterally.  She has slight erythema at the third fourth and fifth right DIP she's difficulty with finger curl  strength reduced . no active synovitis in the wrist MCPs or PIPs.    Midfoot dorsal extensor tendons slight swelling and tenderness   Lymphadenopathy:     She has no cervical adenopathy.     She has no axillary adenopathy.   Neurological: She has normal strength and normal reflexes.   Skin: Skin is dry and intact.   Psychiatric: She has a normal mood and affect.             Assessment:       Encounter Diagnoses   Name Primary?    Psoriatic arthritis Yes    Fibromyalgia     High risk medications (not anticoagulants) long-term use           Plan:        Psoriatic arthritis  -     CBC auto differential; Standing; Expected date: 10/31/2017  -     Comprehensive metabolic panel; Standing; Expected date: 10/31/2017  -     Sedimentation rate, manual; Standing; Expected date: 10/31/2017  -     C-reactive protein; Standing; Expected date: 10/31/2017    Fibromyalgia    High risk medications (not anticoagulants) long-term use          Nabumetone 500mg twice daily is ok for now be advised this is an NSAID  Continue Leflunomide 10mg daily-doing great with this.   Decrease Hydroxychloroquine 200mg daily  Continue Duloxetine (Cymbalta) 60mg daily  Continue Gabapentin     Return in about 4 months (around 2/28/2018).          30min consultation with greater than 50% spent in counseling, chart review and coordination of care. All questions answered. Patient medication list with duplications of NSAIDs and confusion over medications which I have clarified each and every  medication today.

## 2017-11-01 RX ORDER — SYRINGE,SAFETY WITH NEEDLE,1ML 25GX1"
1 SYRINGE (EA) MISCELLANEOUS 4 TIMES DAILY
Qty: 120 EACH | Refills: 6 | Status: SHIPPED | OUTPATIENT
Start: 2017-11-01 | End: 2018-02-21 | Stop reason: SDUPTHER

## 2017-11-01 RX ORDER — BLOOD SUGAR DIAGNOSTIC
1 STRIP MISCELLANEOUS 4 TIMES DAILY
Qty: 120 EACH | Refills: 6 | Status: SHIPPED | OUTPATIENT
Start: 2017-11-01 | End: 2017-11-28 | Stop reason: CLARIF

## 2017-11-02 ENCOUNTER — TELEPHONE (OUTPATIENT)
Dept: PHARMACY | Facility: CLINIC | Age: 67
End: 2017-11-02

## 2017-11-28 RX ORDER — INSULIN LISPRO 100 [IU]/ML
INJECTION, SOLUTION INTRAVENOUS; SUBCUTANEOUS
Qty: 2 VIAL | Refills: 11 | Status: SHIPPED | OUTPATIENT
Start: 2017-11-28 | End: 2018-02-21

## 2017-11-28 NOTE — TELEPHONE ENCOUNTER
----- Message from Adina PAGE Jose Luisstephanie sent at 11/28/2017  1:33 PM CST -----  Contact: Patient   Adina, patient [phone], Needs a refill on Rx Contour Next easy test strips by [employer], testing 4 times daily and Rx insulin lispro (HUMALOG) 100 unit/mL injection and Rx Levemir replacement for (insulin glargine (LANTUS) 100 unit/mL injection) . Please advise. Thanks.   [facility] [address] [facility]  [place] [address]  Phone: [phone] Fax: [phone]

## 2017-11-30 ENCOUNTER — TELEPHONE (OUTPATIENT)
Dept: ENDOCRINOLOGY | Facility: CLINIC | Age: 67
End: 2017-11-30

## 2017-11-30 NOTE — TELEPHONE ENCOUNTER
----- Message from Stephanie Chacon sent at 11/30/2017  1:53 PM CST -----  blood sugar diagnostic Strp refill    Patient states she is at the pharmacy now, [phone] (home)       [facility] - [place], [place] - [address]  [address]  [place] [address]  Phone: [phone] Fax: [phone]

## 2017-11-30 NOTE — TELEPHONE ENCOUNTER
----- Message from Beto Lux sent at 11/30/2017  3:21 PM CST -----  Contact: self   Placed call to pod, patient miss call from your office please call back at [phone] (home)

## 2017-11-30 NOTE — TELEPHONE ENCOUNTER
Spoke with [facility], states they need the medicare form filled out, attempted to reach pt to get brand of meter, left message to call the office back with brand of meter

## 2017-12-04 ENCOUNTER — TELEPHONE (OUTPATIENT)
Dept: ENDOCRINOLOGY | Facility: CLINIC | Age: 67
End: 2017-12-04

## 2017-12-04 NOTE — TELEPHONE ENCOUNTER
Lm instructing pt medicare form was filled out according to pharmacy request, will call and get a follow up

## 2017-12-04 NOTE — TELEPHONE ENCOUNTER
----- Message from Cynthia Alvarado sent at 12/4/2017 10:12 AM CST -----  Contact: Adina   Calling about her test strips. States it is not coded to where Medicare will cover it .please call back [phone] (home)   Thanks!

## 2018-01-08 ENCOUNTER — HOSPITAL ENCOUNTER (OUTPATIENT)
Dept: RADIOLOGY | Facility: HOSPITAL | Age: 68
Discharge: HOME | End: 2018-01-08
Attending: NURSE PRACTITIONER
Payer: MEDICARE

## 2018-01-08 DIAGNOSIS — E03.9 ACQUIRED HYPOTHYROIDISM: ICD-10-CM

## 2018-01-08 PROCEDURE — 76536 US EXAM OF HEAD AND NECK: CPT | Mod: TC,PO

## 2018-01-08 PROCEDURE — 76536 US EXAM OF HEAD AND NECK: CPT | Mod: 26,,, | Performed by: RADIOLOGY

## 2018-01-19 ENCOUNTER — OFFICE VISIT (OUTPATIENT)
Dept: ENDOCRINOLOGY | Facility: CLINIC | Age: 68
End: 2018-01-19
Payer: MEDICARE

## 2018-01-19 VITALS
WEIGHT: 185.94 LBS | SYSTOLIC BLOOD PRESSURE: 130 MMHG | HEIGHT: 65 IN | BODY MASS INDEX: 30.98 KG/M2 | DIASTOLIC BLOOD PRESSURE: 72 MMHG | HEART RATE: 75 BPM

## 2018-01-19 DIAGNOSIS — E03.9 ACQUIRED HYPOTHYROIDISM: ICD-10-CM

## 2018-01-19 DIAGNOSIS — E04.2 MULTINODULAR THYROID: ICD-10-CM

## 2018-01-19 DIAGNOSIS — E11.49 TYPE 2 DIABETES MELLITUS WITH OTHER NEUROLOGIC COMPLICATION, WITH LONG-TERM CURRENT USE OF INSULIN: Primary | ICD-10-CM

## 2018-01-19 DIAGNOSIS — Z79.4 TYPE 2 DIABETES MELLITUS WITH OTHER NEUROLOGIC COMPLICATION, WITH LONG-TERM CURRENT USE OF INSULIN: Primary | ICD-10-CM

## 2018-01-19 DIAGNOSIS — E05.90 HYPERTHYROIDISM: ICD-10-CM

## 2018-01-19 DIAGNOSIS — G63 POLYNEUROPATHY ASSOCIATED WITH UNDERLYING DISEASE: ICD-10-CM

## 2018-01-19 PROCEDURE — 99999 PR PBB SHADOW E&M-EST. PATIENT-LVL III: CPT | Mod: PBBFAC,,, | Performed by: NURSE PRACTITIONER

## 2018-01-19 PROCEDURE — 99215 OFFICE O/P EST HI 40 MIN: CPT | Mod: S$PBB,,, | Performed by: NURSE PRACTITIONER

## 2018-01-19 PROCEDURE — 99213 OFFICE O/P EST LOW 20 MIN: CPT | Mod: PBBFAC,PO | Performed by: NURSE PRACTITIONER

## 2018-01-19 NOTE — PROGRESS NOTES
Subjective:       Patient ID: Adina Li is a 67 y.o. female.    Chief Complaint: Diabetes    HPI Pt is a 67 y.o. WF with a diagnosis of Type 2 diabetes mellitus diagnosed approximately 2004, as well as chronic conditions pending review including HTN, HLP, hypothyroidism and reported thyroid nodules. .  Other pertinent medical and social information noted includes, but not limited to: propensity for depression and anxiety.     Interim Events: At our last and initial visit the action of insulin was explained.  Pt on an usual regimen.  Pt was also sent to [place].  Labs and thyroid US were done prior to this visit.              Diabetes Flow Sheet:  Diabetes Medications: levemir 60 qhs, Novolog variable       If insulin vial or pen preference:vial==r/t medicare gap   Prior failed/or not tolerated medication therapies:  Diabetes Complications:peripheral neuropathy,  Aspirin: 81 mg   Statin: pravastatin 20   ACE/ARB:valsartan 80 mg   Last Urine Microalbumin: pending every 6 mo   Last Eye exam:q 6 mo   Last Diabetic Education: pending   Glucometer: unknown        Review of Systems   Constitutional: Positive for fatigue. Negative for activity change.   HENT: Negative for hearing loss and trouble swallowing.    Eyes: Negative for photophobia and visual disturbance.        Last Eye Exam   Respiratory: Positive for shortness of breath (2 weels ago-resolved ). Negative for cough.    Cardiovascular: Positive for chest pain (2 weeks ago, brief, resolved). Negative for palpitations.   Gastrointestinal: Positive for diarrhea (chronic but  worsened recently ). Negative for constipation.   Endocrine: Positive for heat intolerance (worsened recently ).   Genitourinary: Negative for frequency and urgency.   Musculoskeletal: Negative for arthralgias and myalgias.   Skin: Negative for rash and wound.   Allergic/Immunologic: Negative for food allergies.   Neurological: Positive for numbness (hands). Negative for weakness.    Psychiatric/Behavioral: Positive for sleep disturbance. The patient is not nervous/anxious.         Worsened anxiety and depression.        Objective:      Physical Exam   Constitutional: She is oriented to person, place, and time. She appears well-developed and well-nourished.   HENT:   Head: Normocephalic and atraumatic.   Nose: Nose normal.   Mouth/Throat: Oropharynx is clear and moist.   Eyes: Conjunctivae and EOM are normal. Pupils are equal, round, and reactive to light.   Neck: Normal range of motion. Neck supple. No tracheal deviation present. No thyromegaly present.   Cardiovascular: Normal rate, regular rhythm and normal heart sounds.    Pulmonary/Chest: Effort normal and breath sounds normal.   Abdominal: Bowel sounds are normal.   Hyperactive BS   Musculoskeletal: Normal range of motion. She exhibits no deformity.   Feet:    Neurological: She is alert and oriented to person, place, and time.   Faint hand tremors bilaterally noted.   Skin: Skin is warm and dry.   Psychiatric: She has a normal mood and affect. Her behavior is normal. Judgment and thought content normal.   Vitals reviewed.      Hemoglobin A1C   Date Value Ref Range Status   01/08/2018 8.2 (H) 4.0 - 5.6 % Final     Comment:     According to ADA guidelines, hemoglobin A1c <7.0% represents  optimal control in non-pregnant diabetic patients. Different  metrics may apply to specific patient populations.   Standards of Medical Care in Diabetes-2016.  For the purpose of screening for the presence of diabetes:  <5.7%     Consistent with the absence of diabetes  5.7-6.4%  Consistent with increasing risk for diabetes   (prediabetes)  >or=6.5%  Consistent with diabetes  Currently, no consensus exists for use of hemoglobin A1c  for diagnosis of diabetes for children.  This Hemoglobin A1c assay has significant interference with fetal   hemoglobin   (HbF). The results are invalid for patients with abnormal amounts of   HbF,   including those with known  Hereditary Persistence   of Fetal Hemoglobin. Heterozygous hemoglobin variants (HbAS, HbAC,   HbAD, HbAE, HbA2) do not significantly interfere with this assay;   however, presence of multiple variants in a sample may impact the %   interference.         Chemistry        Component Value Date/Time     09/29/2017 1055    K 4.5 09/29/2017 1055    CL 99 09/29/2017 1055    CO2 28 09/29/2017 1055    BUN 19 09/29/2017 1055    CREATININE 0.9 09/29/2017 1055     (H) 09/29/2017 1055        Component Value Date/Time    CALCIUM 10.1 09/29/2017 1055    ALKPHOS 75 09/29/2017 1055    AST 27 09/29/2017 1055    ALT 33 09/29/2017 1055    BILITOT 0.5 09/29/2017 1055    ESTGFRAFRICA >60.0 09/29/2017 1055    EGFRNONAA >60.0 09/29/2017 1055        Lab Results   Component Value Date    LDLCALC 90.8 01/08/2018     Lab Results   Component Value Date    TSH 0.056 (L) 01/08/2018     Component      Latest Ref Rng & Units 1/8/2018   Microalbum.,U,Random      ug/mL 17.0   Creatinine, Random Ur      15.0 - 325.0 mg/dL 148.0   Microalb Creat Ratio      0.0 - 30.0 ug/mg 11.5       Assessment:       1. Type 2 diabetes mellitus with other neurologic complication, with long-term current use of insulin  Hemoglobin A1c    Chronic-uncontrolled-see plan    2. Multinodular thyroid  NM Thyroid Uptake and Scan  New dx-needs FNA--will arrange, Dr. Segal aware    3. Acquired hypothyroidism  TSH--resolved, now suppressed    4. Polyneuropathy associated with underlying disease     5. Hyperthyroidism  -new dx-high risk-uptake and scan.          Plan:       * Increase lantus to 75 qhs.   Increase Novolog to 14 plus ss with meals  Officially stop levothyroxine 100 mcg--since pt has not taken since July and now with suppressed TSH.   Stop metformin-not helping the diarrhea.     ORDERS 01/19/2018   Thyroid UPtake and scan--hyperthyroidsims.   FNA with Dr Segal after uptake and scan reviewed.     4 mo with me with a1c, TSH prior     Complex, reviewed with  Dr. Segal.

## 2018-01-22 ENCOUNTER — TELEPHONE (OUTPATIENT)
Dept: ENDOCRINOLOGY | Facility: CLINIC | Age: 68
End: 2018-01-22

## 2018-01-22 NOTE — TELEPHONE ENCOUNTER
Spoke with pt and appt made for uptake and scan Feb 8th (pill at 7:15 and scan at 11:45) and Feb 9th 7:15 scan. Pt informed fasting    Verbalized understanding  Farida with [facility] notified

## 2018-01-29 ENCOUNTER — LAB VISIT (OUTPATIENT)
Dept: LAB | Facility: HOSPITAL | Age: 68
End: 2018-01-29
Attending: INTERNAL MEDICINE
Payer: MEDICARE

## 2018-01-29 DIAGNOSIS — L40.50 PSORIATIC ARTHRITIS: ICD-10-CM

## 2018-01-29 LAB
ALBUMIN SERPL BCP-MCNC: 3.1 G/DL
ALP SERPL-CCNC: 116 U/L
ALT SERPL W/O P-5'-P-CCNC: 58 U/L
ANION GAP SERPL CALC-SCNC: 6 MMOL/L
AST SERPL-CCNC: 36 U/L
BASOPHILS # BLD AUTO: 0.09 K/UL
BASOPHILS NFR BLD: 1.6 %
BILIRUB SERPL-MCNC: 0.5 MG/DL
BUN SERPL-MCNC: 18 MG/DL
CALCIUM SERPL-MCNC: 9.3 MG/DL
CHLORIDE SERPL-SCNC: 104 MMOL/L
CO2 SERPL-SCNC: 29 MMOL/L
CREAT SERPL-MCNC: 0.8 MG/DL
CRP SERPL-MCNC: 1.9 MG/L
DIFFERENTIAL METHOD: ABNORMAL
EOSINOPHIL # BLD AUTO: 0.2 K/UL
EOSINOPHIL NFR BLD: 3.6 %
ERYTHROCYTE [DISTWIDTH] IN BLOOD BY AUTOMATED COUNT: 14.2 %
ERYTHROCYTE [SEDIMENTATION RATE] IN BLOOD BY WESTERGREN METHOD: 20 MM/HR
EST. GFR  (AFRICAN AMERICAN): >60 ML/MIN/1.73 M^2
EST. GFR  (NON AFRICAN AMERICAN): >60 ML/MIN/1.73 M^2
GLUCOSE SERPL-MCNC: 219 MG/DL
HCT VFR BLD AUTO: 39.8 %
HGB BLD-MCNC: 12.9 G/DL
IMM GRANULOCYTES # BLD AUTO: 0.01 K/UL
IMM GRANULOCYTES NFR BLD AUTO: 0.2 %
LYMPHOCYTES # BLD AUTO: 1.9 K/UL
LYMPHOCYTES NFR BLD: 35 %
MCH RBC QN AUTO: 29.3 PG
MCHC RBC AUTO-ENTMCNC: 32.4 G/DL
MCV RBC AUTO: 91 FL
MONOCYTES # BLD AUTO: 0.9 K/UL
MONOCYTES NFR BLD: 16.7 %
NEUTROPHILS # BLD AUTO: 2.4 K/UL
NEUTROPHILS NFR BLD: 42.9 %
NRBC BLD-RTO: 0 /100 WBC
PLATELET # BLD AUTO: 276 K/UL
PMV BLD AUTO: 11.4 FL
POTASSIUM SERPL-SCNC: 5 MMOL/L
PROT SERPL-MCNC: 7.1 G/DL
RBC # BLD AUTO: 4.4 M/UL
SODIUM SERPL-SCNC: 139 MMOL/L
WBC # BLD AUTO: 5.52 K/UL

## 2018-01-29 PROCEDURE — 86140 C-REACTIVE PROTEIN: CPT

## 2018-01-29 PROCEDURE — 85025 COMPLETE CBC W/AUTO DIFF WBC: CPT

## 2018-01-29 PROCEDURE — 85651 RBC SED RATE NONAUTOMATED: CPT | Mod: PO

## 2018-01-29 PROCEDURE — 36415 COLL VENOUS BLD VENIPUNCTURE: CPT | Mod: PO

## 2018-01-29 PROCEDURE — 80053 COMPREHEN METABOLIC PANEL: CPT

## 2018-02-02 DIAGNOSIS — M19.90 INFLAMMATORY ARTHRITIS: ICD-10-CM

## 2018-02-02 DIAGNOSIS — L40.50 PSORIATIC ARTHRITIS: ICD-10-CM

## 2018-02-07 RX ORDER — LEFLUNOMIDE 10 MG/1
10 TABLET ORAL DAILY
Qty: 90 TABLET | Refills: 1 | Status: SHIPPED | OUTPATIENT
Start: 2018-02-07 | End: 2018-02-09 | Stop reason: SDUPTHER

## 2018-02-08 ENCOUNTER — HOSPITAL ENCOUNTER (OUTPATIENT)
Dept: RADIOLOGY | Facility: HOSPITAL | Age: 68
Discharge: HOME | End: 2018-02-08
Attending: NURSE PRACTITIONER
Payer: MEDICARE

## 2018-02-08 DIAGNOSIS — E04.2 MULTINODULAR THYROID: ICD-10-CM

## 2018-02-08 PROCEDURE — 78014 THYROID IMAGING W/BLOOD FLOW: CPT | Mod: 26,,, | Performed by: RADIOLOGY

## 2018-02-09 ENCOUNTER — HOSPITAL ENCOUNTER (OUTPATIENT)
Dept: RADIOLOGY | Facility: HOSPITAL | Age: 68
Discharge: HOME | End: 2018-02-09
Attending: NURSE PRACTITIONER
Payer: MEDICARE

## 2018-02-09 DIAGNOSIS — M19.90 INFLAMMATORY ARTHRITIS: ICD-10-CM

## 2018-02-09 DIAGNOSIS — L40.50 PSORIATIC ARTHRITIS: ICD-10-CM

## 2018-02-09 PROCEDURE — A9516 IODINE I-123 SOD IODIDE MIC: HCPCS | Mod: PO

## 2018-02-12 NOTE — TELEPHONE ENCOUNTER
----- Message from Stephanie Jolley sent at 2/12/2018  9:39 AM CST -----  Contact: self  Patient called regarding her test results and also need a refill of medication,insulin detemir (LEVEMIR) 100 unit/mL injection. Please contact [phone] (home)     [facility] - [place], [place] - [address] [place] [id number]  Phone: [phone] Fax: [phone]

## 2018-02-14 RX ORDER — HYDROXYCHLOROQUINE SULFATE 200 MG/1
200 TABLET, FILM COATED ORAL 2 TIMES DAILY
Qty: 180 TABLET | Refills: 0 | Status: SHIPPED | OUTPATIENT
Start: 2018-02-14 | End: 2018-07-11 | Stop reason: SDUPTHER

## 2018-02-14 RX ORDER — DULOXETIN HYDROCHLORIDE 60 MG/1
60 CAPSULE, DELAYED RELEASE ORAL DAILY
Qty: 90 CAPSULE | Refills: 3 | Status: SHIPPED | OUTPATIENT
Start: 2018-02-14 | End: 2018-03-14

## 2018-02-14 RX ORDER — LEFLUNOMIDE 10 MG/1
10 TABLET ORAL DAILY
Qty: 90 TABLET | Refills: 0 | Status: SHIPPED | OUTPATIENT
Start: 2018-02-14 | End: 2018-10-04 | Stop reason: SDUPTHER

## 2018-02-14 RX ORDER — GABAPENTIN 300 MG/1
300 CAPSULE ORAL 2 TIMES DAILY
Qty: 180 CAPSULE | Refills: 3 | Status: SHIPPED | OUTPATIENT
Start: 2018-02-14 | End: 2023-09-21

## 2018-02-15 ENCOUNTER — TELEPHONE (OUTPATIENT)
Dept: ENDOCRINOLOGY | Facility: CLINIC | Age: 68
End: 2018-02-15

## 2018-02-15 NOTE — TELEPHONE ENCOUNTER
This is Sloane's pt. Let her know that I reviewed her labs and uptake and scan. Will need to see me to discuss Tx options. Ok to use a wed spot

## 2018-02-21 ENCOUNTER — OFFICE VISIT (OUTPATIENT)
Dept: ENDOCRINOLOGY | Facility: CLINIC | Age: 68
End: 2018-02-21
Payer: MEDICARE

## 2018-02-21 VITALS
BODY MASS INDEX: 31.16 KG/M2 | HEIGHT: 65 IN | WEIGHT: 187 LBS | HEART RATE: 80 BPM | DIASTOLIC BLOOD PRESSURE: 66 MMHG | SYSTOLIC BLOOD PRESSURE: 118 MMHG

## 2018-02-21 DIAGNOSIS — E03.9 HYPOTHYROIDISM, UNSPECIFIED TYPE: Primary | ICD-10-CM

## 2018-02-21 DIAGNOSIS — E04.1 THYROID NODULE: ICD-10-CM

## 2018-02-21 PROCEDURE — 99214 OFFICE O/P EST MOD 30 MIN: CPT | Mod: S$PBB,,, | Performed by: INTERNAL MEDICINE

## 2018-02-21 PROCEDURE — 99999 PR PBB SHADOW E&M-EST. PATIENT-LVL III: CPT | Mod: PBBFAC,,, | Performed by: INTERNAL MEDICINE

## 2018-02-21 PROCEDURE — 99213 OFFICE O/P EST LOW 20 MIN: CPT | Mod: PBBFAC,PO | Performed by: INTERNAL MEDICINE

## 2018-02-21 RX ORDER — LEVOTHYROXINE SODIUM 75 UG/1
75 TABLET ORAL
Qty: 30 TABLET | Refills: 3 | Status: SHIPPED | OUTPATIENT
Start: 2018-02-21 | End: 2018-05-25

## 2018-02-21 RX ORDER — INSULIN ASPART 100 [IU]/ML
14 INJECTION, SOLUTION INTRAVENOUS; SUBCUTANEOUS
COMMUNITY
End: 2018-02-21 | Stop reason: SDUPTHER

## 2018-02-21 RX ORDER — MIRTAZAPINE 15 MG/1
15 TABLET, FILM COATED ORAL
COMMUNITY
Start: 2018-02-09

## 2018-02-21 RX ORDER — LEVOTHYROXINE SODIUM 88 UG/1
88 TABLET ORAL
COMMUNITY
Start: 2018-02-09 | End: 2018-02-21 | Stop reason: SDUPTHER

## 2018-02-21 RX ORDER — INSULIN ASPART 100 [IU]/ML
14 INJECTION, SOLUTION INTRAVENOUS; SUBCUTANEOUS
Qty: 6 VIAL | Refills: 3 | Status: SHIPPED | OUTPATIENT
Start: 2018-02-21

## 2018-02-21 RX ORDER — INSULIN GLARGINE 100 [IU]/ML
75 INJECTION, SOLUTION SUBCUTANEOUS
COMMUNITY
End: 2018-03-05

## 2018-02-21 RX ORDER — SYRINGE,SAFETY WITH NEEDLE,1ML 25GX1"
1 SYRINGE (EA) MISCELLANEOUS 4 TIMES DAILY
Qty: 400 EACH | Refills: 3 | Status: SHIPPED | OUTPATIENT
Start: 2018-02-21 | End: 2018-05-09 | Stop reason: SDUPTHER

## 2018-02-21 NOTE — PROGRESS NOTES
CHIEF COMPLAINT: Thyroid Nodule/Hypothyroid  67 year old being seen as a new patient. On synthroid 88 mcg. Had seen Dr. Sherwood in the past. NO diffiuclty swallowing. No change in voice. No palpitations. No tremors      PAST MEDICAL HISTORY/PAST SURGICAL HISTORY:  Reviewed in [facility]    SOCIAL HISTORY: No T/A    FAMILY HISTORY:  No known thyroid disease. No thyroid cancer    MEDICATIONS/ALLERGIES: The patient's MedCard has been updated and reviewed.      ROS:   Constitutional: No recent significant weight change  Eyes: No recent visual changes  ENT: No dysphagia  Cardiovascular: Denies current anginal symptoms  Respiratory: Denies current respiratory difficulty  Gastrointestinal: Denies recent bowel disturbances  GenitoUrinary - No dysuria  Skin: No new skin rash  Neurologic: No focal neurologic complaints  Remainder ROS negative        PE:    GENERAL: Well developed, well nourished.  ENT: Nares patent, oropharynx clear, mucosa pink,   NECK: Supple, trachea midline, Right nodule palpable.   CHEST: Resp even and unlabored, CTA bilateral.  CARDIAC: RRR, S1, S2 heard, no murmurs, rubs, S3, or S4    LABS   Results for JUDIE VANCE (MRN [id number]) as of 2/21/2018 15:12   Ref. Range 1/8/2018 09:16   TSH Latest Ref Range: 0.400 - 4.000 uIU/mL 0.056 (L)   Free T4 Latest Ref Range: 0.71 - 1.51 ng/dL 1.11     THYROID US:  Comparison:  None.    Findings: The thyroid gland is normal in size.  The right lobe measures 4.6 x 1.5 x 1.5cm and the left lobe measures 5.0 x 1.8 x 1.9cm.  The isthmus is 4.4mm in midline anteroposterior thickness.  The overall thyroid volume is 12.9 cc.      There is a dominant solid nodule identified in the lower pole of the right thyroid lymph node measuring 1.8 x 0.8 x 1.7 cm.  Several small subcentimeter nodules present elsewhere in both lobes.  No microcalcifications.  No cervical lymphadenopathy.   Impression      Dominant nodule lower pole right thyroid lobe.  FNA recommended.       THYROID UPTAKE AND  SCAN:  Procedure:  Following oral administration of 250 microCi I-123, thyroid uptake was calculated at 4.8 hours and 24-hour cysts. Imaging of the thyroid gland was also performed in multiple projections.    Findings:  Comparison is made to the prior thyroid ultrasound performed on 1/8/18.    The 4.8 hour thyroid uptake is abnormally low and measures 2.3 % (normal range 6-15 %). The 24-hour thyroid uptake is also abnormally low and measures 4.8 % (normal range 10-30 %).    Images of the thyroid gland demonstrate relatively decreased overall radiotracer accumulation by the thyroid lobes, with the exception of an oval-shaped nodular region of increased radiotracer accumulation in the lower pole of the right thyroid lobe which corresponds to a dominant nodule detected on the thyroid ultrasound. This nodular region of relatively increased radiotracer accumulation is suspicious for a hyperfunctioning autonomous nodule. No abnormal extrathyroidal radiotracer accumulation is identified.   Impression       1. Abnormally low thyroid uptake of 2.3 % at 4.8 hours and 4.8 % at 24 hours.  2. Abnormal thyroid scan with a nodular focus of abnormally increased radiotracer accumulation in the lower pole the right thyroid lobe corresponding to a dominant nodule on thyroid ultrasound which is suspicious for a hyperfunctioning autonomous nodule.           ASSESSMENT/PLAN:  1. Hypothyroidism- TSH Suppressed. Will decrease synthroid to 75 mcg and check TFT in 6 weeks.     2. Thyroid Nodule- Reviewed US and nodule appears amenable to FNA. Discussed will need to be off ASA x 3 days.       FOLLOWUP  TSH 6 weeks  Right FNA

## 2018-03-05 ENCOUNTER — OFFICE VISIT (OUTPATIENT)
Dept: ENDOCRINOLOGY | Facility: CLINIC | Age: 68
End: 2018-03-05
Payer: MEDICARE

## 2018-03-05 DIAGNOSIS — E04.2 MULTINODULAR GOITER: Primary | ICD-10-CM

## 2018-03-05 PROCEDURE — 99499 UNLISTED E&M SERVICE: CPT | Mod: S$PBB,,, | Performed by: INTERNAL MEDICINE

## 2018-03-05 PROCEDURE — 76942 ECHO GUIDE FOR BIOPSY: CPT | Mod: PBBFAC,PO | Performed by: INTERNAL MEDICINE

## 2018-03-05 PROCEDURE — 99213 OFFICE O/P EST LOW 20 MIN: CPT | Mod: PBBFAC,PO | Performed by: INTERNAL MEDICINE

## 2018-03-05 PROCEDURE — 88173 CYTOPATH EVAL FNA REPORT: CPT | Mod: 26,,, | Performed by: PATHOLOGY

## 2018-03-05 PROCEDURE — 99999 PR PBB SHADOW E&M-EST. PATIENT-LVL III: CPT | Mod: PBBFAC,,, | Performed by: INTERNAL MEDICINE

## 2018-03-05 PROCEDURE — 10022 PR FINE NEEDLE ASP;W/IMAGING GUIDANCE: CPT | Mod: PBBFAC,PO | Performed by: INTERNAL MEDICINE

## 2018-03-05 PROCEDURE — 88173 CYTOPATH EVAL FNA REPORT: CPT | Performed by: PATHOLOGY

## 2018-03-05 PROCEDURE — 10022 PR FINE NEEDLE ASP;W/IMAGING GUIDANCE: CPT | Mod: S$PBB,,, | Performed by: INTERNAL MEDICINE

## 2018-03-05 PROCEDURE — 76942 ECHO GUIDE FOR BIOPSY: CPT | Mod: 26,S$PBB,, | Performed by: INTERNAL MEDICINE

## 2018-03-05 NOTE — PROGRESS NOTES
Thyroid ultrasound for FNA    Indication:  Ultrasound guidance for fine needle aspiration biopsy right  Procedure time out noted. Patient's name, , and location of biopsy were verified with patient. Discussed risks of procedure and risks of a non diagnostic biopsy    Real time ultrasound was performed in 2 planes using a Issac ultrasound machine and 12 MHz probe.   The right nodule was identified and described in report.  Informed consent obtained and questions answered. FNA guidance was performed using direct u/s guidance to confirm accurate needle placement.  4 aspirations were made using 25 gauge needles.  Samples were submitted for cytology.  The patient tolerated the procedure well without complication.  After care instructions were provided.        Impression:  Uncomplicated FNA biopsy of right thyroid nodule under U/S guidance.

## 2018-03-07 DIAGNOSIS — E04.2 MULTINODULAR GOITER: Primary | ICD-10-CM

## 2018-03-07 NOTE — TELEPHONE ENCOUNTER
----- Message from Gema Thakkar sent at 3/7/2018  4:17 PM CST -----  Contact: patient   Patient requesting a prescription be called in for a two week supply of insulin detemir U-100 (LEVEMIR U-100 INSULIN) 100 unit/mL injection. She needs at least 1 vile until her mail order pharmacy medication arrives. Please advise.   Call back    Thanks!      [facility]/pharmacy #[id number] - [place] - [address]  [place] [address]  Phone: [phone] Fax: [phone]

## 2018-03-09 ENCOUNTER — TELEPHONE (OUTPATIENT)
Dept: ENDOCRINOLOGY | Facility: CLINIC | Age: 68
End: 2018-03-09

## 2018-03-09 NOTE — TELEPHONE ENCOUNTER
----- Message from Belle Gtz sent at 3/9/2018 10:49 AM CST -----  Contact: self  Patient [phone] is calling for her thyroid biopsy results from Monday 03 05 18/please call

## 2018-03-14 ENCOUNTER — OFFICE VISIT (OUTPATIENT)
Dept: RHEUMATOLOGY | Facility: CLINIC | Age: 68
End: 2018-03-14
Payer: MEDICARE

## 2018-03-14 VITALS
BODY MASS INDEX: 30.29 KG/M2 | HEIGHT: 66 IN | DIASTOLIC BLOOD PRESSURE: 84 MMHG | SYSTOLIC BLOOD PRESSURE: 140 MMHG | WEIGHT: 188.5 LBS | HEART RATE: 80 BPM

## 2018-03-14 DIAGNOSIS — G56.02 LEFT CARPAL TUNNEL SYNDROME: ICD-10-CM

## 2018-03-14 DIAGNOSIS — M65.312 TRIGGER FINGER OF LEFT THUMB: Primary | ICD-10-CM

## 2018-03-14 PROCEDURE — 99214 OFFICE O/P EST MOD 30 MIN: CPT | Mod: 25,S$PBB,, | Performed by: INTERNAL MEDICINE

## 2018-03-14 PROCEDURE — 20550 NJX 1 TENDON SHEATH/LIGAMENT: CPT | Mod: PBBFAC,PO | Performed by: INTERNAL MEDICINE

## 2018-03-14 PROCEDURE — 99213 OFFICE O/P EST LOW 20 MIN: CPT | Mod: PBBFAC,PO,25 | Performed by: INTERNAL MEDICINE

## 2018-03-14 PROCEDURE — 99999 PR PBB SHADOW E&M-EST. PATIENT-LVL III: CPT | Mod: PBBFAC,,, | Performed by: INTERNAL MEDICINE

## 2018-03-14 RX ORDER — METHYLPREDNISOLONE ACETATE 40 MG/ML
40 INJECTION, SUSPENSION INTRA-ARTICULAR; INTRALESIONAL; INTRAMUSCULAR; SOFT TISSUE
Status: DISCONTINUED | OUTPATIENT
Start: 2018-03-14 | End: 2018-03-14 | Stop reason: HOSPADM

## 2018-03-14 RX ADMIN — METHYLPREDNISOLONE ACETATE 40 MG: 40 INJECTION, SUSPENSION INTRA-ARTICULAR; INTRALESIONAL; INTRAMUSCULAR; SOFT TISSUE at 10:03

## 2018-03-14 NOTE — PROGRESS NOTES
Subjective:          Chief Complaint: Adina Li is a 67 y.o. female who had concerns including Disease Management.    HPI:    Patient is a 66-year-old female here for f/u of PsA vs EOA (+hx of psoriasis but no active plaques) and fibromyalgia.    Serologies: CCP negative,  rheumatoid factor negative,   Imaging with erosive changes at the capitate and DIP.   Labs 17 no toxicity  Doing well with Arava with nearly 100% difference in joint stiffness and pain.  No ASE.     Left MCP painful s/p bilateral CTR in  having tingling and numbness in fingers 1-3 on left. Some electric shock with heavy lifting. Few years ago with NCS with neuropathy, but past 3 months thumb is painful and hurts to grab. And  weakening.     Right 3rd early trigger finger-spoke Dr. Steiner.      HCQ 200mg QD,   Arava 10mg daily.     No significant benefit with MTX   Nabumetone 500mg BID PRN>      She previously started on methotrexate of 5 tabs weekly as well as Savella.She was on MTX years ago with little note of improvement.   Is a past medical history for type 2 diabetes mellitus, hypertension, IBS, previous myocardial infarction and diverticulitis. She has DIP pain, swelling and deformity. Some PIP, little MCP and some wrist tenderness. She has long hx of arthritis with bilateral TKA. She has hx of Lumbar DDD/DJD and cervical DDD with [facility]-Dr. Sim. Patient notes this started as a young woman 20s and 30s. She was rather active. She has some pain stiffness in shoulders. Currently right ring finger burns and stiff. Described as ache. he is taking Advil PM helps with pain and for sleep, chondroitin sulfate or move free. Recently started [place] water and feeling much better. She is s/p CAD with stenting x 2 and sister with MI  at 43y/o. She is cautious with NSAIDs. Patient with some dry mouth, no dry eyes, no serositis, pleurisy, ILD. ? Psoriasis scalp.  [facility] with cardiac work up no occlusive CAD.2017.        REVIEW  OF SYSTEMS:    Review of Systems   Constitutional: Negative for fever, malaise/fatigue and weight loss.   HENT: Negative for sore throat.    Eyes: Negative for double vision, photophobia and redness.   Respiratory: Negative for cough, shortness of breath and wheezing.    Cardiovascular: Negative for chest pain, palpitations and orthopnea.   Gastrointestinal: Negative for abdominal pain, constipation and diarrhea.   Genitourinary: Negative for dysuria, hematuria and urgency.   Musculoskeletal: Positive for back pain and joint pain. Negative for myalgias.   Skin: Negative for rash.   Neurological: Negative for dizziness, tingling, focal weakness and headaches.   Endo/Heme/Allergies: Does not bruise/bleed easily.   Psychiatric/Behavioral: Negative for depression, hallucinations and suicidal ideas.               Objective:            Past Medical History:   Diagnosis Date    Acid reflux     Anxiety     Arthritis     CAD (coronary artery disease)     Stant    Diabetes mellitus     Fibromyalgia     Hypertension     IBS (irritable bowel syndrome)     Myocardial infarction     Ulcerative colitis      Family History   Problem Relation Age of Onset    Colon cancer Mother     Heart attack Father     Heart attack Sister     Diabetes Sister     Breast cancer Sister      Social History   Substance Use Topics    Smoking status: Former Smoker     Quit date: 5/8/2005    Smokeless tobacco: Never Used    Alcohol use No         Current Outpatient Prescriptions on File Prior to Visit   Medication Sig Dispense Refill    aspirin 81 MG Chew Take 81 mg by mouth once daily.      b complex vitamins capsule Take 1 capsule by mouth once daily.      blood sugar diagnostic Strp 1 strip by Misc.(Non-Drug; Combo Route) route 4 (four) times daily. 150 each 11    cinnamon bark (CINNAMON) 500 mg capsule Take 500 mg by mouth once daily.      co-enzyme Q-10 30 mg capsule Take 100 mg by mouth once daily.      cranberry 500 mg Cap  Take 500 mg by mouth once daily.      CYANOCOBALAMIN, VITAMIN B-12, ORAL Take 1,000 mg by mouth once daily.      diphenoxylate-atropine 2.5-0.025 mg (LOMOTIL) 2.5-0.025 mg per tablet   1    ferrous sulfate 325 mg (65 mg iron) Tab tablet Take 325 mg by mouth once daily.      furosemide (LASIX) 40 MG tablet Take 40 mg by mouth as needed.      gabapentin (NEURONTIN) 300 MG capsule Take 1 capsule (300 mg total) by mouth 2 (two) times daily. 180 capsule 3    glucosamine-chondroitin 500-400 mg tablet Take 1 tablet by mouth once daily.      hydrocodone-acetaminophen 5-325mg (NORCO) 5-325 mg per tablet Take 1 tablet by mouth nightly as needed for Pain.      hydroxychloroquine (PLAQUENIL) 200 mg tablet Take 1 tablet (200 mg total) by mouth 2 (two) times daily. 180 tablet 0    insulin aspart U-100 (NOVOLOG) 100 unit/mL injection Inject 14 Units into the skin 3 (three) times daily before meals. 6 vial 3    insulin detemir U-100 (LEVEMIR U-100 INSULIN) 100 unit/mL injection Inject 75 Units into the skin every evening. 10 mL 1    insulin syringe-needle U-100 (BD INSULIN SYRINGE ULT-FINE II) 1 mL 31 gauge x 5/16 Syrg 1 applicator by Misc.(Non-Drug; Combo Route) route 4 (four) times daily. 400 each 3    krill-om3-dha-epa-om6-lip-astx (KRILL OIL, OMEGA 3 AND 6,) 1,500-165-67.5 mg Cap Take by mouth once daily.      leflunomide (ARAVA) 10 MG Tab Take 1 tablet (10 mg total) by mouth once daily. 90 tablet 0    levothyroxine (SYNTHROID) 75 MCG tablet Take 1 tablet (75 mcg total) by mouth before breakfast. 30 tablet 3    metoprolol succinate (TOPROL-XL) 25 MG 24 hr tablet Take 50 mg by mouth once daily.      mirtazapine (REMERON) 15 MG tablet Take 15 mg by mouth.      multivitamin (THERAGRAN) per tablet Take 1 tablet by mouth once daily.      nabumetone (RELAFEN) 500 MG tablet Take 500 mg by mouth once daily.      nitroGLYCERIN (NITROSTAT) 0.4 MG SL tablet PLACE ONE TABLET UNDER THE TONGUE EVERY 5 MINUTES as needed for  chest pain  1    pravastatin (PRAVACHOL) 20 MG tablet Take 20 mg by mouth once daily.      solifenacin (VESICARE) 10 MG tablet Take 10 mg by mouth every evening.       valsartan (DIOVAN) 80 MG tablet Take 80 mg by mouth once daily.      vitamin D 1000 units Tab Take 185 mg by mouth once daily.      folic acid (FOLVITE) 1 MG tablet Take 1 tablet (1 mg total) by mouth once daily. 30 tablet 5    [DISCONTINUED] DULoxetine (CYMBALTA) 60 MG capsule Take 1 capsule (60 mg total) by mouth once daily. 90 capsule 3     No current facility-administered medications on file prior to visit.        Vitals:    03/14/18 0908   BP: (!) 140/84   Pulse: 80       Physical Exam:    Physical Exam   Constitutional: She appears well-developed and well-nourished.   HENT:   Nose: No septal deviation.   Mouth/Throat: Mucous membranes are normal. No oral lesions.   Eyes: Pupils are equal, round, and reactive to light. Right conjunctiva is not injected. Left conjunctiva is not injected.   Neck: No JVD present. No thyroid mass and no thyromegaly present.   Cardiovascular: Normal rate, regular rhythm and normal pulses.    No edema   Pulmonary/Chest: Effort normal and breath sounds normal.   Abdominal: Soft. Normal appearance. There is no hepatosplenomegaly.   Musculoskeletal:        Right shoulder: She exhibits normal range of motion, no tenderness and no swelling.        Left shoulder: She exhibits normal range of motion, no tenderness and no swelling.        Right elbow: She exhibits normal range of motion and no swelling. No tenderness found.        Left elbow: She exhibits normal range of motion and no swelling. No tenderness found.        Right wrist: She exhibits normal range of motion, no tenderness and no swelling.        Left wrist: She exhibits normal range of motion, no tenderness and no swelling.        Right hip: She exhibits normal range of motion, normal strength and no swelling.        Left hip: She exhibits normal range of  motion, no tenderness and no swelling.        Right knee: She exhibits normal range of motion and no swelling. No tenderness found.        Left knee: She exhibits normal range of motion and no swelling. No tenderness found.        Right ankle: She exhibits normal range of motion and no swelling. No tenderness.        Left ankle: She exhibits normal range of motion and no swelling. No tenderness.        Right hand: She exhibits decreased range of motion, tenderness and deformity.        Left hand: She exhibits decreased range of motion, tenderness and deformity.        Left foot: There is tenderness and swelling.   Patient is a DIP bony hypertrophy with deformity particular the second DIP bilaterally.  She has slight erythema at the third fourth and fifth right DIP she's difficulty with finger curl  strength reduced . no active synovitis in the wrist MCPs or PIPs.    Midfoot dorsal extensor tendons slight swelling and tenderness   Lymphadenopathy:     She has no cervical adenopathy.     She has no axillary adenopathy.   Neurological: She has normal strength and normal reflexes.   Skin: Skin is dry and intact.   Psychiatric: She has a normal mood and affect.             Assessment:       Encounter Diagnoses   Name Primary?    Trigger finger of left thumb Yes    Left carpal tunnel syndrome           Plan:        Trigger finger of left thumb  -     Tendon Sheath    Left carpal tunnel syndrome  -     EMG W/ ULTRASOUND AND NERVE CONDUCTION TEST 1 Extremity; Future          Nabumetone 500mg twice daily is ok for now be advised this is an NSAID  Continue Leflunomide 10mg daily-doing great with this. \  Labs every 3 months   Hydroxychloroquine 200mg SAVAGE  Off  Duloxetine (Cymbalta) 60mg daily due to insurance cost has been off.   Continue Gabapentin     Injected left thumb trigger finger  Suspect left CTS will get EMG/NCS    No Follow-up on file.          30min consultation with greater than 50% spent in counseling,  chart review and coordination of care. All questions answered. Patient medication list with duplications of NSAIDs and confusion over medications which I have clarified each and every medication today.

## 2018-03-14 NOTE — PROCEDURES
Tendon Sheath  Date/Time: 3/14/2018 10:05 AM  Performed by: ANATOLIY LEMONS  Authorized by: ANATOLIY LEMONS     Consent Done?:  Yes (Verbal)  Timeout: prior to procedure the correct patient, procedure, and site was verified    Indications:  Pain  Site marked: the procedure site was marked    Timeout: prior to procedure the correct patient, procedure, and site was verified    Location: left first flexor tendon.  Medications:  40 mg methylPREDNISolone acetate 40 mg/mL  Patient tolerance:  Patient tolerated the procedure well with no immediate complications   Patient informed of the risks, benefits, side effects of corticosteroid injections including but not limited to skin hypopigmentation, atrophy, ineffectiveness and infection.

## 2018-03-22 ENCOUNTER — TELEPHONE (OUTPATIENT)
Dept: ENDOCRINOLOGY | Facility: CLINIC | Age: 68
End: 2018-03-22

## 2018-03-22 NOTE — TELEPHONE ENCOUNTER
"----- Message from Belle Gtz sent at 3/22/2018  2:51 PM CDT -----  Contact: self  Patient [phone] is calling/her blood sugar is over 300 almost all the time since she has gotten off the Metformin/she has applied with [facility] to try to get some medication help/It is $646 for one medication/she is in the [place] with Medicare/she stated that even when she tries to eat properly her insulin is still "way up there"  "

## 2018-03-22 NOTE — TELEPHONE ENCOUNTER
----- Message from Gema Thakkar sent at 3/22/2018  3:32 PM CDT -----  Contact: patient   Patient returning a missed call. Please advise. Call to pod. No answer.   Call back   Thanks!

## 2018-03-22 NOTE — TELEPHONE ENCOUNTER
Left message to return call to clinic  Instructed pt this clinic advises to go to the Emergency Department to get IV fluids and insulin treatments for BG stabilization if blood glucose reading are >350.     Please advise

## 2018-03-23 ENCOUNTER — TELEPHONE (OUTPATIENT)
Dept: ENDOCRINOLOGY | Facility: CLINIC | Age: 68
End: 2018-03-23

## 2018-03-23 NOTE — TELEPHONE ENCOUNTER
----- Message from Belle Phan sent at 3/23/2018 10:19 AM CDT -----  Contact: Adina  Patient is returning call and states got message; states almost everyday goes over the 350 level. Any questions please call [phone]. Thanks!

## 2018-03-27 RX ORDER — INSULIN DETEMIR 100 [IU]/ML
75 INJECTION, SOLUTION SUBCUTANEOUS NIGHTLY
Qty: 10 ML | Refills: 1 | Status: SHIPPED | OUTPATIENT
Start: 2018-03-27

## 2018-04-05 ENCOUNTER — TELEPHONE (OUTPATIENT)
Dept: ENDOCRINOLOGY | Facility: CLINIC | Age: 68
End: 2018-04-05

## 2018-04-05 DIAGNOSIS — Z79.4 TYPE 2 DIABETES MELLITUS WITH OTHER NEUROLOGIC COMPLICATION, WITH LONG-TERM CURRENT USE OF INSULIN: Primary | ICD-10-CM

## 2018-04-05 DIAGNOSIS — E11.49 TYPE 2 DIABETES MELLITUS WITH OTHER NEUROLOGIC COMPLICATION, WITH LONG-TERM CURRENT USE OF INSULIN: Primary | ICD-10-CM

## 2018-04-05 RX ORDER — METFORMIN HYDROCHLORIDE 500 MG/1
1000 TABLET, EXTENDED RELEASE ORAL 2 TIMES DAILY WITH MEALS
Qty: 120 TABLET | Refills: 6 | Status: SHIPPED | OUTPATIENT
Start: 2018-04-05 | End: 2018-10-29

## 2018-04-05 NOTE — TELEPHONE ENCOUNTER
Spoke with pt and message per ALLAN Fletcher given:  Please notify patient that I will send rx for metformin XR to her pharmacy. This is more gentle on the stomach than regular metformin. I would recommend that she start with 500 mg twice daily and if no diarrhea in 1 week, may take 1000 mg twice daily (2 tabs twice daily)    Verbalized understanding

## 2018-04-05 NOTE — TELEPHONE ENCOUNTER
----- Message from Lilian Dalton sent at 4/5/2018  1:03 PM CDT -----  needs to be put back on metformin 1000mg (2/day), pls call in 3 mth refill..[phone]     [facility]

## 2018-04-05 NOTE — TELEPHONE ENCOUNTER
Please notify patient that I will send rx for metformin XR to her pharmacy. This is more gentle on the stomach than regular metformin. I would recommend that she start with 500 mg twice daily and if no diarrhea in 1 week, may take 1000 mg twice daily (2 tabs twice daily)

## 2018-04-05 NOTE — TELEPHONE ENCOUNTER
Pt states she had been off Metformin because it did not appear to be working. States sugars were in the 300s after being off metformin. Reports that she had some meformin left ofter and decided to take it and since then BS have been in the mid 100s  Asking to be placed back on Metformin 1000 mg BID

## 2018-04-10 ENCOUNTER — LAB VISIT (OUTPATIENT)
Dept: LAB | Facility: HOSPITAL | Age: 68
End: 2018-04-10
Attending: INTERNAL MEDICINE
Payer: MEDICARE

## 2018-04-10 DIAGNOSIS — E03.9 ACQUIRED HYPOTHYROIDISM: ICD-10-CM

## 2018-04-10 LAB — TSH SERPL DL<=0.005 MIU/L-ACNC: 0.64 UIU/ML

## 2018-04-10 PROCEDURE — 36415 COLL VENOUS BLD VENIPUNCTURE: CPT | Mod: PO

## 2018-04-10 PROCEDURE — 84443 ASSAY THYROID STIM HORMONE: CPT

## 2018-04-30 ENCOUNTER — LAB VISIT (OUTPATIENT)
Dept: LAB | Facility: HOSPITAL | Age: 68
End: 2018-04-30
Attending: INTERNAL MEDICINE
Payer: MEDICARE

## 2018-04-30 DIAGNOSIS — L40.50 PSORIATIC ARTHRITIS: ICD-10-CM

## 2018-04-30 LAB
ALBUMIN SERPL BCP-MCNC: 3.6 G/DL
ALP SERPL-CCNC: 124 U/L
ALT SERPL W/O P-5'-P-CCNC: 54 U/L
ANION GAP SERPL CALC-SCNC: 9 MMOL/L
AST SERPL-CCNC: 37 U/L
BASOPHILS # BLD AUTO: 0.08 K/UL
BASOPHILS NFR BLD: 1.7 %
BILIRUB SERPL-MCNC: 0.4 MG/DL
BUN SERPL-MCNC: 17 MG/DL
CALCIUM SERPL-MCNC: 10.1 MG/DL
CHLORIDE SERPL-SCNC: 102 MMOL/L
CO2 SERPL-SCNC: 28 MMOL/L
CREAT SERPL-MCNC: 0.9 MG/DL
CRP SERPL-MCNC: 1.6 MG/L
DIFFERENTIAL METHOD: NORMAL
EOSINOPHIL # BLD AUTO: 0.1 K/UL
EOSINOPHIL NFR BLD: 2.3 %
ERYTHROCYTE [DISTWIDTH] IN BLOOD BY AUTOMATED COUNT: 12.7 %
ERYTHROCYTE [SEDIMENTATION RATE] IN BLOOD BY WESTERGREN METHOD: 20 MM/HR
EST. GFR  (AFRICAN AMERICAN): >60 ML/MIN/1.73 M^2
EST. GFR  (NON AFRICAN AMERICAN): >60 ML/MIN/1.73 M^2
GLUCOSE SERPL-MCNC: 237 MG/DL
HCT VFR BLD AUTO: 43.6 %
HGB BLD-MCNC: 14.6 G/DL
IMM GRANULOCYTES # BLD AUTO: 0.01 K/UL
IMM GRANULOCYTES NFR BLD AUTO: 0.2 %
LYMPHOCYTES # BLD AUTO: 1.6 K/UL
LYMPHOCYTES NFR BLD: 32.9 %
MCH RBC QN AUTO: 30.4 PG
MCHC RBC AUTO-ENTMCNC: 33.5 G/DL
MCV RBC AUTO: 91 FL
MONOCYTES # BLD AUTO: 0.7 K/UL
MONOCYTES NFR BLD: 14.9 %
NEUTROPHILS # BLD AUTO: 2.3 K/UL
NEUTROPHILS NFR BLD: 48 %
NRBC BLD-RTO: 0 /100 WBC
PLATELET # BLD AUTO: 296 K/UL
PMV BLD AUTO: 11.6 FL
POTASSIUM SERPL-SCNC: 4.4 MMOL/L
PROT SERPL-MCNC: 7.6 G/DL
RBC # BLD AUTO: 4.8 M/UL
SODIUM SERPL-SCNC: 139 MMOL/L
WBC # BLD AUTO: 4.84 K/UL

## 2018-04-30 PROCEDURE — 86140 C-REACTIVE PROTEIN: CPT

## 2018-04-30 PROCEDURE — 85651 RBC SED RATE NONAUTOMATED: CPT | Mod: PO

## 2018-04-30 PROCEDURE — 80053 COMPREHEN METABOLIC PANEL: CPT

## 2018-04-30 PROCEDURE — 85025 COMPLETE CBC W/AUTO DIFF WBC: CPT

## 2018-04-30 PROCEDURE — 36415 COLL VENOUS BLD VENIPUNCTURE: CPT | Mod: PO

## 2018-04-30 NOTE — PROGRESS NOTES
Your labs look fine. Liver enzyme slightly elevated but actually improved from last labs 3 months ago. Dr. BUSTAMANTE

## 2018-05-02 ENCOUNTER — TELEPHONE (OUTPATIENT)
Dept: ENDOCRINOLOGY | Facility: CLINIC | Age: 68
End: 2018-05-02

## 2018-05-02 NOTE — TELEPHONE ENCOUNTER
Pt dropped off [employer] patient assistance program reorder request. Will complete with ALLAN Hartman signature and send t [place]

## 2018-05-02 NOTE — TELEPHONE ENCOUNTER
----- Message from Belle Gtz sent at 5/2/2018 12:22 PM CDT -----  Contact: self  Patient [phone] is calling/she is dropping off some paperwork to get help with her insulin and is asking if she could please get this paperwork filled out as soon as possible so she can start getting help

## 2018-05-08 NOTE — TELEPHONE ENCOUNTER
----- Message from Ingrid Gallardo sent at 5/8/2018  1:50 PM CDT -----  Contact: [phone]  Patient is requesting a call back from the nurse stated thank you for filling out of the papers and mailing it in, she need a letter of medical necessity.    Please call the patient upon request at phone number [phone].

## 2018-05-08 NOTE — TELEPHONE ENCOUNTER
Lora, you helped pt w/ assistance from [employer].  She states she also needed a letter of medical necessity.  Is this something you can help her with?

## 2018-05-09 RX ORDER — SYRINGE,SAFETY WITH NEEDLE,1ML 25GX1"
1 SYRINGE (EA) MISCELLANEOUS 4 TIMES DAILY
Qty: 400 EACH | Refills: 3 | Status: SHIPPED | OUTPATIENT
Start: 2018-05-09

## 2018-05-09 RX ORDER — METOPROLOL SUCCINATE 50 MG/1
TABLET, EXTENDED RELEASE ORAL
COMMUNITY
Start: 2018-05-05

## 2018-05-09 RX ORDER — NABUMETONE 500 MG/1
TABLET, FILM COATED ORAL
COMMUNITY
Start: 2018-04-05 | End: 2018-10-29

## 2018-05-09 RX ORDER — DULOXETIN HYDROCHLORIDE 60 MG/1
CAPSULE, DELAYED RELEASE ORAL
Refills: 2 | COMMUNITY
Start: 2018-04-05 | End: 2018-07-26 | Stop reason: SDUPTHER

## 2018-05-09 RX ORDER — PEN NEEDLE, DIABETIC 30 GX3/16"
NEEDLE, DISPOSABLE MISCELLANEOUS
COMMUNITY
Start: 2018-02-22

## 2018-05-09 RX ORDER — VALSARTAN 80 MG/1
80 TABLET ORAL
COMMUNITY
Start: 2018-04-06 | End: 2018-10-29

## 2018-05-09 RX ORDER — MIRTAZAPINE 30 MG/1
TABLET, FILM COATED ORAL
COMMUNITY
Start: 2018-04-19 | End: 2018-05-25

## 2018-05-09 RX ORDER — LEVOTHYROXINE SODIUM 88 UG/1
TABLET ORAL
COMMUNITY
Start: 2018-05-05

## 2018-05-09 NOTE — TELEPHONE ENCOUNTER
Spoke to pt and adv would send refill request to [facility] for approval and to check with pharmacy later today. Also stated she needs a letter of medical necessity to try and get financial assistance with her insulin.

## 2018-05-09 NOTE — TELEPHONE ENCOUNTER
----- Message from Rand Sherwood sent at 5/9/2018  1:13 PM CDT -----  Contact: PT  Pt Adina Li calling to get a prescription refill on her Lancet and syringe. She would like to also verify some other information. Please call back and advise.    Call back# [phone]  Thanks   [place] on [address] (pt does not know number)

## 2018-05-21 ENCOUNTER — LAB VISIT (OUTPATIENT)
Dept: LAB | Facility: HOSPITAL | Age: 68
End: 2018-05-21
Attending: NURSE PRACTITIONER
Payer: MEDICARE

## 2018-05-21 DIAGNOSIS — Z79.4 TYPE 2 DIABETES MELLITUS WITH OTHER NEUROLOGIC COMPLICATION, WITH LONG-TERM CURRENT USE OF INSULIN: ICD-10-CM

## 2018-05-21 DIAGNOSIS — E11.49 TYPE 2 DIABETES MELLITUS WITH OTHER NEUROLOGIC COMPLICATION, WITH LONG-TERM CURRENT USE OF INSULIN: ICD-10-CM

## 2018-05-21 LAB
ESTIMATED AVG GLUCOSE: 235 MG/DL
HBA1C MFR BLD HPLC: 9.8 %

## 2018-05-21 PROCEDURE — 36415 COLL VENOUS BLD VENIPUNCTURE: CPT | Mod: PO

## 2018-05-21 PROCEDURE — 83036 HEMOGLOBIN GLYCOSYLATED A1C: CPT

## 2018-05-25 ENCOUNTER — OFFICE VISIT (OUTPATIENT)
Dept: ENDOCRINOLOGY | Facility: CLINIC | Age: 68
End: 2018-05-25
Payer: MEDICARE

## 2018-05-25 VITALS
RESPIRATION RATE: 18 BRPM | HEART RATE: 92 BPM | SYSTOLIC BLOOD PRESSURE: 130 MMHG | HEIGHT: 66 IN | WEIGHT: 185.63 LBS | BODY MASS INDEX: 29.83 KG/M2 | DIASTOLIC BLOOD PRESSURE: 66 MMHG

## 2018-05-25 DIAGNOSIS — Z79.4 TYPE 2 DIABETES MELLITUS WITH OTHER NEUROLOGIC COMPLICATION, WITH LONG-TERM CURRENT USE OF INSULIN: Primary | ICD-10-CM

## 2018-05-25 DIAGNOSIS — E03.9 ACQUIRED HYPOTHYROIDISM: ICD-10-CM

## 2018-05-25 DIAGNOSIS — G63 POLYNEUROPATHY ASSOCIATED WITH UNDERLYING DISEASE: ICD-10-CM

## 2018-05-25 DIAGNOSIS — E11.49 TYPE 2 DIABETES MELLITUS WITH OTHER NEUROLOGIC COMPLICATION, WITH LONG-TERM CURRENT USE OF INSULIN: Primary | ICD-10-CM

## 2018-05-25 DIAGNOSIS — E04.2 MULTINODULAR THYROID: ICD-10-CM

## 2018-05-25 PROCEDURE — 99215 OFFICE O/P EST HI 40 MIN: CPT | Mod: S$PBB,,, | Performed by: NURSE PRACTITIONER

## 2018-05-25 PROCEDURE — 99215 OFFICE O/P EST HI 40 MIN: CPT | Mod: PBBFAC,PO | Performed by: NURSE PRACTITIONER

## 2018-05-25 PROCEDURE — 99999 PR PBB SHADOW E&M-EST. PATIENT-LVL V: CPT | Mod: PBBFAC,,, | Performed by: NURSE PRACTITIONER

## 2018-05-25 RX ORDER — GLUCOSAMINE/CHONDR SU A SOD 167-133 MG
100 CAPSULE ORAL NIGHTLY
COMMUNITY
End: 2018-10-29

## 2018-05-25 RX ORDER — MAGNESIUM 30 MG
TABLET ORAL ONCE
COMMUNITY
End: 2018-10-29

## 2018-05-25 NOTE — PROGRESS NOTES
Subjective:       Patient ID: Adina Li is a 67 y.o. female.    Chief Complaint: routine f/u Type 2 DM     HPI Pt is a 67 y.o. WF with a diagnosis of Type 2 diabetes mellitus diagnosed approximately 2004, as well as chronic conditions pending review including HTN, HLP, hypothyroidism and reported thyroid nodules. .  Other pertinent medical and social information noted includes, but not limited to: propensity for depression and anxiety.     Interim Events: Pt with 3 steroid injections int he interim.  Pt was see  In interim by Dr. Segal for hypothyroidism turned subclinical hyperthyroid. Pt biggest issue is inability to afford insulin.  She is taking 75 units a night of Levemir and Novolog 15 units with ss per meal.  However, important to note--she is taking 15 plus ss if not eating.  No c/o hypoglycemia.  No logs today.  States checking  TID and almost all numbers are near 300.  She does have some issues with depression r/t prior event and undergoing counseling/psychiatry care for same.        Diabetes Flow Sheet:  Diabetes Medications: levemir 75 qhs, Novolog  15-15-15 plus ss     If insulin vial or pen preference:vial==r/t medicare gap   Prior failed/or not tolerated medication therapies:  Diabetes Complications:peripheral neuropathy,  Aspirin: 81 mg   Statin: pravastatin 20   ACE/ARB:valsartan 80 mg   Last Urine Microalbumin: pending every 6 mo   Last Eye exam:q 6 mo   Last Diabetic Education: pending   Glucometer: unknown        Review of Systems   Constitutional: Positive for fatigue. Negative for activity change.   HENT: Negative for hearing loss and trouble swallowing.    Eyes: Negative for photophobia and visual disturbance.        Last Eye Exam   Respiratory: Negative for cough and shortness of breath.    Cardiovascular: Negative for chest pain and palpitations.   Gastrointestinal: Positive for diarrhea (chronic but  worsened recently ). Negative for constipation.   Endocrine: Negative for heat  intolerance.   Genitourinary: Negative for frequency and urgency.   Musculoskeletal: Negative for arthralgias and myalgias.   Skin: Negative for rash and wound.   Allergic/Immunologic: Negative for food allergies.   Neurological: Positive for numbness (hands). Negative for weakness.   Psychiatric/Behavioral: Positive for sleep disturbance. The patient is not nervous/anxious.         Worsened anxiety and depression.        Objective:      Physical Exam   Constitutional: She is oriented to person, place, and time. She appears well-developed and well-nourished.   HENT:   Head: Normocephalic and atraumatic.   Nose: Nose normal.   Mouth/Throat: Oropharynx is clear and moist.   Eyes: Conjunctivae and EOM are normal. Pupils are equal, round, and reactive to light.   Neck: Normal range of motion. Neck supple. No tracheal deviation present. No thyromegaly present.   Cardiovascular: Normal rate, regular rhythm and normal heart sounds.    Pulmonary/Chest: Effort normal and breath sounds normal.   Musculoskeletal: Normal range of motion. She exhibits no deformity.   Feet:    Neurological: She is alert and oriented to person, place, and time.   Skin: Skin is warm and dry.   Psychiatric: She has a normal mood and affect. Her behavior is normal. Judgment and thought content normal.   Vitals reviewed.      Hemoglobin A1C   Date Value Ref Range Status   05/21/2018 9.8 (H) 4.0 - 5.6 % Final     Comment:     According to ADA guidelines, hemoglobin A1c <7.0% represents  optimal control in non-pregnant diabetic patients. Different  metrics may apply to specific patient populations.   Standards of Medical Care in Diabetes-2016.  For the purpose of screening for the presence of diabetes:  <5.7%     Consistent with the absence of diabetes  5.7-6.4%  Consistent with increasing risk for diabetes   (prediabetes)  >or=6.5%  Consistent with diabetes  Currently, no consensus exists for use of hemoglobin A1c  for diagnosis of diabetes for  children.  This Hemoglobin A1c assay has significant interference with fetal   hemoglobin   (HbF). The results are invalid for patients with abnormal amounts of   HbF,   including those with known Hereditary Persistence   of Fetal Hemoglobin. Heterozygous hemoglobin variants (HbAS, HbAC,   HbAD, HbAE, HbA2) do not significantly interfere with this assay;   however, presence of multiple variants in a sample may impact the %   interference.     01/08/2018 8.2 (H) 4.0 - 5.6 % Final     Comment:     According to ADA guidelines, hemoglobin A1c <7.0% represents  optimal control in non-pregnant diabetic patients. Different  metrics may apply to specific patient populations.   Standards of Medical Care in Diabetes-2016.  For the purpose of screening for the presence of diabetes:  <5.7%     Consistent with the absence of diabetes  5.7-6.4%  Consistent with increasing risk for diabetes   (prediabetes)  >or=6.5%  Consistent with diabetes  Currently, no consensus exists for use of hemoglobin A1c  for diagnosis of diabetes for children.  This Hemoglobin A1c assay has significant interference with fetal   hemoglobin   (HbF). The results are invalid for patients with abnormal amounts of   HbF,   including those with known Hereditary Persistence   of Fetal Hemoglobin. Heterozygous hemoglobin variants (HbAS, HbAC,   HbAD, HbAE, HbA2) do not significantly interfere with this assay;   however, presence of multiple variants in a sample may impact the %   interference.         Chemistry        Component Value Date/Time     04/30/2018 1033    K 4.4 04/30/2018 1033     04/30/2018 1033    CO2 28 04/30/2018 1033    BUN 17 04/30/2018 1033    CREATININE 0.9 04/30/2018 1033     (H) 04/30/2018 1033        Component Value Date/Time    CALCIUM 10.1 04/30/2018 1033    ALKPHOS 124 04/30/2018 1033    AST 37 04/30/2018 1033    ALT 54 (H) 04/30/2018 1033    BILITOT 0.4 04/30/2018 1033    ESTGFRAFRICA >60.0 04/30/2018 1033     EGFRNONAA >60.0 04/30/2018 1033        Lab Results   Component Value Date    LDLCALC 90.8 01/08/2018     Lab Results   Component Value Date    TSH 0.637 04/10/2018     Component      Latest Ref Rng & Units 1/8/2018   Microalbum.,U,Random      ug/mL 17.0   Creatinine, Random Ur      15.0 - 325.0 mg/dL 148.0   Microalb Creat Ratio      0.0 - 30.0 ug/mg 11.5       Assessment:       1. Type 2 diabetes mellitus with other neurologic complication, with long-term current use of insulin  Hemoglobin A1c  Chronic-uncontrolled-see plan     Ambulatory Referral to Diabetes Education   2. Multinodular thyroid  -chronic-per dr. Segal-notes/US reviewed.    3. Acquired hypothyroidism  -vwktgrf-vjwtoq-qtba levo .88   4. Polyneuropathy associated with underlying disease  -chronic-stable-monitor foot care.         Plan:       increase Levemir to 85 units at bedtime  Increase Novolog 20  (with meals) plus correction scale if needed.     Once you run out of Novolog  Go to [facility]--and get Relion REGULAR insulin  You may take 20 units plus scale of this before meals instead of Novolog    If you are skipping a meal, and glucose is high, you may take the scale dose of Novolog/regular.        Going into future will need to use N & R    Consider 40 NPH & 20 Reg plus ss,     40 NPH and 20 Reg with supper--pt advised to bring glucose logs for training so finally dosing could be calculated.      ORDERS 05/25/2018     Cons diabete ed---split mix N and R. ---in about 1 month       3 mo with a1c.      40 min >50% counseling on difference of meal insulin vs correction dose--basal/bolus therapy vs conventional--how how cheap insulin is not convenient.

## 2018-05-25 NOTE — PATIENT INSTRUCTIONS
For now:    Increase Levemir to 85 units at bedtime  Increase Novolog 20  (with meals) plus correction scale if needed.     Once you run out of Novolog  Go to [facility] ONLY--and get Relion REGULAR insulin  You may take 20 units plus scale of this before meals instead of Novolog    If you are skipping a meal, and glucose is high, you may take the scale dose of Novolog/regular.

## 2018-05-26 ENCOUNTER — PATIENT MESSAGE (OUTPATIENT)
Dept: ENDOCRINOLOGY | Facility: CLINIC | Age: 68
End: 2018-05-26

## 2018-06-11 ENCOUNTER — TELEPHONE (OUTPATIENT)
Dept: ENDOCRINOLOGY | Facility: CLINIC | Age: 68
End: 2018-06-11

## 2018-06-11 NOTE — TELEPHONE ENCOUNTER
Spoke with pt and adv her appt with education on that insulin training is not until 06/25, adv we can give a sample of novolog to last until that appt, voiced understanding.

## 2018-06-11 NOTE — TELEPHONE ENCOUNTER
----- Message from Lori Allison sent at 6/11/2018  3:47 PM CDT -----  Type: Needs Medical Advice    Who Called:  Patient  Best Call Back Number: [phone]  Additional Information: Patient says that she was supposed to come today and  some samples. But because of the weather, she will come tomorrow. Call, if needed.

## 2018-06-11 NOTE — TELEPHONE ENCOUNTER
----- Message from Arlene Dangelo sent at 6/11/2018  9:55 AM CDT -----  She only has enough novalog for today.She picked up  N&R, but she does not know how to take it.  Please call her to advise her how to take it or should she get another vial of the novalog?  Thank you!

## 2018-06-11 NOTE — TELEPHONE ENCOUNTER
----- Message from Jaycee Artis sent at 6/11/2018 11:02 AM CDT -----            Name of Who is Calling: dayana      What is the request in detail: pt. Returning pone call. Please call to discuss      Can the clinic reply by [facility]: no      What Number to Call Back [facility] not [facility]: [phone]

## 2018-06-12 RX ORDER — INSULIN ASPART 100 [IU]/ML
30 INJECTION, SOLUTION INTRAVENOUS; SUBCUTANEOUS
Qty: 2 VIAL | Refills: 11 | COMMUNITY
Start: 2018-06-12 | End: 2019-03-25 | Stop reason: SDUPTHER

## 2018-06-25 ENCOUNTER — CLINICAL SUPPORT (OUTPATIENT)
Dept: DIABETES | Facility: CLINIC | Age: 68
End: 2018-06-25
Payer: MEDICARE

## 2018-06-25 VITALS — WEIGHT: 188.69 LBS | HEIGHT: 66 IN | BODY MASS INDEX: 30.33 KG/M2

## 2018-06-25 DIAGNOSIS — E11.49 TYPE 2 DIABETES MELLITUS WITH OTHER NEUROLOGIC COMPLICATION, WITH LONG-TERM CURRENT USE OF INSULIN: Primary | ICD-10-CM

## 2018-06-25 DIAGNOSIS — Z79.4 TYPE 2 DIABETES MELLITUS WITH OTHER NEUROLOGIC COMPLICATION, WITH LONG-TERM CURRENT USE OF INSULIN: Primary | ICD-10-CM

## 2018-06-25 PROCEDURE — G0108 DIAB MANAGE TRN  PER INDIV: HCPCS | Mod: PBBFAC,PO | Performed by: DIETITIAN, REGISTERED

## 2018-06-25 PROCEDURE — 99211 OFF/OP EST MAY X REQ PHY/QHP: CPT | Mod: PBBFAC,PO

## 2018-06-25 PROCEDURE — 99999 PR PBB SHADOW E&M-EST. PATIENT-LVL I: CPT | Mod: PBBFAC,,,

## 2018-06-25 NOTE — PROGRESS NOTES
Diabetes Education  Author: Kari Barros RD  Date: 6/25/2018    Diabetes Education Visit  Diabetes Education Record Assessment/Progress: Comprehensive/Group    Diabetes Type  Diabetes Type : Type II    Monitoring   Monitoring: Contour Next EZ  Self Monitoring : Checking 3 times daily before meals  Blood Glucose Logs: Yes, patient is self dosing Novolog as she is confused between base dose and correction/sliding scale. Thought her base dose ranged from 20-35 and then would add correction/sliding scale. Most of appt today spent teaching difference between base dose and correction/sliding scale. Reports some missed doses of Novolog, mostly when dining out and travelling (on 6/20 and 6/21 did not have glucometer did not give Novolog insulin).  Do you use a personal glucose monitor?: No  In the last month, how often have you had a low blood sugar reaction?: never  Can you tell when your blood sugar is too high?: no              Current Diabetes Treatment   Current Treatment: Insulin (Levemir 85 units QHS, Novolog 20 units plus sliding scale/correction). Correction/sliding scale for Novolog/Regular insulin per Sloane London NP: 150-200 = 3 units, 201-250 = 6 units, 251-300= 9 units, 301-350 = 12 units, > 350 = 15 units.     Patient here today to learn use of Relion N and Relion R to replace Levemir & Novolog due to cost.  However, patient reports she still has 1-2 vials of Levemir and 1-2 vials of Novolog and would like to finish using these before switching to the Relion N & R insulin (which she has purchased from [employer] already).  Also reports she applied for patient assistance with medication costs (through [employer] patient believes?) and is awaiting to see if she qualifies. Asked patient to be consistent with Novolog dosing--keep base dose at 20 units at each meal plus sliding scale/correction based on pre meal glucose readings.  Continue Levemir at 85 units QHS.      When Levemir and Novolog supplies run out,  patient to switch to:   Before breakfast: Relion N 40 units + Relion R 20 units plus correction/sliding scale (150-200 = 3 units, 201-250 = 6 units, 251-300= 9 units, 301-350 = 12 units, > 350 = 15 units)   Before dinner: Relion N 40 units + Relion R 20 units plus correction/sliding scale (150-200 = 3 units, 201-250 = 6 units, 251-300= 9 units, 301-350 = 12 units, > 350 = 15 units)     Patient taught how to mix cloudy (N) and clear (R) insulin in one syringe, educational booklet on mixing insulin provided to patient.        Barriers to Change  Barriers to Change: [facility] ([facility] plans to change to Relion N & Relion R due to cost)  Learning Challenges : None    Readiness to Learn   Readiness to Learn : Acceptance    Cultural Influences  Cultural Influences: None    Diabetes Education Assessment/Progress  Nutrition (Incorporating nutritional management into one's lifestyle): Discussion, Written Materials Provided, Needs Review. Patient stating she was told she had to eat 5 times a day. Has been making poor snack choices.   Discussed that patient can simply eat 3 balanced meals and if she feels like she needs a snack in between--would suggest low/no carb snack options (peanut butter, cheese, nuts/seeds).  Also spent some time reading food labels for carbohydrate content--patient only now looking at sugar content on labels, was not looking for total carbohydrate content.  Patient given meal and snack budgets to help her make better choices with foods.  Does dine out at [employer] buffets frequently.  Practiced meal planning with foods typically eaten.  Patient will work to incorporate more non starchy vegetables at meals to increase satiety and in hopes to prevent snacking between meals.      Medications (states correct name, dose, onset, peak, duration, side effects & timing of meds): Discussion, Demonstration, Return Demonstration, Written Materials Provided, Demonstrates  Understanding/Competency(verbalizes/demonstrates).  Most of appt spent trying to teach patient proper use of current insulin regimen (basal/bolus with Levemir and Novolog).  Also, spent time explaining how to use Relion N and Relion R and how to mix insulins correctly in one syringe.      Monitoring (monitoring blood glucose/other parameters & using results): Discussion, Written Materials Provided, Comprehends Key Points.  Due to patient's glucose levels, recommended patient send in weekly glucose logs for review so that adjustments to insulin can be made by provider.  Patient will be travelling over the next month but states her  and daughter both help her in use of [facility] to communicate with our office.      Goals  Patient has selected/evaluated goals during today's session: No     Diabetes Care Plan/Intervention  Education Plan/Intervention: Send in glucose logs weekly for review until glucose levels under better control and to determine medication adjustments once switching from basal/bolus to Relion N & Relion R insulins.  Patient taught how to use new insulin regimen and how to mix insulin in a syringe.  Patient will finish current Levemir and Novolog prior to switching to Relion N plus Relion R.      Education Units of Time   Time Spent: 45 min    Health Maintenance was reviewed today with patient. Discussed with patient importance of routine eye exams, foot exams/foot care, blood work (i.e.: A1c, microalbumin, and lipid), dental visits, yearly flu vaccine, and pneumonia vaccine as indicated by PCP. Patient verbalized understanding.     Health Maintenance Topics with due status: Not Due       Topic Last Completion Date    Foot Exam 09/12/2017    Eye Exam 11/14/2017    Lipid Panel 01/08/2018    Hemoglobin A1c 05/21/2018    Influenza Vaccine Not Due     Health Maintenance Due   Topic Date Due    TETANUS VACCINE  07/28/1970    DEXA SCAN  07/28/1992    Colonoscopy  07/28/2002    Zoster Vaccine   07/28/2012    Mammogram  04/27/2017    Pneumococcal (65+) (1 of 2 - PCV13) 07/28/2017

## 2018-06-26 ENCOUNTER — PATIENT MESSAGE (OUTPATIENT)
Dept: ENDOCRINOLOGY | Facility: CLINIC | Age: 68
End: 2018-06-26

## 2018-06-26 NOTE — PROGRESS NOTES
Patient's glucose logs reviewed by provider, Sloane London NP.  Recommended increase in Novolog base dose from 20 to 25 units plus sliding scale/correction (USING THIS SLIDING SCALE per provider: 150-200 = 3 units, 201-250 = 6 units, 251-300 = 9 units, 301-350 = 12 units, > 350 = 15 units).  Patient to continue Levemir 85 units QHS.  Called patient and discussed changes to insulin plan.  Patient still plans to switch to Relion N (40 units) + Relion R (20 units + sliding scale) before breakfast and before dinner when finishes her supply of Levemir and Novolog vials due to cost.  Patient verbalizes understanding of above insulin changes.  [facility] message to be sent to patient as well reminding to send in weekly glucose logs for review.

## 2018-07-08 ENCOUNTER — PATIENT MESSAGE (OUTPATIENT)
Dept: ENDOCRINOLOGY | Facility: CLINIC | Age: 68
End: 2018-07-08

## 2018-07-11 NOTE — TELEPHONE ENCOUNTER
----- Message from Maciej Buchanan sent at 7/11/2018  8:16 AM CDT -----  Contact: self   Type:  RX Refill Request    Who Called: patient   Refill or New Rx: refill   RX Name and Strength: hydroxychloroquine 200mg qty 180 (3 month Supply)  Preferred Pharmacy with phone number:      [facility] - [place], [place] - [address] AT [address] to [facility]  [address]  [facility] [address]  Phone: [phone] Fax: [phone]    Best Call Back Number:  [phone] (home)

## 2018-07-12 RX ORDER — HYDROXYCHLOROQUINE SULFATE 200 MG/1
200 TABLET, FILM COATED ORAL 2 TIMES DAILY
Qty: 90 TABLET | Refills: 3 | Status: SHIPPED | OUTPATIENT
Start: 2018-07-12 | End: 2018-07-12 | Stop reason: SDUPTHER

## 2018-07-12 RX ORDER — HYDROXYCHLOROQUINE SULFATE 200 MG/1
200 TABLET, FILM COATED ORAL 2 TIMES DAILY
Qty: 180 TABLET | Refills: 3 | Status: SHIPPED | OUTPATIENT
Start: 2018-07-12 | End: 2019-07-12

## 2018-07-25 ENCOUNTER — TELEPHONE (OUTPATIENT)
Dept: ENDOCRINOLOGY | Facility: CLINIC | Age: 68
End: 2018-07-25

## 2018-07-25 NOTE — TELEPHONE ENCOUNTER
----- Message from ALLAN Batista ANP-C sent at 7/24/2018  4:50 PM CDT -----  I suspect pt was put on this by psychiatry--and I would prefer her to have whoever originated it to fill it.  She is already complaining of expensive drugs, and this will likely need a substitute, so I don't want to touch it.        ----- Message -----  From: Taryn Stinson LPN  Sent: 7/24/2018   4:41 PM  To: ALLAN Batista ANP-C    Are you willing to refill her cymbalta?  Dr. Guevara's staff sent this to our dept.   ----- Message -----  From: ALLAN Batista ANP-C  Sent: 7/24/2018   4:36 PM  To: Taryn Stinson LPN    So what am I supposed to do with this?       ----- Message -----  From: Taryn Stinson LPN  Sent: 7/24/2018   4:12 PM  To: ALLAN Batista ANP-C        ----- Message -----  From: Nhi Pacheco LPN  Sent: 7/24/2018   4:09 PM  To: Surya Knott (Etienne)    Sloane original prescriber. She told us she was off at last visit due to cost. Nhi/ Rheum  ----- Message -----  From: Lori Allison  Sent: 7/24/2018   2:33 PM  To: Ismael BUSTAMANTE Staff    Type:  RX Refill Request    Who Called:  Patient  Refill or New Rx:  New  RX Name and Strength:  DULoxetine (CYMBALTA) 60 MG capsule   How is the patient currently taking it? (ex. 1XDay):  2xday  Is this a 30 day or 90 day RX:  180  Preferred Pharmacy with phone number:    [facility] - [place], [place] - [address] AT [address] to [place] [address]  [facility] [address]  Phone: [phone] Fax: [phone]  Local or Mail Order:  Mail Order  Ordering Provider:  Sloane Johnson Call Back Number:  [phone]  Additional Information:  She has changed pharmacy. Please call when completed.

## 2018-07-26 ENCOUNTER — PATIENT MESSAGE (OUTPATIENT)
Dept: ENDOCRINOLOGY | Facility: CLINIC | Age: 68
End: 2018-07-26

## 2018-07-30 ENCOUNTER — LAB VISIT (OUTPATIENT)
Dept: LAB | Facility: HOSPITAL | Age: 68
End: 2018-07-30
Attending: INTERNAL MEDICINE
Payer: MEDICARE

## 2018-07-30 DIAGNOSIS — L40.50 PSORIATIC ARTHRITIS: ICD-10-CM

## 2018-07-30 LAB
ALBUMIN SERPL BCP-MCNC: 3.4 G/DL
ALP SERPL-CCNC: 112 U/L
ALT SERPL W/O P-5'-P-CCNC: 29 U/L
ANION GAP SERPL CALC-SCNC: 11 MMOL/L
AST SERPL-CCNC: 29 U/L
BASOPHILS # BLD AUTO: 0.1 K/UL
BASOPHILS NFR BLD: 1.6 %
BILIRUB SERPL-MCNC: 0.4 MG/DL
BUN SERPL-MCNC: 16 MG/DL
CALCIUM SERPL-MCNC: 9.7 MG/DL
CHLORIDE SERPL-SCNC: 101 MMOL/L
CO2 SERPL-SCNC: 26 MMOL/L
CREAT SERPL-MCNC: 0.8 MG/DL
CRP SERPL-MCNC: 1.9 MG/L
DIFFERENTIAL METHOD: NORMAL
EOSINOPHIL # BLD AUTO: 0.2 K/UL
EOSINOPHIL NFR BLD: 3.5 %
ERYTHROCYTE [DISTWIDTH] IN BLOOD BY AUTOMATED COUNT: 13.4 %
ERYTHROCYTE [SEDIMENTATION RATE] IN BLOOD BY WESTERGREN METHOD: 17 MM/HR
EST. GFR  (AFRICAN AMERICAN): >60 ML/MIN/1.73 M^2
EST. GFR  (NON AFRICAN AMERICAN): >60 ML/MIN/1.73 M^2
GLUCOSE SERPL-MCNC: 213 MG/DL
HCT VFR BLD AUTO: 41.9 %
HGB BLD-MCNC: 13.7 G/DL
IMM GRANULOCYTES # BLD AUTO: 0.01 K/UL
IMM GRANULOCYTES NFR BLD AUTO: 0.2 %
LYMPHOCYTES # BLD AUTO: 2.2 K/UL
LYMPHOCYTES NFR BLD: 35.6 %
MCH RBC QN AUTO: 30.2 PG
MCHC RBC AUTO-ENTMCNC: 32.7 G/DL
MCV RBC AUTO: 93 FL
MONOCYTES # BLD AUTO: 0.7 K/UL
MONOCYTES NFR BLD: 11.9 %
NEUTROPHILS # BLD AUTO: 2.9 K/UL
NEUTROPHILS NFR BLD: 47.2 %
NRBC BLD-RTO: 0 /100 WBC
PLATELET # BLD AUTO: 270 K/UL
PMV BLD AUTO: 11.4 FL
POTASSIUM SERPL-SCNC: 4 MMOL/L
PROT SERPL-MCNC: 7.2 G/DL
RBC # BLD AUTO: 4.53 M/UL
SODIUM SERPL-SCNC: 138 MMOL/L
WBC # BLD AUTO: 6.07 K/UL

## 2018-07-30 PROCEDURE — 36415 COLL VENOUS BLD VENIPUNCTURE: CPT | Mod: PO

## 2018-07-30 PROCEDURE — 80053 COMPREHEN METABOLIC PANEL: CPT

## 2018-07-30 PROCEDURE — 85651 RBC SED RATE NONAUTOMATED: CPT | Mod: PO

## 2018-07-30 PROCEDURE — 85025 COMPLETE CBC W/AUTO DIFF WBC: CPT

## 2018-07-30 PROCEDURE — 86140 C-REACTIVE PROTEIN: CPT

## 2018-07-30 RX ORDER — DULOXETIN HYDROCHLORIDE 60 MG/1
CAPSULE, DELAYED RELEASE ORAL
Qty: 180 CAPSULE | Refills: 2 | Status: SHIPPED | OUTPATIENT
Start: 2018-07-30 | End: 2023-09-21

## 2018-08-15 ENCOUNTER — PATIENT MESSAGE (OUTPATIENT)
Dept: ENDOCRINOLOGY | Facility: CLINIC | Age: 68
End: 2018-08-15

## 2018-10-04 DIAGNOSIS — L40.50 PSORIATIC ARTHRITIS: ICD-10-CM

## 2018-10-04 DIAGNOSIS — M19.90 INFLAMMATORY ARTHRITIS: ICD-10-CM

## 2018-10-04 RX ORDER — LEFLUNOMIDE 10 MG/1
10 TABLET ORAL DAILY
Qty: 90 TABLET | Refills: 0 | Status: SHIPPED | OUTPATIENT
Start: 2018-10-04 | End: 2019-03-29 | Stop reason: SDUPTHER

## 2018-10-04 NOTE — TELEPHONE ENCOUNTER
----- Message from Jose Luis Santos sent at 10/4/2018  9:57 AM CDT -----  Type: Needs Medical Advice    Who Called:  Patient  Best Call Back Number: [phone]  Additional Information: Patient needs to Laflukamid tabs - [facility]

## 2018-10-15 ENCOUNTER — TELEPHONE (OUTPATIENT)
Dept: RHEUMATOLOGY | Facility: CLINIC | Age: 68
End: 2018-10-15

## 2018-10-15 NOTE — TELEPHONE ENCOUNTER
----- Message from Chase Singer sent at 10/15/2018  9:24 AM CDT -----  Type: Needs Medical Advice    Who Called:  Patient    Pharmacy name and phone #:    [facility] - [place] - [address] AT [address] to [place] [id number]  [facility] [address]  Phone: [phone] Fax: [phone]  Best Call Back Number: [phone]  Additional Information: Patient calling.  States that she called in an order forleflunomide (ARAVA) 10 MG Tab  2 weeks ago and the pharmacy doesn't have it yet

## 2018-10-29 ENCOUNTER — LAB VISIT (OUTPATIENT)
Dept: LAB | Facility: HOSPITAL | Age: 68
End: 2018-10-29
Attending: INTERNAL MEDICINE
Payer: MEDICARE

## 2018-10-29 ENCOUNTER — OFFICE VISIT (OUTPATIENT)
Dept: RHEUMATOLOGY | Facility: CLINIC | Age: 68
End: 2018-10-29
Payer: MEDICARE

## 2018-10-29 VITALS
WEIGHT: 181.44 LBS | DIASTOLIC BLOOD PRESSURE: 69 MMHG | BODY MASS INDEX: 29.16 KG/M2 | HEIGHT: 66 IN | HEART RATE: 82 BPM | SYSTOLIC BLOOD PRESSURE: 133 MMHG

## 2018-10-29 DIAGNOSIS — M79.7 FIBROMYALGIA: ICD-10-CM

## 2018-10-29 DIAGNOSIS — M19.90 INFLAMMATORY ARTHRITIS: Primary | ICD-10-CM

## 2018-10-29 DIAGNOSIS — L40.50 PSORIATIC ARTHRITIS: ICD-10-CM

## 2018-10-29 DIAGNOSIS — M19.90 OTHER TYPE OF OSTEOARTHRITIS, UNSPECIFIED SITE: ICD-10-CM

## 2018-10-29 LAB
ALBUMIN SERPL BCP-MCNC: 3.4 G/DL
ALP SERPL-CCNC: 108 U/L
ALT SERPL W/O P-5'-P-CCNC: 26 U/L
ANION GAP SERPL CALC-SCNC: 9 MMOL/L
AST SERPL-CCNC: 22 U/L
BASOPHILS # BLD AUTO: 0.1 K/UL
BASOPHILS NFR BLD: 0.9 %
BILIRUB SERPL-MCNC: 0.3 MG/DL
BUN SERPL-MCNC: 34 MG/DL
CALCIUM SERPL-MCNC: 10 MG/DL
CHLORIDE SERPL-SCNC: 103 MMOL/L
CO2 SERPL-SCNC: 28 MMOL/L
CREAT SERPL-MCNC: 0.8 MG/DL
CRP SERPL-MCNC: 22.5 MG/L
DIFFERENTIAL METHOD: ABNORMAL
EOSINOPHIL # BLD AUTO: 0.3 K/UL
EOSINOPHIL NFR BLD: 2.2 %
ERYTHROCYTE [DISTWIDTH] IN BLOOD BY AUTOMATED COUNT: 13.2 %
ERYTHROCYTE [SEDIMENTATION RATE] IN BLOOD BY WESTERGREN METHOD: 41 MM/HR
EST. GFR  (AFRICAN AMERICAN): >60 ML/MIN/1.73 M^2
EST. GFR  (NON AFRICAN AMERICAN): >60 ML/MIN/1.73 M^2
GLUCOSE SERPL-MCNC: 60 MG/DL
HCT VFR BLD AUTO: 47.3 %
HGB BLD-MCNC: 15.1 G/DL
IMM GRANULOCYTES # BLD AUTO: 0.06 K/UL
IMM GRANULOCYTES NFR BLD AUTO: 0.5 %
LYMPHOCYTES # BLD AUTO: 2.4 K/UL
LYMPHOCYTES NFR BLD: 20.5 %
MCH RBC QN AUTO: 30.2 PG
MCHC RBC AUTO-ENTMCNC: 31.9 G/DL
MCV RBC AUTO: 95 FL
MONOCYTES # BLD AUTO: 1.2 K/UL
MONOCYTES NFR BLD: 10.5 %
NEUTROPHILS # BLD AUTO: 7.5 K/UL
NEUTROPHILS NFR BLD: 65.4 %
NRBC BLD-RTO: 0 /100 WBC
PLATELET # BLD AUTO: 363 K/UL
PMV BLD AUTO: 10.6 FL
POTASSIUM SERPL-SCNC: 4.2 MMOL/L
PROT SERPL-MCNC: 8 G/DL
RBC # BLD AUTO: 5 M/UL
SODIUM SERPL-SCNC: 140 MMOL/L
WBC # BLD AUTO: 11.48 K/UL

## 2018-10-29 PROCEDURE — 99213 OFFICE O/P EST LOW 20 MIN: CPT | Mod: PBBFAC,PO | Performed by: INTERNAL MEDICINE

## 2018-10-29 PROCEDURE — 36415 COLL VENOUS BLD VENIPUNCTURE: CPT | Mod: PO

## 2018-10-29 PROCEDURE — 80053 COMPREHEN METABOLIC PANEL: CPT

## 2018-10-29 PROCEDURE — 85651 RBC SED RATE NONAUTOMATED: CPT | Mod: PO

## 2018-10-29 PROCEDURE — 99999 PR PBB SHADOW E&M-EST. PATIENT-LVL III: CPT | Mod: PBBFAC,,, | Performed by: INTERNAL MEDICINE

## 2018-10-29 PROCEDURE — 85025 COMPLETE CBC W/AUTO DIFF WBC: CPT

## 2018-10-29 PROCEDURE — 86140 C-REACTIVE PROTEIN: CPT

## 2018-10-29 PROCEDURE — 99214 OFFICE O/P EST MOD 30 MIN: CPT | Mod: S$PBB,,, | Performed by: INTERNAL MEDICINE

## 2018-10-29 RX ORDER — DULAGLUTIDE 1.5 MG/.5ML
INJECTION, SOLUTION SUBCUTANEOUS
Refills: 6 | COMMUNITY
Start: 2018-10-15

## 2018-10-29 RX ORDER — NABUMETONE 500 MG/1
500 TABLET, FILM COATED ORAL 2 TIMES DAILY
COMMUNITY
End: 2019-08-20 | Stop reason: SDUPTHER

## 2018-10-29 RX ORDER — LOSARTAN POTASSIUM 50 MG/1
TABLET ORAL
COMMUNITY
Start: 2018-07-24

## 2018-10-29 ASSESSMENT — ROUTINE ASSESSMENT OF PATIENT INDEX DATA (RAPID3)
PATIENT GLOBAL ASSESSMENT SCORE: 2.5
TOTAL RAPID3 SCORE: 2.22
PSYCHOLOGICAL DISTRESS SCORE: 6.6
PAIN SCORE: 2.5
MDHAQ FUNCTION SCORE: .5

## 2018-10-29 NOTE — PROGRESS NOTES
Subjective:          Chief Complaint: Adina Li is a 68 y.o. female who had concerns including PSA vs. EOA (4 month) and Fibromyalgia.    HPI:    Patient is a 66-year-old female here for f/u of PsA vs EOA (+hx of psoriasis but no active plaques) and fibromyalgia.    Serologies: CCP negative,  rheumatoid factor negative,   Imaging with erosive changes at the capitate and DIP.   Labs 17 no toxicity  Doing well with Arava with nearly 100% difference in joint stiffness and pain.  No ASE.     All joints hurt as of recently, hands, back, neck, knees.     Right 3rd early trigger finger-spoke Dr. Steiner. Did have injection 2 months ago. And PIP left 3rd.       HCQ 200mg QD,   Arava 10mg daily.     No significant benefit with MTX   Nabumetone 500mg BID   Gabapentin 300mg BID  Cymbalta 60mg daily       She previously  on methotrexate of 5 tabs weekly as well as Savella.She was on MTX years ago with little note of improvement.     past medical history for type 2 diabetes mellitus, hypertension, IBS, previous myocardial infarction and diverticulitis.   She has DIP pain, swelling and deformity. Some PIP, little MCP and some wrist tenderness. She has long hx of arthritis with bilateral TKA. She has hx of Lumbar DDD/DJD and cervical DDD with [facility]-Dr. Sim. Patient notes this started as a young woman 20s and 30s. She was rather active. She has some pain stiffness in shoulders. Currently right ring finger burns and stiff. Described as ache. he is taking Advil PM helps with pain and for sleep, chondroitin sulfate or move free. Recently started [place] water and feeling much better.   She is s/p CAD with stenting x 2 and sister with MI  at 45y/o. She is cautious with NSAIDs.   Patient with some dry mouth, no dry eyes, no serositis, pleurisy, ILD. ? Psoriasis scalp.  [facility] with cardiac work up no occlusive CAD.2017. Follows regularly with Cardiology  Did just have interventional pain RFA for both lumbar and  neck-Dr. Carlton.         REVIEW OF SYSTEMS:    Review of Systems   Constitutional: Negative for fever, malaise/fatigue and weight loss.   HENT: Negative for sore throat.    Eyes: Negative for double vision, photophobia and redness.   Respiratory: Negative for cough, shortness of breath and wheezing.    Cardiovascular: Negative for chest pain, palpitations and orthopnea.   Gastrointestinal: Negative for abdominal pain, constipation and diarrhea.   Genitourinary: Negative for dysuria, hematuria and urgency.   Musculoskeletal: Positive for back pain and joint pain. Negative for myalgias.   Skin: Negative for rash.   Neurological: Negative for dizziness, tingling, focal weakness and headaches.   Endo/Heme/Allergies: Does not bruise/bleed easily.   Psychiatric/Behavioral: Negative for depression, hallucinations and suicidal ideas.               Objective:            Past Medical History:   Diagnosis Date    Acid reflux     Anxiety     Arthritis     CAD (coronary artery disease)     Stant    Diabetes mellitus     Fibromyalgia     Hypertension     IBS (irritable bowel syndrome)     Myocardial infarction     Ulcerative colitis      Family History   Problem Relation Age of Onset    Colon cancer Mother     Heart attack Father     Heart attack Sister     Diabetes Sister     Breast cancer Sister      Social History     Tobacco Use    Smoking status: Former Smoker     Last attempt to quit: 2005     Years since quittin.4    Smokeless tobacco: Never Used   Substance Use Topics    Alcohol use: No     Alcohol/week: 0.0 oz    Drug use: No         Current Outpatient Medications on File Prior to Visit   Medication Sig Dispense Refill    aspirin 81 MG Chew Take 81 mg by mouth once daily.      blood sugar diagnostic Strp 1 strip by Misc.(Non-Drug; Combo Route) route 4 (four) times daily. 150 each 11    cartilage/collagen II/hyaluron (MOVE FREE ULTRA ORAL) Take 1 tablet by mouth.      co-enzyme Q-10 30 mg  capsule Take 100 mg by mouth once daily.      diphenoxylate-atropine 2.5-0.025 mg (LOMOTIL) 2.5-0.025 mg per tablet   1    dulaglutide (TRULICITY) 1.5 mg/0.5 mL PnIj Inject 1.5 mg into the skin.      DULoxetine (CYMBALTA) 60 MG capsule TK 1 C PO  capsule 2    furosemide (LASIX) 40 MG tablet Take 40 mg by mouth as needed.      gabapentin (NEURONTIN) 300 MG capsule Take 1 capsule (300 mg total) by mouth 2 (two) times daily. 180 capsule 3    glucosamine-chondroitin 500-400 mg tablet Take 1 tablet by mouth once daily.      hydrocodone-acetaminophen 5-325mg (NORCO) 5-325 mg per tablet Take 1 tablet by mouth nightly as needed for Pain.      hydroxychloroquine (PLAQUENIL) 200 mg tablet Take 1 tablet (200 mg total) by mouth 2 (two) times daily. 180 tablet 3    insulin aspart U-100 (NOVOLOG) 100 unit/mL injection Inject 14 Units into the skin 3 (three) times daily before meals. 6 vial 3    insulin aspart U-100 (NOVOLOG) 100 unit/mL injection Inject 30 Units into the skin 3 (three) times daily before meals. 2 vial 11    insulin syringe-needle U-100 (BD INSULIN SYRINGE ULT-FINE II) 1 mL 31 gauge x 5/16 Syrg 1 applicator by Misc.(Non-Drug; Combo Route) route 4 (four) times daily. 400 each 3    krill-om3-dha-epa-om6-lip-astx (KRILL OIL, OMEGA 3 AND 6,) 1,500-165-67.5 mg Cap Take by mouth once daily.      lancets 31 gauge Misc 1 lancet by Misc.(Non-Drug; Combo Route) route 4 (four) times daily. 200 each 4    leflunomide (ARAVA) 10 MG Tab Take 1 tablet (10 mg total) by mouth once daily. 90 tablet 0    LEVEMIR U-100 INSULIN 100 unit/mL injection INJECT 75 UNITS INTO THE SKIN EVERY EVENING. 10 mL 1    levothyroxine (SYNTHROID) 88 MCG tablet       losartan (COZAAR) 50 MG tablet losartan 50 mg tablet      metoprolol succinate (TOPROL-XL) 25 MG 24 hr tablet Take 50 mg by mouth once daily.      metoprolol succinate (TOPROL-XL) 50 MG 24 hr tablet       mirtazapine (REMERON) 15 MG tablet Take 15 mg by mouth.    "   multivitamin (THERAGRAN) per tablet Take 1 tablet by mouth once daily.      nitroGLYCERIN (NITROSTAT) 0.4 MG SL tablet PLACE ONE TABLET UNDER THE TONGUE EVERY 5 MINUTES as needed for chest pain  1    pen needle, diabetic (BD ULTRA-FINE ALEX PEN NEEDLE) 32 gauge x 5/32" Ndle       pravastatin (PRAVACHOL) 20 MG tablet Take 20 mg by mouth once daily.      TRULICITY 1.5 mg/0.5 mL PnIj INJECT ONE pen SUBCUTANEOUSLY once a week  6    [DISCONTINUED] b complex vitamins capsule Take 1 capsule by mouth once daily.      [DISCONTINUED] cinnamon bark (CINNAMON) 500 mg capsule Take 500 mg by mouth once daily.      [DISCONTINUED] cranberry 500 mg Cap Take 500 mg by mouth once daily.      [DISCONTINUED] CYANOCOBALAMIN, VITAMIN B-12, ORAL Take 1,000 mg by mouth once daily.      [DISCONTINUED] ferrous sulfate 325 mg (65 mg iron) Tab tablet Take 325 mg by mouth once daily.      [DISCONTINUED] folic acid (FOLVITE) 1 MG tablet Take 1 tablet (1 mg total) by mouth once daily. 30 tablet 5    [DISCONTINUED] magnesium 30 mg Tab Take by mouth once.      [DISCONTINUED] metFORMIN (GLUCOPHAGE-XR) 500 MG 24 hr tablet Take 2 tablets (1,000 mg total) by mouth 2 (two) times daily with meals. 120 tablet 6    [DISCONTINUED] nabumetone (RELAFEN) 500 MG tablet Take 500 mg by mouth once daily.      [DISCONTINUED] nabumetone (RELAFEN) 500 MG tablet TAKE 1 TABLET BY MOUTH TWICE DAILY      [DISCONTINUED] niacin 500 MG Tab Take 100 mg by mouth every evening.      [DISCONTINUED] solifenacin (VESICARE) 10 MG tablet Take 10 mg by mouth every evening.       [DISCONTINUED] valsartan (DIOVAN) 80 MG tablet Take 80 mg by mouth once daily.      [DISCONTINUED] valsartan (DIOVAN) 80 MG tablet Take 80 mg by mouth.      [DISCONTINUED] vitamin D 1000 units Tab Take 185 mg by mouth once daily.       No current facility-administered medications on file prior to visit.        Vitals:    10/29/18 1524   BP: 133/69   Pulse: 82       Physical " Exam:    Physical Exam   Constitutional: She appears well-developed and well-nourished.   HENT:   Nose: No septal deviation.   Mouth/Throat: Mucous membranes are normal. No oral lesions.   Eyes: Pupils are equal, round, and reactive to light. Right conjunctiva is not injected. Left conjunctiva is not injected.   Neck: No JVD present. No thyroid mass and no thyromegaly present.   Cardiovascular: Normal rate, regular rhythm and normal pulses.   No edema   Pulmonary/Chest: Effort normal and breath sounds normal.   Abdominal: Soft. Normal appearance. There is no hepatosplenomegaly.   Musculoskeletal:        Right shoulder: She exhibits normal range of motion, no tenderness and no swelling.        Left shoulder: She exhibits normal range of motion, no tenderness and no swelling.        Right elbow: She exhibits normal range of motion and no swelling. No tenderness found.        Left elbow: She exhibits normal range of motion and no swelling. No tenderness found.        Right wrist: She exhibits normal range of motion, no tenderness and no swelling.        Left wrist: She exhibits normal range of motion, no tenderness and no swelling.        Right hip: She exhibits normal range of motion, normal strength and no swelling.        Left hip: She exhibits normal range of motion, no tenderness and no swelling.        Right knee: She exhibits normal range of motion and no swelling. No tenderness found.        Left knee: She exhibits normal range of motion and no swelling. No tenderness found.        Right ankle: She exhibits normal range of motion and no swelling. No tenderness.        Left ankle: She exhibits normal range of motion and no swelling. No tenderness.        Right hand: She exhibits decreased range of motion, tenderness and deformity.        Left hand: She exhibits decreased range of motion, tenderness and deformity.        Left foot: There is tenderness and swelling.   Patient is a DIP bony hypertrophy with  deformity particular the second DIP bilaterally.  She has slight erythema at the third fourth and fifth right DIP she's difficulty with finger curl  strength reduced . no active synovitis in the wrist MCPs or PIPs.    Midfoot dorsal extensor tendons slight swelling and tenderness   Lymphadenopathy:     She has no cervical adenopathy.     She has no axillary adenopathy.   Neurological: She has normal strength and normal reflexes.   Skin: Skin is dry and intact.   Psychiatric: She has a normal mood and affect.             Assessment:       Encounter Diagnoses   Name Primary?    Inflammatory arthritis Yes    Fibromyalgia     Other type of osteoarthritis, unspecified site           Plan:        Inflammatory arthritis    Fibromyalgia    Other type of osteoarthritis, unspecified site          Nabumetone 500mg twice daily is ok for now be advised this is an NSAID  Continue Leflunomide 10mg daily-doing great with this. \  Labs every 3 months   Hydroxychloroquine 200mg SAVAGE    Duloxetine (Cymbalta) 60mg daily due to insurance cost has been off.   Continue Gabapentin 300mg BID    Had shingles vaccine 9/2018      Follow-up in about 4 months (around 2/28/2019).          30min consultation with greater than 50% spent in counseling, chart review and coordination of care. All questions answered. Patient medication list with duplications of NSAIDs and confusion over medications which I have clarified each and every medication today.

## 2018-12-09 ENCOUNTER — PATIENT MESSAGE (OUTPATIENT)
Dept: ENDOCRINOLOGY | Facility: CLINIC | Age: 68
End: 2018-12-09

## 2019-02-04 ENCOUNTER — TELEPHONE (OUTPATIENT)
Dept: RHEUMATOLOGY | Facility: CLINIC | Age: 69
End: 2019-02-04

## 2019-02-04 DIAGNOSIS — M19.90 INFLAMMATORY ARTHRITIS: Primary | ICD-10-CM

## 2019-02-04 NOTE — TELEPHONE ENCOUNTER
----- Message from Beto Lux sent at 2/4/2019 10:45 AM CST -----  Contact: self   Patient need orders for blood work, any questions please call back at [phone] (home)     Spoke to patient and booked labs in [place] in preparation for next office visit. CG

## 2019-03-13 ENCOUNTER — LAB VISIT (OUTPATIENT)
Dept: LAB | Facility: HOSPITAL | Age: 69
End: 2019-03-13
Attending: INTERNAL MEDICINE
Payer: MEDICARE

## 2019-03-13 DIAGNOSIS — M19.90 INFLAMMATORY ARTHRITIS: ICD-10-CM

## 2019-03-13 LAB — ERYTHROCYTE [SEDIMENTATION RATE] IN BLOOD BY WESTERGREN METHOD: 38 MM/HR

## 2019-03-13 PROCEDURE — 86140 C-REACTIVE PROTEIN: CPT

## 2019-03-13 PROCEDURE — 80053 COMPREHEN METABOLIC PANEL: CPT

## 2019-03-13 PROCEDURE — 85025 COMPLETE CBC W/AUTO DIFF WBC: CPT

## 2019-03-13 PROCEDURE — 36415 COLL VENOUS BLD VENIPUNCTURE: CPT | Mod: PO

## 2019-03-13 PROCEDURE — 85651 RBC SED RATE NONAUTOMATED: CPT | Mod: PO

## 2019-03-14 LAB
ALBUMIN SERPL BCP-MCNC: 3.5 G/DL
ALP SERPL-CCNC: 105 U/L
ALT SERPL W/O P-5'-P-CCNC: 22 U/L
ANION GAP SERPL CALC-SCNC: 11 MMOL/L
AST SERPL-CCNC: 26 U/L
BASOPHILS # BLD AUTO: 0.07 K/UL
BASOPHILS NFR BLD: 1.3 %
BILIRUB SERPL-MCNC: 0.6 MG/DL
BUN SERPL-MCNC: 22 MG/DL
CALCIUM SERPL-MCNC: 10 MG/DL
CHLORIDE SERPL-SCNC: 100 MMOL/L
CO2 SERPL-SCNC: 29 MMOL/L
CREAT SERPL-MCNC: 0.8 MG/DL
CRP SERPL-MCNC: 8.5 MG/L
DIFFERENTIAL METHOD: NORMAL
EOSINOPHIL # BLD AUTO: 0.2 K/UL
EOSINOPHIL NFR BLD: 2.9 %
ERYTHROCYTE [DISTWIDTH] IN BLOOD BY AUTOMATED COUNT: 13.3 %
EST. GFR  (AFRICAN AMERICAN): >60 ML/MIN/1.73 M^2
EST. GFR  (NON AFRICAN AMERICAN): >60 ML/MIN/1.73 M^2
GLUCOSE SERPL-MCNC: 140 MG/DL
HCT VFR BLD AUTO: 45.4 %
HGB BLD-MCNC: 15 G/DL
IMM GRANULOCYTES # BLD AUTO: 0.02 K/UL
IMM GRANULOCYTES NFR BLD AUTO: 0.4 %
LYMPHOCYTES # BLD AUTO: 1.9 K/UL
LYMPHOCYTES NFR BLD: 35.3 %
MCH RBC QN AUTO: 30.1 PG
MCHC RBC AUTO-ENTMCNC: 33 G/DL
MCV RBC AUTO: 91 FL
MONOCYTES # BLD AUTO: 0.8 K/UL
MONOCYTES NFR BLD: 14 %
NEUTROPHILS # BLD AUTO: 2.5 K/UL
NEUTROPHILS NFR BLD: 46.1 %
NRBC BLD-RTO: 0 /100 WBC
PLATELET # BLD AUTO: 307 K/UL
PMV BLD AUTO: 10.9 FL
POTASSIUM SERPL-SCNC: 4.3 MMOL/L
PROT SERPL-MCNC: 7.7 G/DL
RBC # BLD AUTO: 4.99 M/UL
SODIUM SERPL-SCNC: 140 MMOL/L
WBC # BLD AUTO: 5.44 K/UL

## 2019-03-25 ENCOUNTER — OFFICE VISIT (OUTPATIENT)
Dept: RHEUMATOLOGY | Facility: CLINIC | Age: 69
End: 2019-03-25
Payer: MEDICARE

## 2019-03-25 VITALS
DIASTOLIC BLOOD PRESSURE: 67 MMHG | HEART RATE: 79 BPM | SYSTOLIC BLOOD PRESSURE: 114 MMHG | BODY MASS INDEX: 29.32 KG/M2 | WEIGHT: 176 LBS | HEIGHT: 65 IN

## 2019-03-25 DIAGNOSIS — M79.7 FIBROMYALGIA: ICD-10-CM

## 2019-03-25 DIAGNOSIS — M19.90 INFLAMMATORY ARTHRITIS: Primary | ICD-10-CM

## 2019-03-25 PROCEDURE — 99999 PR PBB SHADOW E&M-EST. PATIENT-LVL III: ICD-10-PCS | Mod: PBBFAC,,, | Performed by: INTERNAL MEDICINE

## 2019-03-25 PROCEDURE — 99999 PR PBB SHADOW E&M-EST. PATIENT-LVL III: CPT | Mod: PBBFAC,,, | Performed by: INTERNAL MEDICINE

## 2019-03-25 PROCEDURE — 99213 OFFICE O/P EST LOW 20 MIN: CPT | Mod: PBBFAC,PO | Performed by: INTERNAL MEDICINE

## 2019-03-25 PROCEDURE — 99214 PR OFFICE/OUTPT VISIT, EST, LEVL IV, 30-39 MIN: ICD-10-PCS | Mod: S$PBB,,, | Performed by: INTERNAL MEDICINE

## 2019-03-25 PROCEDURE — 99214 OFFICE O/P EST MOD 30 MIN: CPT | Mod: S$PBB,,, | Performed by: INTERNAL MEDICINE

## 2019-03-25 NOTE — PROGRESS NOTES
Subjective:          Chief Complaint: Adina Li is a 68 y.o. female who had concerns including Disease Management and Psoriatic Arthritis.    HPI:    Patient is a 68-year-old female here for f/u of PsA vs EOA (+hx of psoriasis but no active plaques) and fibromyalgia.    Serologies: CCP negative,  rheumatoid factor negative,   Imaging with erosive changes at the capitate and DIP.       Labs 17 no toxicity  Was doing well with Arava -All joints hurt as of recently, hands, back, neck, knees. Same joints with same pain but slightly worse this winter.   Right shoulder painful few years ago with rotator cuff years ago.     Right 3rd early trigger finger-spoke Dr. Steiner. Did have injection 2 months ago. And PIP left 3rd.  Very painful in past. And now triggering again  She did have fall recently: left ischial bursitis. No radiation.      HCQ 200mg QD,   Arava 10mg daily.     No significant benefit with MTX   Nabumetone 500mg BID   Gabapentin 300mg BID  Cymbalta 60mg daily       She previously  on methotrexate of 5 tabs weekly as well as Savella.She was on MTX years ago with little note of improvement.     past medical history for type 2 diabetes mellitus, hypertension, IBS, previous myocardial infarction and diverticulitis.   She has DIP pain, swelling and deformity. Some PIP, little MCP and some wrist tenderness. She has long hx of arthritis with bilateral TKA. She has hx of Lumbar DDD/DJD and cervical DDD with [facility]-Dr. Sim. Patient notes this started as a young woman 20s and 30s. She was rather active. She has some pain stiffness in shoulders. Currently right ring finger burns and stiff. Described as ache. he is taking Advil PM helps with pain and for sleep, chondroitin sulfate or move free. Recently started [place] water and feeling much better.   She is s/p CAD with stenting x 2 and sister with MI  at 45y/o. She is cautious with NSAIDs.   Patient with some dry mouth, no dry eyes, no serositis,  pleurisy, ILD. ? Psoriasis scalp.  [facility] with cardiac work up no occlusive CAD.2017. Follows regularly with Cardiology  Did just have interventional pain RFA for both lumbar and neck-Dr. Carlton.         REVIEW OF SYSTEMS:    Review of Systems   Constitutional: Negative for fever, malaise/fatigue and weight loss.   HENT: Negative for sore throat.    Eyes: Negative for double vision, photophobia and redness.   Respiratory: Negative for cough, shortness of breath and wheezing.    Cardiovascular: Negative for chest pain, palpitations and orthopnea.   Gastrointestinal: Negative for abdominal pain, constipation and diarrhea.   Genitourinary: Negative for dysuria, hematuria and urgency.   Musculoskeletal: Positive for back pain and joint pain. Negative for myalgias.   Skin: Negative for rash.   Neurological: Negative for dizziness, tingling, focal weakness and headaches.   Endo/Heme/Allergies: Does not bruise/bleed easily.   Psychiatric/Behavioral: Negative for depression, hallucinations and suicidal ideas.               Objective:            Past Medical History:   Diagnosis Date    Acid reflux     Anxiety     Arthritis     CAD (coronary artery disease)     Stant    Diabetes mellitus     Fibromyalgia     Hypertension     IBS (irritable bowel syndrome)     Myocardial infarction     Ulcerative colitis      Family History   Problem Relation Age of Onset    Colon cancer Mother     Heart attack Father     Heart attack Sister     Diabetes Sister     Breast cancer Sister      Social History     Tobacco Use    Smoking status: Former Smoker     Last attempt to quit: 2005     Years since quittin.8    Smokeless tobacco: Never Used   Substance Use Topics    Alcohol use: No     Alcohol/week: 0.0 oz    Drug use: No         Current Outpatient Medications on File Prior to Visit   Medication Sig Dispense Refill    aspirin 81 MG Chew Take 81 mg by mouth once daily.      blood sugar diagnostic Strp 1  strip by Misc.(Non-Drug; Combo Route) route 4 (four) times daily. 150 each 11    cartilage/collagen II/hyaluron (MOVE FREE ULTRA ORAL) Take 1 tablet by mouth.      co-enzyme Q-10 30 mg capsule Take 100 mg by mouth once daily.      diphenoxylate-atropine 2.5-0.025 mg (LOMOTIL) 2.5-0.025 mg per tablet   1    dulaglutide (TRULICITY) 1.5 mg/0.5 mL PnIj Inject 1.5 mg into the skin.      DULoxetine (CYMBALTA) 60 MG capsule TK 1 C PO  capsule 2    furosemide (LASIX) 40 MG tablet Take 40 mg by mouth as needed.      gabapentin (NEURONTIN) 300 MG capsule Take 1 capsule (300 mg total) by mouth 2 (two) times daily. 180 capsule 3    glucosamine-chondroitin 500-400 mg tablet Take 1 tablet by mouth once daily.      hydrocodone-acetaminophen 5-325mg (NORCO) 5-325 mg per tablet Take 1 tablet by mouth nightly as needed for Pain.      hydroxychloroquine (PLAQUENIL) 200 mg tablet Take 1 tablet (200 mg total) by mouth 2 (two) times daily. 180 tablet 3    insulin aspart U-100 (NOVOLOG) 100 unit/mL injection Inject 14 Units into the skin 3 (three) times daily before meals. 6 vial 3    insulin syringe-needle U-100 (BD INSULIN SYRINGE ULT-FINE II) 1 mL 31 gauge x 5/16 Syrg 1 applicator by Misc.(Non-Drug; Combo Route) route 4 (four) times daily. 400 each 3    krill-om3-dha-epa-om6-lip-astx (KRILL OIL, OMEGA 3 AND 6,) 1,500-165-67.5 mg Cap Take by mouth once daily.      lancets 31 gauge Misc 1 lancet by Misc.(Non-Drug; Combo Route) route 4 (four) times daily. 200 each 4    leflunomide (ARAVA) 10 MG Tab Take 1 tablet (10 mg total) by mouth once daily. 90 tablet 0    LEVEMIR U-100 INSULIN 100 unit/mL injection INJECT 75 UNITS INTO THE SKIN EVERY EVENING. 10 mL 1    levothyroxine (SYNTHROID) 88 MCG tablet       losartan (COZAAR) 50 MG tablet losartan 50 mg tablet      metoprolol succinate (TOPROL-XL) 50 MG 24 hr tablet       multivitamin (THERAGRAN) per tablet Take 1 tablet by mouth once daily.      nabumetone  "(RELAFEN) 500 MG tablet Take 500 mg by mouth 2 (two) times daily.      nitroGLYCERIN (NITROSTAT) 0.4 MG SL tablet PLACE ONE TABLET UNDER THE TONGUE EVERY 5 MINUTES as needed for chest pain  1    pen needle, diabetic (BD ULTRA-FINE ALEX PEN NEEDLE) 32 gauge x 5/32" Ndle       pravastatin (PRAVACHOL) 20 MG tablet Take 20 mg by mouth once daily.      TRULICITY 1.5 mg/0.5 mL PnIj INJECT ONE pen SUBCUTANEOUSLY once a week  6    mirtazapine (REMERON) 15 MG tablet Take 15 mg by mouth.      [DISCONTINUED] insulin aspart U-100 (NOVOLOG) 100 unit/mL injection Inject 30 Units into the skin 3 (three) times daily before meals. 2 vial 11    [DISCONTINUED] metoprolol succinate (TOPROL-XL) 25 MG 24 hr tablet Take 50 mg by mouth once daily.       No current facility-administered medications on file prior to visit.        Vitals:    03/25/19 1624   BP: 114/67   Pulse: 79       Physical Exam:    Physical Exam   Constitutional: She appears well-developed and well-nourished.   HENT:   Nose: No septal deviation.   Mouth/Throat: Mucous membranes are normal. No oral lesions.   Eyes: Pupils are equal, round, and reactive to light. Right conjunctiva is not injected. Left conjunctiva is not injected.   Neck: No JVD present. No thyroid mass and no thyromegaly present.   Cardiovascular: Normal rate, regular rhythm and normal pulses.   No edema   Pulmonary/Chest: Effort normal and breath sounds normal.   Abdominal: Soft. Normal appearance. There is no hepatosplenomegaly.   Musculoskeletal:        Right shoulder: She exhibits normal range of motion, no tenderness and no swelling.        Left shoulder: She exhibits normal range of motion, no tenderness and no swelling.        Right elbow: She exhibits normal range of motion and no swelling. No tenderness found.        Left elbow: She exhibits normal range of motion and no swelling. No tenderness found.        Right wrist: She exhibits normal range of motion, no tenderness and no swelling. "        Left wrist: She exhibits normal range of motion, no tenderness and no swelling.        Right hip: She exhibits normal range of motion, normal strength and no swelling.        Left hip: She exhibits normal range of motion, no tenderness and no swelling.        Right knee: She exhibits normal range of motion and no swelling. No tenderness found.        Left knee: She exhibits normal range of motion and no swelling. No tenderness found.        Right ankle: She exhibits normal range of motion and no swelling. No tenderness.        Left ankle: She exhibits normal range of motion and no swelling. No tenderness.        Right hand: She exhibits decreased range of motion, tenderness and deformity.        Left hand: She exhibits decreased range of motion, tenderness and deformity.        Left foot: There is tenderness and swelling.   Patient is a DIP bony hypertrophy with deformity particular the second DIP bilaterally.  She has slight erythema at the third fourth and fifth right DIP she's difficulty with finger curl  strength reduced . no active synovitis in the wrist MCPs or PIPs.    +++tenderness at the left ischial bursa.    Lymphadenopathy:     She has no cervical adenopathy.     She has no axillary adenopathy.   Neurological: She has normal strength and normal reflexes.   Skin: Skin is dry and intact.   Psychiatric: She has a normal mood and affect.             Assessment:       Encounter Diagnoses   Name Primary?    Inflammatory arthritis Yes    Fibromyalgia           Plan:        Inflammatory arthritis  -     CBC auto differential; Standing; Expected date: 03/25/2019  -     Comprehensive metabolic panel; Standing; Expected date: 03/25/2019  -     Sedimentation rate; Standing; Expected date: 03/25/2019  -     C-reactive protein; Standing; Expected date: 03/25/2019    Fibromyalgia      Nabumetone 500mg twice daily is ok for now be advised this is an NSAID  Continue Leflunomide 10mg daily-doing well with  this.      Hydroxychloroquine 200mg SAVAGE    Duloxetine (Cymbalta) 60mg daily due to insurance cost has been off.   Continue Gabapentin 300mg BID  Labs every 3 months  Had shingles vaccine 9/2018    Right 4th flexor: trigger finger painful    Left ischia bursitis-if niot improvin in the next 4-6 weeks.     Follow up in about 4 months (around 7/25/2019).          30min consultation with greater than 50% spent in counseling, chart review and coordination of care. All questions answered. Patient medication list with duplications of NSAIDs and confusion over medications which I have clarified each and every medication today.

## 2019-03-29 DIAGNOSIS — M19.90 INFLAMMATORY ARTHRITIS: ICD-10-CM

## 2019-03-29 DIAGNOSIS — L40.50 PSORIATIC ARTHRITIS: ICD-10-CM

## 2019-04-01 RX ORDER — LEFLUNOMIDE 10 MG/1
TABLET ORAL
Qty: 90 TABLET | Refills: 0 | Status: SHIPPED | OUTPATIENT
Start: 2019-04-01 | End: 2020-01-13 | Stop reason: SDUPTHER

## 2019-08-19 ENCOUNTER — TELEPHONE (OUTPATIENT)
Dept: RHEUMATOLOGY | Facility: CLINIC | Age: 69
End: 2019-08-19

## 2019-08-20 ENCOUNTER — OFFICE VISIT (OUTPATIENT)
Dept: RHEUMATOLOGY | Facility: CLINIC | Age: 69
End: 2019-08-20
Payer: MEDICARE

## 2019-08-20 VITALS
WEIGHT: 179.13 LBS | BODY MASS INDEX: 29.84 KG/M2 | DIASTOLIC BLOOD PRESSURE: 71 MMHG | HEIGHT: 65 IN | HEART RATE: 82 BPM | SYSTOLIC BLOOD PRESSURE: 131 MMHG

## 2019-08-20 DIAGNOSIS — M19.90 INFLAMMATORY ARTHRITIS: Primary | ICD-10-CM

## 2019-08-20 DIAGNOSIS — M79.7 FIBROMYALGIA: ICD-10-CM

## 2019-08-20 PROCEDURE — 99999 PR PBB SHADOW E&M-EST. PATIENT-LVL III: ICD-10-PCS | Mod: PBBFAC,,, | Performed by: INTERNAL MEDICINE

## 2019-08-20 PROCEDURE — 99215 PR OFFICE/OUTPT VISIT, EST, LEVL V, 40-54 MIN: ICD-10-PCS | Mod: S$PBB,,, | Performed by: INTERNAL MEDICINE

## 2019-08-20 PROCEDURE — 99999 PR PBB SHADOW E&M-EST. PATIENT-LVL III: CPT | Mod: PBBFAC,,, | Performed by: INTERNAL MEDICINE

## 2019-08-20 PROCEDURE — 99215 OFFICE O/P EST HI 40 MIN: CPT | Mod: S$PBB,,, | Performed by: INTERNAL MEDICINE

## 2019-08-20 PROCEDURE — 99213 OFFICE O/P EST LOW 20 MIN: CPT | Mod: PBBFAC,PO | Performed by: INTERNAL MEDICINE

## 2019-08-20 RX ORDER — HYDROXYCHLOROQUINE SULFATE 200 MG/1
200 TABLET, FILM COATED ORAL 2 TIMES DAILY
Qty: 60 TABLET | Refills: 6 | Status: SHIPPED | OUTPATIENT
Start: 2019-08-20 | End: 2019-10-10 | Stop reason: SDUPTHER

## 2019-08-20 RX ORDER — NABUMETONE 500 MG/1
500 TABLET, FILM COATED ORAL 2 TIMES DAILY
Qty: 60 TABLET | Refills: 4 | Status: SHIPPED | OUTPATIENT
Start: 2019-08-20 | End: 2020-08-19

## 2019-08-20 NOTE — PROGRESS NOTES
Subjective:          Chief Complaint: Adina Li is a 69 y.o. female who had concerns including Disease Management.    HPI:    Patient is a 68-year-old female here for f/u of PsA vs EOA (+hx of psoriasis but no active plaques) and fibromyalgia.    Serologies: CCP negative,  rheumatoid factor negative,   Imaging with erosive changes at the capitate and DIP.  Patient with recent admission and hepatic abscess (multiple) with sepsis strep species. Unclear source of infection. But did have RFA 1-2 weeks prior to becoming ill. Patient immunosuppressed modestly with Arava at 10mg daily.   Completed ABX outpatient.   CT 2019 on f/u with marked improve of live abscess.   Plans with Dr. Nelson to repeat CT in 4-6 months  I rec holding Arava completely if she can tolerate until complete CT resolution of abscess.   Noting burning and stinging in her joints. Pains and stiffness.     Current regimen:    HCQ 200mg BID  Arava 10mg daily. (off only for 1 week, never stopped while in hospital or during out patient IV)    No significant benefit with MTX   Nabumetone 500mg BID     Now receiving from Psych:   Gabapentin 300mg BID  Cymbalta 60mg BID      past medical history for type 2 diabetes mellitus, hypertension, IBS, previous myocardial infarction and diverticulitis.   She has DIP pain, swelling and deformity. Some PIP, little MCP and some wrist tenderness. She has long hx of arthritis with bilateral TKA. She has hx of Lumbar DDD/DJD and cervical DDD with [facility]-Dr. Sim. Patient notes this started as a young woman 20s and 30s. She was rather active. She has some pain stiffness in shoulders. Currently right ring finger burns and stiff. Described as ache. he is taking Advil PM helps with pain and for sleep, chondroitin sulfate or move free. Recently started [place] water and feeling much better.   She is s/p CAD with stenting x 2 and sister with MI  at 45y/o.    Patient with some dry mouth, no dry eyes, no serositis,  pleurisy, ILD. ? Psoriasis scalp.  [facility] with cardiac work up no occlusive CAD.2017. Follows regularly with Cardiology  Did just have interventional pain RFA for both lumbar and neck-Dr. Carlton.       REVIEW OF SYSTEMS:    Review of Systems   Constitutional: Negative for fever, malaise/fatigue and weight loss.   HENT: Negative for sore throat.    Eyes: Negative for double vision, photophobia and redness.   Respiratory: Negative for cough, shortness of breath and wheezing.    Cardiovascular: Negative for chest pain, palpitations and orthopnea.   Gastrointestinal: Negative for abdominal pain, constipation and diarrhea.   Genitourinary: Negative for dysuria, hematuria and urgency.   Musculoskeletal: Positive for back pain and joint pain. Negative for myalgias.   Skin: Negative for rash.   Neurological: Negative for dizziness, tingling, focal weakness and headaches.   Endo/Heme/Allergies: Does not bruise/bleed easily.   Psychiatric/Behavioral: Negative for depression, hallucinations and suicidal ideas.               Objective:            Past Medical History:   Diagnosis Date    Acid reflux     Anxiety     Arthritis     CAD (coronary artery disease)     Stant    Diabetes mellitus     Fibromyalgia     Hypertension     IBS (irritable bowel syndrome)     Myocardial infarction     Ulcerative colitis      Family History   Problem Relation Age of Onset    Colon cancer Mother     Heart attack Father     Heart attack Sister     Diabetes Sister     Breast cancer Sister      Social History     Tobacco Use    Smoking status: Former Smoker     Last attempt to quit: 2005     Years since quittin.2    Smokeless tobacco: Never Used   Substance Use Topics    Alcohol use: No     Alcohol/week: 0.0 oz    Drug use: No         Current Outpatient Medications on File Prior to Visit   Medication Sig Dispense Refill    aspirin 81 MG Chew Take 81 mg by mouth once daily.      blood sugar diagnostic Strp 1 strip  by Misc.(Non-Drug; Combo Route) route 4 (four) times daily. 150 each 11    cartilage/collagen II/hyaluron (MOVE FREE ULTRA ORAL) Take 1 tablet by mouth.      co-enzyme Q-10 30 mg capsule Take 100 mg by mouth once daily.      diphenoxylate-atropine 2.5-0.025 mg (LOMOTIL) 2.5-0.025 mg per tablet   1    dulaglutide (TRULICITY) 1.5 mg/0.5 mL PnIj Inject 1.5 mg into the skin.      DULoxetine (CYMBALTA) 60 MG capsule TK 1 C PO  capsule 2    furosemide (LASIX) 40 MG tablet Take 40 mg by mouth as needed.      glucosamine-chondroitin 500-400 mg tablet Take 1 tablet by mouth once daily.      hydrocodone-acetaminophen 5-325mg (NORCO) 5-325 mg per tablet Take 1 tablet by mouth nightly as needed for Pain.      insulin aspart U-100 (NOVOLOG) 100 unit/mL injection Inject 14 Units into the skin 3 (three) times daily before meals. 6 vial 3    insulin syringe-needle U-100 (BD INSULIN SYRINGE ULT-FINE II) 1 mL 31 gauge x 5/16 Syrg 1 applicator by Misc.(Non-Drug; Combo Route) route 4 (four) times daily. 400 each 3    krill-om3-dha-epa-om6-lip-astx (KRILL OIL, OMEGA 3 AND 6,) 1,500-165-67.5 mg Cap Take by mouth once daily.      lancets 31 gauge Misc 1 lancet by Misc.(Non-Drug; Combo Route) route 4 (four) times daily. 200 each 4    leflunomide (ARAVA) 10 MG Tab TAKE 1 TABLET ONCE DAILY 90 tablet 0    LEVEMIR U-100 INSULIN 100 unit/mL injection INJECT 75 UNITS INTO THE SKIN EVERY EVENING. 10 mL 1    levothyroxine (SYNTHROID) 88 MCG tablet       losartan (COZAAR) 50 MG tablet losartan 50 mg tablet      metoprolol succinate (TOPROL-XL) 50 MG 24 hr tablet       mirtazapine (REMERON) 15 MG tablet Take 15 mg by mouth.      multivitamin (THERAGRAN) per tablet Take 1 tablet by mouth once daily.      nabumetone (RELAFEN) 500 MG tablet Take 500 mg by mouth 2 (two) times daily.      nitroGLYCERIN (NITROSTAT) 0.4 MG SL tablet PLACE ONE TABLET UNDER THE TONGUE EVERY 5 MINUTES as needed for chest pain  1    pen needle,  "diabetic (BD ULTRA-FINE ALEX PEN NEEDLE) 32 gauge x 5/32" Ndle       pravastatin (PRAVACHOL) 20 MG tablet Take 20 mg by mouth once daily.      TRULICITY 1.5 mg/0.5 mL PnIj INJECT ONE pen SUBCUTANEOUSLY once a week  6    gabapentin (NEURONTIN) 300 MG capsule Take 1 capsule (300 mg total) by mouth 2 (two) times daily. 180 capsule 3     No current facility-administered medications on file prior to visit.        Vitals:    08/20/19 1619   BP: 131/71   Pulse: 82       Physical Exam:    Physical Exam   Constitutional: She appears well-developed and well-nourished.   HENT:   Nose: No septal deviation.   Mouth/Throat: Mucous membranes are normal. No oral lesions.   Eyes: Pupils are equal, round, and reactive to light. Right conjunctiva is not injected. Left conjunctiva is not injected.   Neck: No JVD present. No thyroid mass and no thyromegaly present.   Cardiovascular: Normal rate, regular rhythm and normal pulses.   No edema   Pulmonary/Chest: Effort normal and breath sounds normal.   Abdominal: Soft. Normal appearance. There is no hepatosplenomegaly.   Musculoskeletal:        Right shoulder: She exhibits normal range of motion, no tenderness and no swelling.        Left shoulder: She exhibits normal range of motion, no tenderness and no swelling.        Right elbow: She exhibits normal range of motion and no swelling. No tenderness found.        Left elbow: She exhibits normal range of motion and no swelling. No tenderness found.        Right wrist: She exhibits normal range of motion, no tenderness and no swelling.        Left wrist: She exhibits normal range of motion, no tenderness and no swelling.        Right hip: She exhibits normal range of motion, normal strength and no swelling.        Left hip: She exhibits normal range of motion, no tenderness and no swelling.        Right knee: She exhibits normal range of motion and no swelling. No tenderness found.        Left knee: She exhibits normal range of motion " and no swelling. No tenderness found.        Right ankle: She exhibits normal range of motion and no swelling. No tenderness.        Left ankle: She exhibits normal range of motion and no swelling. No tenderness.        Right hand: She exhibits decreased range of motion, tenderness and deformity.        Left hand: She exhibits decreased range of motion, tenderness and deformity.        Left foot: There is tenderness and swelling.   Patient is a DIP bony hypertrophy with deformity particular the second DIP bilaterally.  She has slight erythema at the third fourth and fifth right DIP she's difficulty with finger curl  strength reduced . no active synovitis in the wrist MCPs or PIPs.    +++tenderness at the left ischial bursa.    Lymphadenopathy:     She has no cervical adenopathy.     She has no axillary adenopathy.   Neurological: She has normal strength and normal reflexes.   Skin: Skin is dry and intact.   Psychiatric: She has a normal mood and affect.             Assessment:       Encounter Diagnoses   Name Primary?    Inflammatory arthritis Yes    Fibromyalgia           Plan:        Inflammatory arthritis  Comments:  EOA.     Fibromyalgia    Other orders  -     nabumetone (RELAFEN) 500 MG tablet; Take 1 tablet (500 mg total) by mouth 2 (two) times daily.  Dispense: 60 tablet; Refill: 4  -     hydroxychloroquine (PLAQUENIL) 200 mg tablet; Take 1 tablet (200 mg total) by mouth 2 (two) times daily.  Dispense: 60 tablet; Refill: 6      Nabumetone 500mg twice daily is ok for now be advised this is an NSAID  HOLD LEFLUNOMIDE-until CT evidence that all hepatic abscesses are resolved.   Continue-Hydroxychloroquine 200mg BID  relafen 500mg BID      Receiving from Psych     - Duloxetine (Cymbalta) 60mg daily due to insurance cost has been off.    -Continue Gabapentin 300mg BID    Labs every 3 months  Had shingles vaccine 9/2018      Follow up in about 4 months (around 12/20/2019).          40min consultation with  greater than 50% spent in counseling, chart review and coordination of care. All questions answered. Patient medication list with duplications of NSAIDs and confusion over medications which I have clarified each and every medication today.

## 2019-10-11 RX ORDER — HYDROXYCHLOROQUINE SULFATE 200 MG/1
TABLET, FILM COATED ORAL
Qty: 60 TABLET | Refills: 6 | Status: SHIPPED | OUTPATIENT
Start: 2019-10-11 | End: 2020-01-13 | Stop reason: SDUPTHER

## 2019-10-21 ENCOUNTER — PATIENT MESSAGE (OUTPATIENT)
Dept: RHEUMATOLOGY | Facility: CLINIC | Age: 69
End: 2019-10-21

## 2019-10-21 ENCOUNTER — LAB VISIT (OUTPATIENT)
Dept: LAB | Facility: HOSPITAL | Age: 69
End: 2019-10-21
Attending: INTERNAL MEDICINE
Payer: MEDICARE

## 2019-10-21 DIAGNOSIS — M19.90 INFLAMMATORY ARTHRITIS: ICD-10-CM

## 2019-10-21 LAB
ALBUMIN SERPL BCP-MCNC: 3.6 G/DL (ref 3.5–5.2)
ALP SERPL-CCNC: 97 U/L (ref 55–135)
ALT SERPL W/O P-5'-P-CCNC: 20 U/L (ref 10–44)
ANION GAP SERPL CALC-SCNC: 11 MMOL/L (ref 8–16)
AST SERPL-CCNC: 23 U/L (ref 10–40)
BASOPHILS # BLD AUTO: 0.08 K/UL (ref 0–0.2)
BASOPHILS NFR BLD: 1.1 % (ref 0–1.9)
BILIRUB SERPL-MCNC: 0.4 MG/DL (ref 0.1–1)
BUN SERPL-MCNC: 17 MG/DL (ref 8–23)
CALCIUM SERPL-MCNC: 9.5 MG/DL (ref 8.7–10.5)
CHLORIDE SERPL-SCNC: 99 MMOL/L (ref 95–110)
CO2 SERPL-SCNC: 28 MMOL/L (ref 23–29)
CREAT SERPL-MCNC: 0.8 MG/DL (ref 0.5–1.4)
CRP SERPL-MCNC: 1.1 MG/L (ref 0–8.2)
DIFFERENTIAL METHOD: ABNORMAL
EOSINOPHIL # BLD AUTO: 0.2 K/UL (ref 0–0.5)
EOSINOPHIL NFR BLD: 2.5 % (ref 0–8)
ERYTHROCYTE [DISTWIDTH] IN BLOOD BY AUTOMATED COUNT: 14.2 % (ref 11.5–14.5)
ERYTHROCYTE [SEDIMENTATION RATE] IN BLOOD BY WESTERGREN METHOD: 22 MM/HR (ref 0–20)
EST. GFR  (AFRICAN AMERICAN): >60 ML/MIN/1.73 M^2
EST. GFR  (NON AFRICAN AMERICAN): >60 ML/MIN/1.73 M^2
GLUCOSE SERPL-MCNC: 65 MG/DL (ref 70–110)
HCT VFR BLD AUTO: 43.9 % (ref 37–48.5)
HGB BLD-MCNC: 14 G/DL (ref 12–16)
IMM GRANULOCYTES # BLD AUTO: 0.03 K/UL (ref 0–0.04)
IMM GRANULOCYTES NFR BLD AUTO: 0.4 % (ref 0–0.5)
LYMPHOCYTES # BLD AUTO: 2.5 K/UL (ref 1–4.8)
LYMPHOCYTES NFR BLD: 34.4 % (ref 18–48)
MCH RBC QN AUTO: 29.3 PG (ref 27–31)
MCHC RBC AUTO-ENTMCNC: 31.9 G/DL (ref 32–36)
MCV RBC AUTO: 92 FL (ref 82–98)
MONOCYTES # BLD AUTO: 0.9 K/UL (ref 0.3–1)
MONOCYTES NFR BLD: 12.1 % (ref 4–15)
NEUTROPHILS # BLD AUTO: 3.5 K/UL (ref 1.8–7.7)
NEUTROPHILS NFR BLD: 49.5 % (ref 38–73)
NRBC BLD-RTO: 0 /100 WBC
PLATELET # BLD AUTO: 278 K/UL (ref 150–350)
PMV BLD AUTO: 11.3 FL (ref 9.2–12.9)
POTASSIUM SERPL-SCNC: 4.1 MMOL/L (ref 3.5–5.1)
PROT SERPL-MCNC: 7.6 G/DL (ref 6–8.4)
RBC # BLD AUTO: 4.78 M/UL (ref 4–5.4)
SODIUM SERPL-SCNC: 138 MMOL/L (ref 136–145)
WBC # BLD AUTO: 7.13 K/UL (ref 3.9–12.7)

## 2019-10-21 PROCEDURE — 86140 C-REACTIVE PROTEIN: CPT

## 2019-10-21 PROCEDURE — 80053 COMPREHEN METABOLIC PANEL: CPT

## 2019-10-21 PROCEDURE — 36415 COLL VENOUS BLD VENIPUNCTURE: CPT | Mod: PO

## 2019-10-21 PROCEDURE — 85651 RBC SED RATE NONAUTOMATED: CPT | Mod: PO

## 2019-10-21 PROCEDURE — 85025 COMPLETE CBC W/AUTO DIFF WBC: CPT

## 2019-10-21 NOTE — TELEPHONE ENCOUNTER
CT of the abdomen follow-up for liver abscess:  10/4/2019    Comparison from 07/09/2019    Decrease in number and size of hepatic hypodense lesions are only 3 lesions remaining the largest of which measures 14 mm.    Recent visit with her primary purely she had seen Dr. Lee order an BHAVIN  C-reactive protein was normal double-stranded DNA by gilberto is negative    No prior concern for SLE- can be triggered by known infection.

## 2020-01-13 ENCOUNTER — OFFICE VISIT (OUTPATIENT)
Dept: RHEUMATOLOGY | Facility: CLINIC | Age: 70
End: 2020-01-13
Payer: MEDICARE

## 2020-01-13 ENCOUNTER — LAB VISIT (OUTPATIENT)
Dept: LAB | Facility: HOSPITAL | Age: 70
End: 2020-01-13
Attending: INTERNAL MEDICINE
Payer: MEDICARE

## 2020-01-13 VITALS
BODY MASS INDEX: 32.78 KG/M2 | WEIGHT: 196.75 LBS | HEIGHT: 65 IN | DIASTOLIC BLOOD PRESSURE: 72 MMHG | HEART RATE: 83 BPM | SYSTOLIC BLOOD PRESSURE: 125 MMHG

## 2020-01-13 DIAGNOSIS — D84.9 IMMUNOSUPPRESSION: ICD-10-CM

## 2020-01-13 DIAGNOSIS — M19.90 INFLAMMATORY ARTHRITIS: ICD-10-CM

## 2020-01-13 DIAGNOSIS — L40.50 PSORIATIC ARTHRITIS: ICD-10-CM

## 2020-01-13 DIAGNOSIS — M19.90 INFLAMMATORY ARTHRITIS: Primary | ICD-10-CM

## 2020-01-13 LAB
ALBUMIN SERPL BCP-MCNC: 3.9 G/DL (ref 3.5–5.2)
ALP SERPL-CCNC: 97 U/L (ref 55–135)
ALT SERPL W/O P-5'-P-CCNC: 28 U/L (ref 10–44)
ANION GAP SERPL CALC-SCNC: 9 MMOL/L (ref 8–16)
AST SERPL-CCNC: 28 U/L (ref 10–40)
BASOPHILS # BLD AUTO: 0.09 K/UL (ref 0–0.2)
BASOPHILS NFR BLD: 1 % (ref 0–1.9)
BILIRUB SERPL-MCNC: 0.4 MG/DL (ref 0.1–1)
BUN SERPL-MCNC: 18 MG/DL (ref 8–23)
CALCIUM SERPL-MCNC: 9.7 MG/DL (ref 8.7–10.5)
CHLORIDE SERPL-SCNC: 102 MMOL/L (ref 95–110)
CO2 SERPL-SCNC: 31 MMOL/L (ref 23–29)
CREAT SERPL-MCNC: 0.9 MG/DL (ref 0.5–1.4)
CRP SERPL-MCNC: 0.5 MG/L (ref 0–8.2)
DIFFERENTIAL METHOD: ABNORMAL
EOSINOPHIL # BLD AUTO: 0.2 K/UL (ref 0–0.5)
EOSINOPHIL NFR BLD: 2.4 % (ref 0–8)
ERYTHROCYTE [DISTWIDTH] IN BLOOD BY AUTOMATED COUNT: 13 % (ref 11.5–14.5)
ERYTHROCYTE [SEDIMENTATION RATE] IN BLOOD BY WESTERGREN METHOD: 16 MM/HR (ref 0–20)
EST. GFR  (AFRICAN AMERICAN): >60 ML/MIN/1.73 M^2
EST. GFR  (NON AFRICAN AMERICAN): >60 ML/MIN/1.73 M^2
GLUCOSE SERPL-MCNC: 99 MG/DL (ref 70–110)
HCT VFR BLD AUTO: 45.4 % (ref 37–48.5)
HGB BLD-MCNC: 14.6 G/DL (ref 12–16)
IMM GRANULOCYTES # BLD AUTO: 0.05 K/UL (ref 0–0.04)
IMM GRANULOCYTES NFR BLD AUTO: 0.5 % (ref 0–0.5)
LYMPHOCYTES # BLD AUTO: 3.2 K/UL (ref 1–4.8)
LYMPHOCYTES NFR BLD: 34.6 % (ref 18–48)
MCH RBC QN AUTO: 30.6 PG (ref 27–31)
MCHC RBC AUTO-ENTMCNC: 32.2 G/DL (ref 32–36)
MCV RBC AUTO: 95 FL (ref 82–98)
MONOCYTES # BLD AUTO: 1 K/UL (ref 0.3–1)
MONOCYTES NFR BLD: 10.3 % (ref 4–15)
NEUTROPHILS # BLD AUTO: 4.7 K/UL (ref 1.8–7.7)
NEUTROPHILS NFR BLD: 51.2 % (ref 38–73)
NRBC BLD-RTO: 0 /100 WBC
PLATELET # BLD AUTO: 286 K/UL (ref 150–350)
PMV BLD AUTO: 11 FL (ref 9.2–12.9)
POTASSIUM SERPL-SCNC: 4.3 MMOL/L (ref 3.5–5.1)
PROT SERPL-MCNC: 7.9 G/DL (ref 6–8.4)
RBC # BLD AUTO: 4.77 M/UL (ref 4–5.4)
SODIUM SERPL-SCNC: 142 MMOL/L (ref 136–145)
WBC # BLD AUTO: 9.18 K/UL (ref 3.9–12.7)

## 2020-01-13 PROCEDURE — 1125F AMNT PAIN NOTED PAIN PRSNT: CPT | Mod: ,,, | Performed by: INTERNAL MEDICINE

## 2020-01-13 PROCEDURE — 85651 RBC SED RATE NONAUTOMATED: CPT | Mod: PO

## 2020-01-13 PROCEDURE — 36415 COLL VENOUS BLD VENIPUNCTURE: CPT | Mod: PO

## 2020-01-13 PROCEDURE — 1159F PR MEDICATION LIST DOCUMENTED IN MEDICAL RECORD: ICD-10-PCS | Mod: ,,, | Performed by: INTERNAL MEDICINE

## 2020-01-13 PROCEDURE — 99999 PR PBB SHADOW E&M-EST. PATIENT-LVL III: CPT | Mod: PBBFAC,,, | Performed by: INTERNAL MEDICINE

## 2020-01-13 PROCEDURE — 99213 OFFICE O/P EST LOW 20 MIN: CPT | Mod: PBBFAC,PO | Performed by: INTERNAL MEDICINE

## 2020-01-13 PROCEDURE — 99214 PR OFFICE/OUTPT VISIT, EST, LEVL IV, 30-39 MIN: ICD-10-PCS | Mod: S$PBB,,, | Performed by: INTERNAL MEDICINE

## 2020-01-13 PROCEDURE — 1159F MED LIST DOCD IN RCRD: CPT | Mod: ,,, | Performed by: INTERNAL MEDICINE

## 2020-01-13 PROCEDURE — 85025 COMPLETE CBC W/AUTO DIFF WBC: CPT

## 2020-01-13 PROCEDURE — 99999 PR PBB SHADOW E&M-EST. PATIENT-LVL III: ICD-10-PCS | Mod: PBBFAC,,, | Performed by: INTERNAL MEDICINE

## 2020-01-13 PROCEDURE — 1125F PR PAIN SEVERITY QUANTIFIED, PAIN PRESENT: ICD-10-PCS | Mod: ,,, | Performed by: INTERNAL MEDICINE

## 2020-01-13 PROCEDURE — 80053 COMPREHEN METABOLIC PANEL: CPT

## 2020-01-13 PROCEDURE — 86140 C-REACTIVE PROTEIN: CPT

## 2020-01-13 PROCEDURE — 99214 OFFICE O/P EST MOD 30 MIN: CPT | Mod: S$PBB,,, | Performed by: INTERNAL MEDICINE

## 2020-01-13 RX ORDER — HYDROXYCHLOROQUINE SULFATE 200 MG/1
200 TABLET, FILM COATED ORAL 2 TIMES DAILY
Qty: 60 TABLET | Refills: 6 | Status: SHIPPED | OUTPATIENT
Start: 2020-01-13 | End: 2020-07-28

## 2020-01-13 RX ORDER — ASPIRIN 81 MG/1
TABLET ORAL
COMMUNITY

## 2020-01-13 RX ORDER — CYANOCOBALAMIN (VITAMIN B-12) 500 MCG
1 TABLET ORAL
COMMUNITY

## 2020-01-13 RX ORDER — LEFLUNOMIDE 10 MG/1
10 TABLET ORAL DAILY
Qty: 90 TABLET | Refills: 1 | Status: SHIPPED | OUTPATIENT
Start: 2020-01-13 | End: 2020-07-28

## 2020-01-13 ASSESSMENT — ROUTINE ASSESSMENT OF PATIENT INDEX DATA (RAPID3)
FATIGUE SCORE: 1.1
PSYCHOLOGICAL DISTRESS SCORE: 2.2
PAIN SCORE: 4
TOTAL RAPID3 SCORE: 3.56
PATIENT GLOBAL ASSESSMENT SCORE: 5
MDHAQ FUNCTION SCORE: .5

## 2020-01-13 NOTE — PROGRESS NOTES
Subjective:          Chief Complaint: Adina Li is a 69 y.o. female who had concerns including Disease Management.    HPI:    Patient is a 68-year-old female here for f/u of PsA vs EOA (+hx of psoriasis but no active plaques) and fibromyalgia.    Serologies: CCP negative,  rheumatoid factor negative,   Imaging with erosive changes at the capitate and DIP.  Patient with recent admission and hepatic abscess (multiple) with sepsis strep species. Unclear source of infection. But did have RFA 1-2 weeks prior to becoming ill. Patient immunosuppressed modestly with Arava at 10mg daily.   Completed ABX outpatient.     Repeated CT ABD 10/2019 - marked improvement.   Resumed LFA 10/2019 no fevers, ABD pain.     Noting burning and stinging in her joints. Pains and stiffness. This is chronic but stable.     Current regimen:    HCQ 200mg BID  Arava 10mg daily.    No significant benefit with MTX   Nabumetone 500mg BID     Now receiving from Psych:   Gabapentin 300mg BID  Cymbalta 60mg BID      past medical history for type 2 diabetes mellitus, hypertension, IBS, previous myocardial infarction and diverticulitis.   She has DIP pain, swelling and deformity. Some PIP, little MCP and some wrist tenderness. She has long hx of arthritis with bilateral TKA. She has hx of Lumbar DDD/DJD and cervical DDD with [facility]-Dr. Sim. Patient notes this started as a young woman 20s and 30s. She was rather active. She has some pain stiffness in shoulders. Currently right ring finger burns and stiff. Described as ache. he is taking Advil PM helps with pain and for sleep, chondroitin sulfate or move free. Recently started [place] water and feeling much better.   She is s/p CAD with stenting x 2 and sister with MI  at 45y/o.    Patient with some dry mouth, no dry eyes, no serositis, pleurisy, ILD. ? Psoriasis scalp.  [facility] with cardiac work up no occlusive CAD.2017. Follows regularly with Cardiology  Did just have interventional pain RFA  for both lumbar and neck-Dr. Carlton.       REVIEW OF SYSTEMS:    Review of Systems   Constitutional: Negative for fever, malaise/fatigue and weight loss.   HENT: Negative for sore throat.    Eyes: Negative for double vision, photophobia and redness.   Respiratory: Negative for cough, shortness of breath and wheezing.    Cardiovascular: Negative for chest pain, palpitations and orthopnea.   Gastrointestinal: Negative for abdominal pain, constipation and diarrhea.   Genitourinary: Negative for dysuria, hematuria and urgency.   Musculoskeletal: Positive for back pain and joint pain. Negative for myalgias.   Skin: Negative for rash.   Neurological: Negative for dizziness, tingling, focal weakness and headaches.   Endo/Heme/Allergies: Does not bruise/bleed easily.   Psychiatric/Behavioral: Negative for depression, hallucinations and suicidal ideas.               Objective:            Past Medical History:   Diagnosis Date    Acid reflux     Anxiety     Arthritis     CAD (coronary artery disease)     Stant    Diabetes mellitus     Fibromyalgia     Hypertension     IBS (irritable bowel syndrome)     Myocardial infarction     Ulcerative colitis      Family History   Problem Relation Age of Onset    Colon cancer Mother     Heart attack Father     Heart attack Sister     Diabetes Sister     Breast cancer Sister      Social History     Tobacco Use    Smoking status: Former Smoker     Last attempt to quit: 2005     Years since quittin.6    Smokeless tobacco: Never Used   Substance Use Topics    Alcohol use: No     Alcohol/week: 0.0 standard drinks    Drug use: No         Current Outpatient Medications on File Prior to Visit   Medication Sig Dispense Refill    aspirin 81 MG Chew Take 81 mg by mouth once daily.      blood sugar diagnostic Strp 1 strip by Misc.(Non-Drug; Combo Route) route 4 (four) times daily. 150 each 11    cartilage/collagen II/hyaluron (MOVE FREE ULTRA ORAL) Take 1 tablet by  mouth.      co-enzyme Q-10 30 mg capsule Take 100 mg by mouth once daily.      diphenoxylate-atropine 2.5-0.025 mg (LOMOTIL) 2.5-0.025 mg per tablet   1    dulaglutide (TRULICITY) 1.5 mg/0.5 mL PnIj Inject 1.5 mg into the skin.      DULoxetine (CYMBALTA) 60 MG capsule TK 1 C PO  capsule 2    furosemide (LASIX) 40 MG tablet Take 40 mg by mouth as needed.      glucosamine-chondroitin 500-400 mg tablet Take 1 tablet by mouth once daily.      hydrocodone-acetaminophen 5-325mg (NORCO) 5-325 mg per tablet Take 1 tablet by mouth nightly as needed for Pain.      hydroxychloroquine (PLAQUENIL) 200 mg tablet TAKE ONE TABLET BY MOUTH twice daily 60 tablet 6    insulin aspart U-100 (NOVOLOG) 100 unit/mL injection Inject 14 Units into the skin 3 (three) times daily before meals. 6 vial 3    insulin syringe-needle U-100 (BD INSULIN SYRINGE ULT-FINE II) 1 mL 31 gauge x 5/16 Syrg 1 applicator by Misc.(Non-Drug; Combo Route) route 4 (four) times daily. 400 each 3    krill-om3-dha-epa-om6-lip-astx (KRILL OIL, OMEGA 3 AND 6,) 1,500-165-67.5 mg Cap Take by mouth once daily.      lancets 31 gauge Misc 1 lancet by Misc.(Non-Drug; Combo Route) route 4 (four) times daily. 200 each 4    leflunomide (ARAVA) 10 MG Tab TAKE 1 TABLET ONCE DAILY 90 tablet 0    LEVEMIR U-100 INSULIN 100 unit/mL injection INJECT 75 UNITS INTO THE SKIN EVERY EVENING. 10 mL 1    levothyroxine (SYNTHROID) 88 MCG tablet       losartan (COZAAR) 50 MG tablet losartan 50 mg tablet      metoprolol succinate (TOPROL-XL) 50 MG 24 hr tablet       mirtazapine (REMERON) 15 MG tablet Take 15 mg by mouth.      multivitamin (THERAGRAN) per tablet Take 1 tablet by mouth once daily.      nabumetone (RELAFEN) 500 MG tablet Take 1 tablet (500 mg total) by mouth 2 (two) times daily. 60 tablet 4    nitroGLYCERIN (NITROSTAT) 0.4 MG SL tablet PLACE ONE TABLET UNDER THE TONGUE EVERY 5 MINUTES as needed for chest pain  1    pen needle, diabetic (BD ULTRA-FINE  "ALEX PEN NEEDLE) 32 gauge x 5/32" Ndle       pravastatin (PRAVACHOL) 20 MG tablet Take 20 mg by mouth once daily.      TRULICITY 1.5 mg/0.5 mL PnIj INJECT ONE pen SUBCUTANEOUSLY once a week  6    aspirin (ECOTRIN) 81 MG EC tablet aspirin low dose 81mg ec      cholecalciferol, vitamin D3, (VITAMIN D3) 10 mcg (400 unit) Tab Take 1 capsule by mouth.      gabapentin (NEURONTIN) 300 MG capsule Take 1 capsule (300 mg total) by mouth 2 (two) times daily. 180 capsule 3     No current facility-administered medications on file prior to visit.        Vitals:    01/13/20 1027   BP: 125/72   Pulse: 83       Physical Exam:    Physical Exam   Constitutional: She appears well-developed and well-nourished.   HENT:   Nose: No septal deviation.   Mouth/Throat: Mucous membranes are normal. No oral lesions.   Eyes: Pupils are equal, round, and reactive to light. Right conjunctiva is not injected. Left conjunctiva is not injected.   Neck: No JVD present. No thyroid mass and no thyromegaly present.   Cardiovascular: Normal rate, regular rhythm and normal pulses.   No edema   Pulmonary/Chest: Effort normal and breath sounds normal.   Abdominal: Soft. Normal appearance. There is no hepatosplenomegaly.   Musculoskeletal:        Right shoulder: She exhibits normal range of motion, no tenderness and no swelling.        Left shoulder: She exhibits normal range of motion, no tenderness and no swelling.        Right elbow: She exhibits normal range of motion and no swelling. No tenderness found.        Left elbow: She exhibits normal range of motion and no swelling. No tenderness found.        Right wrist: She exhibits normal range of motion, no tenderness and no swelling.        Left wrist: She exhibits normal range of motion, no tenderness and no swelling.        Right hip: She exhibits normal range of motion, normal strength and no swelling.        Left hip: She exhibits normal range of motion, no tenderness and no swelling.        Right " knee: She exhibits normal range of motion and no swelling. No tenderness found.        Left knee: She exhibits normal range of motion and no swelling. No tenderness found.        Right ankle: She exhibits normal range of motion and no swelling. No tenderness.        Left ankle: She exhibits normal range of motion and no swelling. No tenderness.        Right hand: She exhibits decreased range of motion, tenderness and deformity.        Left hand: She exhibits decreased range of motion, tenderness and deformity.        Left foot: There is tenderness and swelling.   Patient is a DIP bony hypertrophy with deformity particular the second DIP bilaterally.  She has slight erythema at the third fourth and fifth right DIP she's difficulty with finger curl  strength reduced . no active synovitis in the wrist MCPs or PIPs.    +++tenderness at the left ischial bursa.    Lymphadenopathy:     She has no cervical adenopathy.     She has no axillary adenopathy.   Neurological: She has normal strength and normal reflexes.   Skin: Skin is dry and intact.   Psychiatric: She has a normal mood and affect.             Assessment:       Encounter Diagnoses   Name Primary?    Inflammatory arthritis Yes    Immunosuppression           Plan:        Inflammatory arthritis  -     CBC auto differential; Standing; Expected date: 01/13/2020  -     Comprehensive metabolic panel; Standing; Expected date: 01/13/2020  -     Sedimentation rate; Standing; Expected date: 01/13/2020  -     C-reactive protein; Standing; Expected date: 01/13/2020  -     leflunomide (ARAVA) 10 MG Tab; Take 1 tablet (10 mg total) by mouth once daily.  Dispense: 90 tablet; Refill: 1  -     hydroxychloroquine (PLAQUENIL) 200 mg tablet; Take 1 tablet (200 mg total) by mouth 2 (two) times daily.  Dispense: 60 tablet; Refill: 6    Immunosuppression  -     CBC auto differential; Standing; Expected date: 01/13/2020  -     Comprehensive metabolic panel; Standing; Expected date:  01/13/2020  -     Sedimentation rate; Standing; Expected date: 01/13/2020  -     C-reactive protein; Standing; Expected date: 01/13/2020    Psoriatic arthritis  Comments:  possible  Orders:  -     leflunomide (ARAVA) 10 MG Tab; Take 1 tablet (10 mg total) by mouth once daily.  Dispense: 90 tablet; Refill: 1  -     hydroxychloroquine (PLAQUENIL) 200 mg tablet; Take 1 tablet (200 mg total) by mouth 2 (two) times daily.  Dispense: 60 tablet; Refill: 6      Nabumetone 500mg twice daily is ok for now be advised this is an NSAID  Restarted LFA (no complications to date) due for labs.   Continue-Hydroxychloroquine 200mg BID  relafen 500mg BID       Receiving from Psych     - Duloxetine (Cymbalta) 60mg daily due to insurance cost has been off.    -Continue Gabapentin 300mg BID    Labs every 3 months  Had shingles vaccine 9/2018      Follow up in about 4 months (around 5/13/2020).          30min consultation with greater than 50% spent in counseling, chart review and coordination of care. All questions answered.

## 2020-05-07 ENCOUNTER — TELEPHONE (OUTPATIENT)
Dept: RHEUMATOLOGY | Facility: CLINIC | Age: 70
End: 2020-05-07

## 2020-05-07 NOTE — TELEPHONE ENCOUNTER
----- Message from Brigid Sanchez sent at 5/7/2020 12:28 PM CDT -----  Contact: Pt  Pt called and said she just missed call and wants a call back     Pt can be reached at [phone]    Switched to virtual visit. Explained to the patient how to get ready for virtual visit. RAYMOND

## 2020-05-11 ENCOUNTER — LAB VISIT (OUTPATIENT)
Dept: LAB | Facility: HOSPITAL | Age: 70
End: 2020-05-11
Attending: INTERNAL MEDICINE
Payer: MEDICARE

## 2020-05-11 ENCOUNTER — TELEPHONE (OUTPATIENT)
Dept: RHEUMATOLOGY | Facility: CLINIC | Age: 70
End: 2020-05-11

## 2020-05-11 DIAGNOSIS — D84.9 IMMUNOSUPPRESSION: ICD-10-CM

## 2020-05-11 DIAGNOSIS — M19.90 INFLAMMATORY ARTHRITIS: ICD-10-CM

## 2020-05-11 LAB
ALBUMIN SERPL BCP-MCNC: 3.6 G/DL (ref 3.5–5.2)
ALP SERPL-CCNC: 90 U/L (ref 55–135)
ALT SERPL W/O P-5'-P-CCNC: 27 U/L (ref 10–44)
ANION GAP SERPL CALC-SCNC: 9 MMOL/L (ref 8–16)
AST SERPL-CCNC: 27 U/L (ref 10–40)
BASOPHILS # BLD AUTO: 0.07 K/UL (ref 0–0.2)
BASOPHILS NFR BLD: 1.2 % (ref 0–1.9)
BILIRUB SERPL-MCNC: 0.6 MG/DL (ref 0.1–1)
BUN SERPL-MCNC: 16 MG/DL (ref 8–23)
CALCIUM SERPL-MCNC: 9.5 MG/DL (ref 8.7–10.5)
CHLORIDE SERPL-SCNC: 102 MMOL/L (ref 95–110)
CO2 SERPL-SCNC: 28 MMOL/L (ref 23–29)
CREAT SERPL-MCNC: 0.9 MG/DL (ref 0.5–1.4)
CRP SERPL-MCNC: 1.1 MG/L (ref 0–8.2)
DIFFERENTIAL METHOD: ABNORMAL
EOSINOPHIL # BLD AUTO: 0.2 K/UL (ref 0–0.5)
EOSINOPHIL NFR BLD: 3.4 % (ref 0–8)
ERYTHROCYTE [DISTWIDTH] IN BLOOD BY AUTOMATED COUNT: 12.8 % (ref 11.5–14.5)
ERYTHROCYTE [SEDIMENTATION RATE] IN BLOOD BY WESTERGREN METHOD: 25 MM/HR (ref 0–20)
EST. GFR  (AFRICAN AMERICAN): >60 ML/MIN/1.73 M^2
EST. GFR  (NON AFRICAN AMERICAN): >60 ML/MIN/1.73 M^2
GLUCOSE SERPL-MCNC: 153 MG/DL (ref 70–110)
HCT VFR BLD AUTO: 42.7 % (ref 37–48.5)
HGB BLD-MCNC: 13.9 G/DL (ref 12–16)
IMM GRANULOCYTES # BLD AUTO: 0.01 K/UL (ref 0–0.04)
IMM GRANULOCYTES NFR BLD AUTO: 0.2 % (ref 0–0.5)
LYMPHOCYTES # BLD AUTO: 1.9 K/UL (ref 1–4.8)
LYMPHOCYTES NFR BLD: 31.3 % (ref 18–48)
MCH RBC QN AUTO: 30.3 PG (ref 27–31)
MCHC RBC AUTO-ENTMCNC: 32.6 G/DL (ref 32–36)
MCV RBC AUTO: 93 FL (ref 82–98)
MONOCYTES # BLD AUTO: 1 K/UL (ref 0.3–1)
MONOCYTES NFR BLD: 16 % (ref 4–15)
NEUTROPHILS # BLD AUTO: 2.9 K/UL (ref 1.8–7.7)
NEUTROPHILS NFR BLD: 47.9 % (ref 38–73)
NRBC BLD-RTO: 0 /100 WBC
PLATELET # BLD AUTO: 269 K/UL (ref 150–350)
PMV BLD AUTO: 11.2 FL (ref 9.2–12.9)
POTASSIUM SERPL-SCNC: 4.4 MMOL/L (ref 3.5–5.1)
PROT SERPL-MCNC: 7.4 G/DL (ref 6–8.4)
RBC # BLD AUTO: 4.58 M/UL (ref 4–5.4)
SODIUM SERPL-SCNC: 139 MMOL/L (ref 136–145)
WBC # BLD AUTO: 5.94 K/UL (ref 3.9–12.7)

## 2020-05-11 PROCEDURE — 80053 COMPREHEN METABOLIC PANEL: CPT

## 2020-05-11 PROCEDURE — 86140 C-REACTIVE PROTEIN: CPT

## 2020-05-11 PROCEDURE — 85651 RBC SED RATE NONAUTOMATED: CPT | Mod: PO

## 2020-05-11 PROCEDURE — 36415 COLL VENOUS BLD VENIPUNCTURE: CPT | Mod: PO

## 2020-05-11 PROCEDURE — 85025 COMPLETE CBC W/AUTO DIFF WBC: CPT

## 2020-05-11 NOTE — TELEPHONE ENCOUNTER
----- Message from Aliza Agudelo sent at 5/11/2020  9:18 AM CDT -----  Contact: Patient Vitual Appt  Type: Needs Medical Advice  Who Called:  Patient  Best Call Back Number: [phone]  Additional Information: pt is requesting a call back in regards to assistance with her pt portal before wednesdays appt.

## 2020-05-11 NOTE — TELEPHONE ENCOUNTER
Spoke to pt and she stated she thinks she got it to work but will call back Thursday to verify if needed.

## 2020-05-14 ENCOUNTER — OFFICE VISIT (OUTPATIENT)
Dept: RHEUMATOLOGY | Facility: CLINIC | Age: 70
End: 2020-05-14
Payer: MEDICARE

## 2020-05-14 DIAGNOSIS — M19.90 INFLAMMATORY ARTHRITIS: Primary | ICD-10-CM

## 2020-05-14 DIAGNOSIS — M79.7 FIBROMYALGIA: ICD-10-CM

## 2020-05-14 PROCEDURE — 99443 PR PHYSICIAN TELEPHONE EVALUATION 21-30 MIN: ICD-10-PCS | Mod: 95,,, | Performed by: INTERNAL MEDICINE

## 2020-05-14 PROCEDURE — 99443 PR PHYSICIAN TELEPHONE EVALUATION 21-30 MIN: CPT | Mod: 95,,, | Performed by: INTERNAL MEDICINE

## 2020-05-14 NOTE — PROGRESS NOTES
Established Patient - Audio Only Telehealth Visit    The reason for the audio only service rather than synchronous audio and video virtual visit was related to technical difficulties or patient preference/necessity.     Each patient to whom I provide medical services by telemedicine is:  (1) informed of the relationship between the physician and patient and the respective role of any other health care provider with respect to management of the patient; and (2) notified that they may decline to receive medical services by telemedicine and may withdraw from such care at any time. Patient verbally consented to receive this service via voice-only telephone call.     PsA with EOA: chronically   Nabumetone 500mg twice daily is ok for now be advised this is an NSAID  Restarted LFA (no complications to date) due for labs.   Continue-Hydroxychloroquine 200mg BID     Receiving from Psych               - Duloxetine (Cymbalta) 60mg daily due to insurance cost has been off.               -Continue Gabapentin 300mg BID     Labs every 3 months  Had shingles vaccine 9/2018        This service was not originating from a related E/M service provided within the previous 7 days nor will  to an E/M service or procedure within the next 24 hours or my soonest available appointment.  Prevailing standard of care was able to be met in this audio-only visit.    25min.

## 2020-05-18 ENCOUNTER — TELEPHONE (OUTPATIENT)
Dept: RHEUMATOLOGY | Facility: CLINIC | Age: 70
End: 2020-05-18

## 2020-05-18 NOTE — TELEPHONE ENCOUNTER
----- Message from Beatriz Guevara DO sent at 5/14/2020  2:53 PM CDT -----  F/u in 4 months.   labs 3 months. Rod 4. DR. BUSTAMANTE

## 2020-07-27 DIAGNOSIS — M19.90 INFLAMMATORY ARTHRITIS: ICD-10-CM

## 2020-07-27 DIAGNOSIS — L40.50 PSORIATIC ARTHRITIS: ICD-10-CM

## 2020-07-27 NOTE — TELEPHONE ENCOUNTER
----- Message from Leticia CICI Byrne sent at 7/27/2020  9:34 AM CDT -----  Regarding: refill  Contact: pt  Type:  RX Refill Request    Who Called:  patient   Refill or New Rx:  refill  RX Name and Strength:  r hydroxychloroquine (PLAQUENIL) 200 mg tablet and leflunomide (ARAVA) 10 MG Tab  How is the patient currently taking it? (ex. 1XDay):    Is this a 30 day or 90 day RX:  60  Preferred Pharmacy with phone number:      [facility] [address] [place], [place] - [address] [facility]  [address] [address]  Phone: [phone] Fax: [phone]      Local or Mail Order:  Local  Ordering Provider:  Dr. Ismael Johnson Call Back Number:  [phone] (home)     Additional Information:  please call when rx is sent over. Nurse Esther patient called to the pharmacy to get the refill but none was availble. Please advise

## 2020-08-03 RX ORDER — HYDROXYCHLOROQUINE SULFATE 200 MG/1
200 TABLET, FILM COATED ORAL 2 TIMES DAILY
Qty: 60 TABLET | Refills: 6 | OUTPATIENT
Start: 2020-08-03

## 2020-08-21 ENCOUNTER — LAB VISIT (OUTPATIENT)
Dept: LAB | Facility: HOSPITAL | Age: 70
End: 2020-08-21
Attending: INTERNAL MEDICINE
Payer: MEDICARE

## 2020-08-21 DIAGNOSIS — D84.9 IMMUNOSUPPRESSION: ICD-10-CM

## 2020-08-21 DIAGNOSIS — M19.90 INFLAMMATORY ARTHRITIS: ICD-10-CM

## 2020-08-21 LAB
ALBUMIN SERPL BCP-MCNC: 3.4 G/DL (ref 3.5–5.2)
ALP SERPL-CCNC: 80 U/L (ref 55–135)
ALT SERPL W/O P-5'-P-CCNC: 22 U/L (ref 10–44)
ANION GAP SERPL CALC-SCNC: 10 MMOL/L (ref 8–16)
AST SERPL-CCNC: 24 U/L (ref 10–40)
BASOPHILS # BLD AUTO: 0.09 K/UL (ref 0–0.2)
BASOPHILS NFR BLD: 1.3 % (ref 0–1.9)
BILIRUB SERPL-MCNC: 0.5 MG/DL (ref 0.1–1)
BUN SERPL-MCNC: 20 MG/DL (ref 8–23)
CALCIUM SERPL-MCNC: 9.4 MG/DL (ref 8.7–10.5)
CHLORIDE SERPL-SCNC: 102 MMOL/L (ref 95–110)
CO2 SERPL-SCNC: 26 MMOL/L (ref 23–29)
CREAT SERPL-MCNC: 1 MG/DL (ref 0.5–1.4)
CRP SERPL-MCNC: 1.4 MG/L (ref 0–8.2)
DIFFERENTIAL METHOD: NORMAL
EOSINOPHIL # BLD AUTO: 0.2 K/UL (ref 0–0.5)
EOSINOPHIL NFR BLD: 2.7 % (ref 0–8)
ERYTHROCYTE [DISTWIDTH] IN BLOOD BY AUTOMATED COUNT: 12.9 % (ref 11.5–14.5)
ERYTHROCYTE [SEDIMENTATION RATE] IN BLOOD BY WESTERGREN METHOD: 18 MM/HR (ref 0–20)
EST. GFR  (AFRICAN AMERICAN): >60 ML/MIN/1.73 M^2
EST. GFR  (NON AFRICAN AMERICAN): 58 ML/MIN/1.73 M^2
GLUCOSE SERPL-MCNC: 234 MG/DL (ref 70–110)
HCT VFR BLD AUTO: 41 % (ref 37–48.5)
HGB BLD-MCNC: 13.1 G/DL (ref 12–16)
IMM GRANULOCYTES # BLD AUTO: 0.02 K/UL (ref 0–0.04)
IMM GRANULOCYTES NFR BLD AUTO: 0.3 % (ref 0–0.5)
LYMPHOCYTES # BLD AUTO: 2 K/UL (ref 1–4.8)
LYMPHOCYTES NFR BLD: 28.8 % (ref 18–48)
MCH RBC QN AUTO: 30 PG (ref 27–31)
MCHC RBC AUTO-ENTMCNC: 32 G/DL (ref 32–36)
MCV RBC AUTO: 94 FL (ref 82–98)
MONOCYTES # BLD AUTO: 1 K/UL (ref 0.3–1)
MONOCYTES NFR BLD: 13.8 % (ref 4–15)
NEUTROPHILS # BLD AUTO: 3.7 K/UL (ref 1.8–7.7)
NEUTROPHILS NFR BLD: 53.1 % (ref 38–73)
NRBC BLD-RTO: 0 /100 WBC
PLATELET # BLD AUTO: 243 K/UL (ref 150–350)
PMV BLD AUTO: 11.8 FL (ref 9.2–12.9)
POTASSIUM SERPL-SCNC: 4.3 MMOL/L (ref 3.5–5.1)
PROT SERPL-MCNC: 7.1 G/DL (ref 6–8.4)
RBC # BLD AUTO: 4.36 M/UL (ref 4–5.4)
SODIUM SERPL-SCNC: 138 MMOL/L (ref 136–145)
WBC # BLD AUTO: 6.97 K/UL (ref 3.9–12.7)

## 2020-08-21 PROCEDURE — 80053 COMPREHEN METABOLIC PANEL: CPT

## 2020-08-21 PROCEDURE — 85651 RBC SED RATE NONAUTOMATED: CPT | Mod: PO

## 2020-08-21 PROCEDURE — 36415 COLL VENOUS BLD VENIPUNCTURE: CPT | Mod: PO

## 2020-08-21 PROCEDURE — 85025 COMPLETE CBC W/AUTO DIFF WBC: CPT

## 2020-08-21 PROCEDURE — 86140 C-REACTIVE PROTEIN: CPT

## 2020-09-23 DIAGNOSIS — L40.50 PSORIATIC ARTHRITIS: ICD-10-CM

## 2020-09-23 DIAGNOSIS — M19.90 INFLAMMATORY ARTHRITIS: ICD-10-CM

## 2020-09-23 NOTE — TELEPHONE ENCOUNTER
----- Message from [place] Manley sent at 9/23/2020  3:23 PM CDT -----  Type:  RX Refill Request    Who Called:  patient   Refill or New Rx:  refill   RX Name and Strength:  hydroxychloroquine (PLAQUENIL) 200 mg tablet  How is the patient currently taking it? (ex. 1XDay):  as directed   Is this a 30 day or 90 day RX:  60, requesting 120 if possible   Preferred Pharmacy with phone number:    [facility] [id number]  [place] - [address] [facility]  [address] [place] [address]  Phone: [phone] Fax: [phone]  Local or Mail Order:  local   Ordering Provider:  Ismael Johnson Call Back Number:  [phone]  Additional Information:  Please advise-thank you

## 2020-09-24 RX ORDER — HYDROXYCHLOROQUINE SULFATE 200 MG/1
200 TABLET, FILM COATED ORAL 2 TIMES DAILY
Qty: 60 TABLET | Refills: 6 | Status: SHIPPED | OUTPATIENT
Start: 2020-09-24 | End: 2020-10-01

## 2020-09-30 ENCOUNTER — OFFICE VISIT (OUTPATIENT)
Dept: RHEUMATOLOGY | Facility: CLINIC | Age: 70
End: 2020-09-30
Payer: MEDICARE

## 2020-09-30 VITALS
WEIGHT: 193.81 LBS | BODY MASS INDEX: 32.29 KG/M2 | SYSTOLIC BLOOD PRESSURE: 116 MMHG | DIASTOLIC BLOOD PRESSURE: 76 MMHG | HEIGHT: 65 IN | HEART RATE: 65 BPM

## 2020-09-30 DIAGNOSIS — L40.50 PSORIATIC ARTHRITIS: ICD-10-CM

## 2020-09-30 DIAGNOSIS — M19.90 INFLAMMATORY ARTHRITIS: Primary | ICD-10-CM

## 2020-09-30 DIAGNOSIS — D84.9 IMMUNOSUPPRESSION: ICD-10-CM

## 2020-09-30 PROCEDURE — 99999 PR PBB SHADOW E&M-EST. PATIENT-LVL V: CPT | Mod: PBBFAC,,, | Performed by: INTERNAL MEDICINE

## 2020-09-30 PROCEDURE — 99999 PR PBB SHADOW E&M-EST. PATIENT-LVL V: ICD-10-PCS | Mod: PBBFAC,,, | Performed by: INTERNAL MEDICINE

## 2020-09-30 PROCEDURE — 99214 OFFICE O/P EST MOD 30 MIN: CPT | Mod: S$PBB,,, | Performed by: INTERNAL MEDICINE

## 2020-09-30 PROCEDURE — 99214 PR OFFICE/OUTPT VISIT, EST, LEVL IV, 30-39 MIN: ICD-10-PCS | Mod: S$PBB,,, | Performed by: INTERNAL MEDICINE

## 2020-09-30 PROCEDURE — 99215 OFFICE O/P EST HI 40 MIN: CPT | Mod: PBBFAC,PO | Performed by: INTERNAL MEDICINE

## 2020-09-30 ASSESSMENT — ROUTINE ASSESSMENT OF PATIENT INDEX DATA (RAPID3)
TOTAL RAPID3 SCORE: 4.33
PAIN SCORE: 5
FATIGUE SCORE: 1.1
MDHAQ FUNCTION SCORE: 0.9
PSYCHOLOGICAL DISTRESS SCORE: 3.3
PATIENT GLOBAL ASSESSMENT SCORE: 5

## 2020-09-30 NOTE — PROGRESS NOTES
Subjective:          Chief Complaint: Adina Li is a 70 y.o. female who had concerns including Disease Management.    HPI:    Patient is a 68-year-old female here for f/u of PsA vs EOA (+hx of psoriasis but no active plaques) and fibromyalgia.    Serologies: CCP negative,  rheumatoid factor negative,   Imaging with erosive changes at the capitate and DIP.  Patient with recent admission and hepatic abscess (multiple) with sepsis strep species. Unclear source of infection. But did have RFA 1-2 weeks prior to becoming ill. Patient immunosuppressed modestly with Arava at 10mg daily.   Completed ABX outpatient.     Repeated CT ABD 10/2019 - marked improvement.   Resumed LFA 10/2019 no fevers, ABD pain.     Noting burning and stinging in her joints. Pains and stiffness. This is chronic but stable.     Current regimen:    HCQ 200mg BID  Arava 10mg daily.    No significant benefit with MTX   Nabumetone 500mg BID     Now receiving from Psych:   Gabapentin 300mg BID  Cymbalta 60mg BID      past medical history for type 2 diabetes mellitus, hypertension, IBS, previous myocardial infarction and diverticulitis.   She has DIP pain, swelling and deformity. Some PIP, little MCP and some wrist tenderness. She has long hx of arthritis with bilateral TKA. She has hx of Lumbar DDD/DJD and cervical DDD with [facility]-Dr. Sim. Patient notes this started as a young woman 20s and 30s. She was rather active. She has some pain stiffness in shoulders. Currently right ring finger burns and stiff. Described as ache. he is taking Advil PM helps with pain and for sleep, chondroitin sulfate or move free. Recently started [place] water and feeling much better.   She is s/p CAD with stenting x 2 and sister with MI  at 43y/o.    Patient with some dry mouth, no dry eyes, no serositis, pleurisy, ILD. ? Psoriasis scalp.  [facility] with cardiac work up no occlusive CAD.2017. Follows regularly with Cardiology  Did just have interventional pain RFA  The patient is a 43y Male complaining of abdominal pain. for both lumbar and neck-Dr. Carlton.       REVIEW OF SYSTEMS:    Review of Systems   Constitutional: Negative for fever, malaise/fatigue and weight loss.   HENT: Negative for sore throat.    Eyes: Negative for double vision, photophobia and redness.   Respiratory: Negative for cough, shortness of breath and wheezing.    Cardiovascular: Negative for chest pain, palpitations and orthopnea.   Gastrointestinal: Negative for abdominal pain, constipation and diarrhea.   Genitourinary: Negative for dysuria, hematuria and urgency.   Musculoskeletal: Positive for back pain and joint pain. Negative for myalgias.   Skin: Negative for rash.   Neurological: Negative for dizziness, tingling, focal weakness and headaches.   Endo/Heme/Allergies: Does not bruise/bleed easily.   Psychiatric/Behavioral: Negative for depression, hallucinations and suicidal ideas.               Objective:            Past Medical History:   Diagnosis Date    Acid reflux     Anxiety     Arthritis     CAD (coronary artery disease)     Stant    Diabetes mellitus     Fibromyalgia     Hypertension     IBS (irritable bowel syndrome)     Myocardial infarction     Ulcerative colitis      Family History   Problem Relation Age of Onset    Colon cancer Mother     Heart attack Father     Heart attack Sister     Diabetes Sister     Breast cancer Sister      Social History     Tobacco Use    Smoking status: Former Smoker     Quit date: 5/8/2005     Years since quitting: 15.4    Smokeless tobacco: Never Used   Substance Use Topics    Alcohol use: No     Alcohol/week: 0.0 standard drinks    Drug use: No         Current Outpatient Medications on File Prior to Visit   Medication Sig Dispense Refill    aspirin (ECOTRIN) 81 MG EC tablet aspirin low dose 81mg ec      blood sugar diagnostic Strp 1 strip by Misc.(Non-Drug; Combo Route) route 4 (four) times daily. 150 each 11    cartilage/collagen II/hyaluron (MOVE FREE ULTRA ORAL) Take 1 tablet by  "mouth.      cholecalciferol, vitamin D3, (VITAMIN D3) 10 mcg (400 unit) Tab Take 1 capsule by mouth.      co-enzyme Q-10 30 mg capsule Take 100 mg by mouth once daily.      DULoxetine (CYMBALTA) 60 MG capsule TK 1 C PO  capsule 2    furosemide (LASIX) 40 MG tablet Take 40 mg by mouth as needed.      glucosamine-chondroitin 500-400 mg tablet Take 1 tablet by mouth once daily.      INFUSION SET-INSULIN PUMP BODY MISC by Misc.(Non-Drug; Combo Route) route. VINNIE set to replace all insulin      insulin aspart U-100 (NOVOLOG) 100 unit/mL injection Inject 14 Units into the skin 3 (three) times daily before meals. 6 vial 3    insulin syringe-needle U-100 (BD INSULIN SYRINGE ULT-FINE II) 1 mL 31 gauge x 5/16 Syrg 1 applicator by Misc.(Non-Drug; Combo Route) route 4 (four) times daily. 400 each 3    krill-om3-dha-epa-om6-lip-astx (KRILL OIL, OMEGA 3 AND 6,) 1,500-165-67.5 mg Cap Take by mouth once daily.      lancets 31 gauge Misc 1 lancet by Misc.(Non-Drug; Combo Route) route 4 (four) times daily. 200 each 4    leflunomide (ARAVA) 10 MG Tab Take 1 tablet (10 mg total) by mouth once daily. 90 tablet 0    LEVEMIR U-100 INSULIN 100 unit/mL injection INJECT 75 UNITS INTO THE SKIN EVERY EVENING. 10 mL 1    levothyroxine (SYNTHROID) 88 MCG tablet       losartan (COZAAR) 50 MG tablet losartan 50 mg tablet      metoprolol succinate (TOPROL-XL) 50 MG 24 hr tablet       multivitamin (THERAGRAN) per tablet Take 1 tablet by mouth once daily.      pen needle, diabetic (BD ULTRA-FINE ALEX PEN NEEDLE) 32 gauge x 5/32" Ndle       aspirin 81 MG Chew Take 81 mg by mouth once daily.      diphenoxylate-atropine 2.5-0.025 mg (LOMOTIL) 2.5-0.025 mg per tablet   1    dulaglutide (TRULICITY) 1.5 mg/0.5 mL PnIj Inject 1.5 mg into the skin.      gabapentin (NEURONTIN) 300 MG capsule Take 1 capsule (300 mg total) by mouth 2 (two) times daily. 180 capsule 3    hydrocodone-acetaminophen 5-325mg (NORCO) 5-325 mg per tablet " Take 1 tablet by mouth nightly as needed for Pain.      mirtazapine (REMERON) 15 MG tablet Take 15 mg by mouth.      nitroGLYCERIN (NITROSTAT) 0.4 MG SL tablet PLACE ONE TABLET UNDER THE TONGUE EVERY 5 MINUTES as needed for chest pain  1    pravastatin (PRAVACHOL) 20 MG tablet Take 20 mg by mouth once daily.      TRULICITY 1.5 mg/0.5 mL PnIj INJECT ONE pen SUBCUTANEOUSLY once a week  6     No current facility-administered medications on file prior to visit.        Vitals:    09/30/20 1027   BP: 116/76   Pulse: 65       Physical Exam:    Physical Exam   Constitutional: She appears well-developed and well-nourished.   HENT:   Nose: No septal deviation.   Mouth/Throat: Mucous membranes are normal. No oral lesions.   Eyes: Pupils are equal, round, and reactive to light. Right conjunctiva is not injected. Left conjunctiva is not injected.   Neck: No JVD present. No thyroid mass and no thyromegaly present.   Cardiovascular: Normal rate, regular rhythm and normal pulses.   No edema   Pulmonary/Chest: Effort normal and breath sounds normal.   Abdominal: Soft. Normal appearance. There is no hepatosplenomegaly.   Musculoskeletal:        Right shoulder: She exhibits normal range of motion, no tenderness and no swelling.        Left shoulder: She exhibits normal range of motion, no tenderness and no swelling.        Right elbow: She exhibits normal range of motion and no swelling. No tenderness found.        Left elbow: She exhibits normal range of motion and no swelling. No tenderness found.        Right wrist: She exhibits normal range of motion, no tenderness and no swelling.        Left wrist: She exhibits normal range of motion, no tenderness and no swelling.        Right hip: She exhibits normal range of motion, normal strength and no swelling.        Left hip: She exhibits normal range of motion, no tenderness and no swelling.        Right knee: She exhibits normal range of motion and no swelling. No tenderness  found.        Left knee: She exhibits normal range of motion and no swelling. No tenderness found.        Right ankle: She exhibits normal range of motion and no swelling. No tenderness.        Left ankle: She exhibits normal range of motion and no swelling. No tenderness.        Right hand: She exhibits decreased range of motion, tenderness and deformity.        Left hand: She exhibits decreased range of motion, tenderness and deformity.        Left foot: There is tenderness and swelling.   Patient is a DIP bony hypertrophy with deformity particular the second DIP bilaterally.  She has slight erythema at the third fourth and fifth right DIP she's difficulty with finger curl  strength reduced . no active synovitis in the wrist MCPs or PIPs.    +++tenderness at the left ischial bursa.    Lymphadenopathy:     She has no cervical adenopathy.     She has no axillary adenopathy.   Neurological: She has normal strength and normal reflexes.   Skin: Skin is dry and intact.   Psychiatric: She has a normal mood and affect.             Assessment:       Encounter Diagnoses   Name Primary?    Inflammatory arthritis Yes    Psoriatic arthritis     Immunosuppression           Plan:        Inflammatory arthritis    Psoriatic arthritis  -     CBC auto differential; Standing; Expected date: 09/30/2020  -     Comprehensive metabolic panel; Standing; Expected date: 09/30/2020  -     Sedimentation rate; Standing; Expected date: 09/30/2020  -     C-Reactive Protein; Standing; Expected date: 09/30/2020    Immunosuppression  -     CBC auto differential; Standing; Expected date: 09/30/2020  -     Comprehensive metabolic panel; Standing; Expected date: 09/30/2020  -     Sedimentation rate; Standing; Expected date: 09/30/2020  -     C-Reactive Protein; Standing; Expected date: 09/30/2020     PsA with EOA: chronically   LFA 10 mg daily (no complications to date) due for labs.   Continue-Hydroxychloroquine 200mg BID     Receiving from  Psych               - Duloxetine (Cymbalta) 60mg daily due to insurance cost has been off.               -Continue Gabapentin 300mg BID     Labs every 3 months last 8/2020       with recent breast cancer undergoing tx.   No follow-ups on file.          30min consultation with greater than 50% spent in counseling, chart review and coordination of care. All questions answered.

## 2020-10-22 DIAGNOSIS — M19.90 INFLAMMATORY ARTHRITIS: ICD-10-CM

## 2020-10-22 DIAGNOSIS — L40.50 PSORIATIC ARTHRITIS: ICD-10-CM

## 2020-10-22 NOTE — TELEPHONE ENCOUNTER
----- Message from Brandi Joseph, Patient Care Assistant sent at 10/22/2020  9:53 AM CDT -----  Regarding: refill  Contact: patient  Type:  RX Refill Request    Who Called:  patient  Refill or New Rx:  refill  RX Name and Strength:  leflunomide (ARAVA) 10 MG Tab  How is the patient currently taking it? 1XDay    Is this a 30 day or 90 day RX:  90  Preferred Pharmacy with phone number:    [facility] [address] [facility] [place] [facility] [address] [facility]  [address] [address]  Phone: [phone] Fax: [phone]  Local or Mail Order:  local  Ordering Provider:  Dr. Ismael Johnson Call Back Number:  [phone] (home)   Additional Information:  please call patient to advice! Thanks

## 2020-10-26 RX ORDER — LEFLUNOMIDE 10 MG/1
10 TABLET ORAL DAILY
Qty: 90 TABLET | Refills: 3 | Status: SHIPPED | OUTPATIENT
Start: 2020-10-26 | End: 2021-01-27 | Stop reason: SDUPTHER

## 2020-11-13 ENCOUNTER — LAB VISIT (OUTPATIENT)
Dept: LAB | Facility: HOSPITAL | Age: 70
End: 2020-11-13
Attending: INTERNAL MEDICINE
Payer: MEDICARE

## 2020-11-13 DIAGNOSIS — M19.90 INFLAMMATORY ARTHRITIS: ICD-10-CM

## 2020-11-13 DIAGNOSIS — D84.9 IMMUNOSUPPRESSION: ICD-10-CM

## 2020-11-13 LAB
ALBUMIN SERPL BCP-MCNC: 3.6 G/DL (ref 3.5–5.2)
ALP SERPL-CCNC: 85 U/L (ref 55–135)
ALT SERPL W/O P-5'-P-CCNC: 23 U/L (ref 10–44)
ANION GAP SERPL CALC-SCNC: 9 MMOL/L (ref 8–16)
AST SERPL-CCNC: 29 U/L (ref 10–40)
BASOPHILS # BLD AUTO: 0.1 K/UL (ref 0–0.2)
BASOPHILS NFR BLD: 1.8 % (ref 0–1.9)
BILIRUB SERPL-MCNC: 0.6 MG/DL (ref 0.1–1)
BUN SERPL-MCNC: 18 MG/DL (ref 8–23)
CALCIUM SERPL-MCNC: 9.4 MG/DL (ref 8.7–10.5)
CHLORIDE SERPL-SCNC: 104 MMOL/L (ref 95–110)
CO2 SERPL-SCNC: 26 MMOL/L (ref 23–29)
CREAT SERPL-MCNC: 0.8 MG/DL (ref 0.5–1.4)
CRP SERPL-MCNC: 0.9 MG/L (ref 0–8.2)
DIFFERENTIAL METHOD: ABNORMAL
EOSINOPHIL # BLD AUTO: 0.2 K/UL (ref 0–0.5)
EOSINOPHIL NFR BLD: 2.7 % (ref 0–8)
ERYTHROCYTE [DISTWIDTH] IN BLOOD BY AUTOMATED COUNT: 13 % (ref 11.5–14.5)
ERYTHROCYTE [SEDIMENTATION RATE] IN BLOOD BY WESTERGREN METHOD: 27 MM/HR (ref 0–20)
EST. GFR  (AFRICAN AMERICAN): >60 ML/MIN/1.73 M^2
EST. GFR  (NON AFRICAN AMERICAN): >60 ML/MIN/1.73 M^2
GLUCOSE SERPL-MCNC: 95 MG/DL (ref 70–110)
HCT VFR BLD AUTO: 42.7 % (ref 37–48.5)
HGB BLD-MCNC: 13.6 G/DL (ref 12–16)
IMM GRANULOCYTES # BLD AUTO: 0.02 K/UL (ref 0–0.04)
IMM GRANULOCYTES NFR BLD AUTO: 0.4 % (ref 0–0.5)
LYMPHOCYTES # BLD AUTO: 2.4 K/UL (ref 1–4.8)
LYMPHOCYTES NFR BLD: 42.7 % (ref 18–48)
MCH RBC QN AUTO: 30 PG (ref 27–31)
MCHC RBC AUTO-ENTMCNC: 31.9 G/DL (ref 32–36)
MCV RBC AUTO: 94 FL (ref 82–98)
MONOCYTES # BLD AUTO: 0.8 K/UL (ref 0.3–1)
MONOCYTES NFR BLD: 14.9 % (ref 4–15)
NEUTROPHILS # BLD AUTO: 2.1 K/UL (ref 1.8–7.7)
NEUTROPHILS NFR BLD: 37.5 % (ref 38–73)
NRBC BLD-RTO: 0 /100 WBC
PLATELET # BLD AUTO: 257 K/UL (ref 150–350)
PMV BLD AUTO: 11.6 FL (ref 9.2–12.9)
POTASSIUM SERPL-SCNC: 4.3 MMOL/L (ref 3.5–5.1)
PROT SERPL-MCNC: 7.3 G/DL (ref 6–8.4)
RBC # BLD AUTO: 4.53 M/UL (ref 4–5.4)
SODIUM SERPL-SCNC: 139 MMOL/L (ref 136–145)
WBC # BLD AUTO: 5.65 K/UL (ref 3.9–12.7)

## 2020-11-13 PROCEDURE — 85025 COMPLETE CBC W/AUTO DIFF WBC: CPT

## 2020-11-13 PROCEDURE — 80053 COMPREHEN METABOLIC PANEL: CPT

## 2020-11-13 PROCEDURE — 36415 COLL VENOUS BLD VENIPUNCTURE: CPT | Mod: PO

## 2020-11-13 PROCEDURE — 86140 C-REACTIVE PROTEIN: CPT

## 2020-11-13 PROCEDURE — 85651 RBC SED RATE NONAUTOMATED: CPT | Mod: PO

## 2020-11-21 NOTE — PROGRESS NOTES
Your labs look fine. Sed rate (marker of inflammation) was a little elevated but the other marker C-reactive protein was normal.   Dr. Guevara-Rheumatology

## 2021-01-21 ENCOUNTER — TELEPHONE (OUTPATIENT)
Dept: RHEUMATOLOGY | Facility: CLINIC | Age: 71
End: 2021-01-21

## 2021-01-27 DIAGNOSIS — L40.50 PSORIATIC ARTHRITIS: ICD-10-CM

## 2021-01-27 DIAGNOSIS — M19.90 INFLAMMATORY ARTHRITIS: ICD-10-CM

## 2021-02-01 RX ORDER — LEFLUNOMIDE 10 MG/1
10 TABLET ORAL DAILY
Qty: 90 TABLET | Refills: 3 | Status: SHIPPED | OUTPATIENT
Start: 2021-02-01 | End: 2022-04-04

## 2021-02-04 ENCOUNTER — LAB VISIT (OUTPATIENT)
Dept: LAB | Facility: HOSPITAL | Age: 71
End: 2021-02-04
Attending: INTERNAL MEDICINE
Payer: MEDICARE

## 2021-02-04 DIAGNOSIS — L40.50 PSORIATIC ARTHRITIS: ICD-10-CM

## 2021-02-04 DIAGNOSIS — D84.9 IMMUNOSUPPRESSION: ICD-10-CM

## 2021-02-04 LAB
BASOPHILS # BLD AUTO: 0.08 K/UL (ref 0–0.2)
BASOPHILS NFR BLD: 1.5 % (ref 0–1.9)
DIFFERENTIAL METHOD: ABNORMAL
EOSINOPHIL # BLD AUTO: 0.2 K/UL (ref 0–0.5)
EOSINOPHIL NFR BLD: 2.9 % (ref 0–8)
ERYTHROCYTE [DISTWIDTH] IN BLOOD BY AUTOMATED COUNT: 13.3 % (ref 11.5–14.5)
ERYTHROCYTE [SEDIMENTATION RATE] IN BLOOD BY WESTERGREN METHOD: 7 MM/HR (ref 0–20)
HCT VFR BLD AUTO: 47 % (ref 37–48.5)
HGB BLD-MCNC: 14.8 G/DL (ref 12–16)
IMM GRANULOCYTES # BLD AUTO: 0.01 K/UL (ref 0–0.04)
IMM GRANULOCYTES NFR BLD AUTO: 0.2 % (ref 0–0.5)
LYMPHOCYTES # BLD AUTO: 1.8 K/UL (ref 1–4.8)
LYMPHOCYTES NFR BLD: 32.7 % (ref 18–48)
MCH RBC QN AUTO: 30 PG (ref 27–31)
MCHC RBC AUTO-ENTMCNC: 31.5 G/DL (ref 32–36)
MCV RBC AUTO: 95 FL (ref 82–98)
MONOCYTES # BLD AUTO: 0.9 K/UL (ref 0.3–1)
MONOCYTES NFR BLD: 16.3 % (ref 4–15)
NEUTROPHILS # BLD AUTO: 2.5 K/UL (ref 1.8–7.7)
NEUTROPHILS NFR BLD: 46.4 % (ref 38–73)
NRBC BLD-RTO: 0 /100 WBC
PLATELET # BLD AUTO: 285 K/UL (ref 150–350)
PMV BLD AUTO: 11.1 FL (ref 9.2–12.9)
RBC # BLD AUTO: 4.94 M/UL (ref 4–5.4)
WBC # BLD AUTO: 5.47 K/UL (ref 3.9–12.7)

## 2021-02-04 PROCEDURE — 85651 RBC SED RATE NONAUTOMATED: CPT | Mod: PO

## 2021-02-04 PROCEDURE — 85025 COMPLETE CBC W/AUTO DIFF WBC: CPT

## 2021-02-04 PROCEDURE — 80053 COMPREHEN METABOLIC PANEL: CPT

## 2021-02-04 PROCEDURE — 86140 C-REACTIVE PROTEIN: CPT

## 2021-02-04 PROCEDURE — 36415 COLL VENOUS BLD VENIPUNCTURE: CPT | Mod: PO

## 2021-02-05 LAB
ALBUMIN SERPL BCP-MCNC: 3.8 G/DL (ref 3.5–5.2)
ALP SERPL-CCNC: 72 U/L (ref 55–135)
ALT SERPL W/O P-5'-P-CCNC: 22 U/L (ref 10–44)
ANION GAP SERPL CALC-SCNC: 11 MMOL/L (ref 8–16)
AST SERPL-CCNC: 28 U/L (ref 10–40)
BILIRUB SERPL-MCNC: 0.5 MG/DL (ref 0.1–1)
BUN SERPL-MCNC: 18 MG/DL (ref 8–23)
CALCIUM SERPL-MCNC: 9 MG/DL (ref 8.7–10.5)
CHLORIDE SERPL-SCNC: 102 MMOL/L (ref 95–110)
CO2 SERPL-SCNC: 26 MMOL/L (ref 23–29)
CREAT SERPL-MCNC: 0.9 MG/DL (ref 0.5–1.4)
CRP SERPL-MCNC: 0.6 MG/L (ref 0–8.2)
EST. GFR  (AFRICAN AMERICAN): >60 ML/MIN/1.73 M^2
EST. GFR  (NON AFRICAN AMERICAN): >60 ML/MIN/1.73 M^2
GLUCOSE SERPL-MCNC: 67 MG/DL (ref 70–110)
POTASSIUM SERPL-SCNC: 4.4 MMOL/L (ref 3.5–5.1)
PROT SERPL-MCNC: 7.6 G/DL (ref 6–8.4)
SODIUM SERPL-SCNC: 139 MMOL/L (ref 136–145)

## 2021-03-15 ENCOUNTER — OFFICE VISIT (OUTPATIENT)
Dept: RHEUMATOLOGY | Facility: CLINIC | Age: 71
End: 2021-03-15
Payer: MEDICARE

## 2021-03-15 VITALS
HEIGHT: 65 IN | DIASTOLIC BLOOD PRESSURE: 71 MMHG | WEIGHT: 190.25 LBS | BODY MASS INDEX: 31.7 KG/M2 | SYSTOLIC BLOOD PRESSURE: 136 MMHG | HEART RATE: 74 BPM

## 2021-03-15 DIAGNOSIS — L40.50 PSORIATIC ARTHRITIS: ICD-10-CM

## 2021-03-15 DIAGNOSIS — M19.90 INFLAMMATORY ARTHRITIS: Primary | ICD-10-CM

## 2021-03-15 PROCEDURE — 99214 OFFICE O/P EST MOD 30 MIN: CPT | Mod: PBBFAC,PO | Performed by: INTERNAL MEDICINE

## 2021-03-15 PROCEDURE — 99999 PR PBB SHADOW E&M-EST. PATIENT-LVL IV: ICD-10-PCS | Mod: PBBFAC,,, | Performed by: INTERNAL MEDICINE

## 2021-03-15 PROCEDURE — 99214 PR OFFICE/OUTPT VISIT, EST, LEVL IV, 30-39 MIN: ICD-10-PCS | Mod: S$PBB,,, | Performed by: INTERNAL MEDICINE

## 2021-03-15 PROCEDURE — 99999 PR PBB SHADOW E&M-EST. PATIENT-LVL IV: CPT | Mod: PBBFAC,,, | Performed by: INTERNAL MEDICINE

## 2021-03-15 PROCEDURE — 99214 OFFICE O/P EST MOD 30 MIN: CPT | Mod: S$PBB,,, | Performed by: INTERNAL MEDICINE

## 2021-03-15 RX ORDER — RIFAXIMIN 550 MG/1
TABLET ORAL
COMMUNITY
Start: 2021-03-11

## 2021-03-15 RX ORDER — DICLOFENAC SODIUM 10 MG/G
GEL TOPICAL
COMMUNITY

## 2021-03-15 RX ORDER — TRAZODONE HYDROCHLORIDE 100 MG/1
100 TABLET ORAL NIGHTLY
COMMUNITY
Start: 2020-12-21

## 2021-03-15 RX ORDER — NABUMETONE 500 MG/1
TABLET, FILM COATED ORAL
COMMUNITY
Start: 2021-02-23

## 2021-03-15 ASSESSMENT — ROUTINE ASSESSMENT OF PATIENT INDEX DATA (RAPID3)
PATIENT GLOBAL ASSESSMENT SCORE: 2.5
PSYCHOLOGICAL DISTRESS SCORE: 2.2
TOTAL RAPID3 SCORE: 2.44
PAIN SCORE: 2.5
FATIGUE SCORE: 1.1
MDHAQ FUNCTION SCORE: 0.7

## 2021-03-31 DIAGNOSIS — M19.90 INFLAMMATORY ARTHRITIS: ICD-10-CM

## 2021-03-31 DIAGNOSIS — L40.50 PSORIATIC ARTHRITIS: ICD-10-CM

## 2021-04-01 RX ORDER — HYDROXYCHLOROQUINE SULFATE 200 MG/1
200 TABLET, FILM COATED ORAL 2 TIMES DAILY
Qty: 180 TABLET | Refills: 3 | Status: SHIPPED | OUTPATIENT
Start: 2021-04-01 | End: 2022-04-25

## 2021-06-01 ENCOUNTER — LAB VISIT (OUTPATIENT)
Dept: LAB | Facility: HOSPITAL | Age: 71
End: 2021-06-01
Attending: ORTHOPAEDIC SURGERY
Payer: MEDICARE

## 2021-06-01 DIAGNOSIS — G56.02 CARPAL TUNNEL SYNDROME ON LEFT: ICD-10-CM

## 2021-06-01 LAB
CCP AB SER IA-ACNC: <0.5 U/ML
ERYTHROCYTE [SEDIMENTATION RATE] IN BLOOD BY WESTERGREN METHOD: 26 MM/HR (ref 0–20)
RHEUMATOID FACT SERPL-ACNC: <10 IU/ML (ref 0–15)

## 2021-06-01 PROCEDURE — 86140 C-REACTIVE PROTEIN: CPT | Performed by: ORTHOPAEDIC SURGERY

## 2021-06-01 PROCEDURE — 36415 COLL VENOUS BLD VENIPUNCTURE: CPT | Mod: PO | Performed by: ORTHOPAEDIC SURGERY

## 2021-06-01 PROCEDURE — 86431 RHEUMATOID FACTOR QUANT: CPT | Performed by: ORTHOPAEDIC SURGERY

## 2021-06-01 PROCEDURE — 86200 CCP ANTIBODY: CPT | Performed by: ORTHOPAEDIC SURGERY

## 2021-06-01 PROCEDURE — 84550 ASSAY OF BLOOD/URIC ACID: CPT | Performed by: ORTHOPAEDIC SURGERY

## 2021-06-01 PROCEDURE — 86038 ANTINUCLEAR ANTIBODIES: CPT | Performed by: ORTHOPAEDIC SURGERY

## 2021-06-01 PROCEDURE — 85651 RBC SED RATE NONAUTOMATED: CPT | Mod: PO | Performed by: ORTHOPAEDIC SURGERY

## 2021-06-02 LAB
ANA SER QL IF: NORMAL
CRP SERPL-MCNC: 1.7 MG/L (ref 0–8.2)
URATE SERPL-MCNC: 6.4 MG/DL (ref 2.4–5.7)

## 2021-07-26 ENCOUNTER — LAB VISIT (OUTPATIENT)
Dept: LAB | Facility: HOSPITAL | Age: 71
End: 2021-07-26
Attending: INTERNAL MEDICINE
Payer: MEDICARE

## 2021-07-26 DIAGNOSIS — M19.90 INFLAMMATORY ARTHRITIS: ICD-10-CM

## 2021-07-26 DIAGNOSIS — L40.50 PSORIATIC ARTHRITIS: ICD-10-CM

## 2021-07-26 LAB
ALBUMIN SERPL BCP-MCNC: 3.5 G/DL (ref 3.5–5.2)
ALP SERPL-CCNC: 72 U/L (ref 55–135)
ALT SERPL W/O P-5'-P-CCNC: 30 U/L (ref 10–44)
ANION GAP SERPL CALC-SCNC: 12 MMOL/L (ref 8–16)
AST SERPL-CCNC: 31 U/L (ref 10–40)
BASOPHILS # BLD AUTO: 0.07 K/UL (ref 0–0.2)
BASOPHILS NFR BLD: 1.1 % (ref 0–1.9)
BILIRUB SERPL-MCNC: 0.5 MG/DL (ref 0.1–1)
BUN SERPL-MCNC: 15 MG/DL (ref 8–23)
CALCIUM SERPL-MCNC: 9.7 MG/DL (ref 8.7–10.5)
CHLORIDE SERPL-SCNC: 100 MMOL/L (ref 95–110)
CO2 SERPL-SCNC: 24 MMOL/L (ref 23–29)
CREAT SERPL-MCNC: 0.7 MG/DL (ref 0.5–1.4)
CRP SERPL-MCNC: 0.9 MG/L (ref 0–8.2)
DIFFERENTIAL METHOD: NORMAL
EOSINOPHIL # BLD AUTO: 0.3 K/UL (ref 0–0.5)
EOSINOPHIL NFR BLD: 3.9 % (ref 0–8)
ERYTHROCYTE [DISTWIDTH] IN BLOOD BY AUTOMATED COUNT: 13.6 % (ref 11.5–14.5)
ERYTHROCYTE [SEDIMENTATION RATE] IN BLOOD BY WESTERGREN METHOD: 23 MM/HR (ref 0–20)
EST. GFR  (AFRICAN AMERICAN): >60 ML/MIN/1.73 M^2
EST. GFR  (NON AFRICAN AMERICAN): >60 ML/MIN/1.73 M^2
GLUCOSE SERPL-MCNC: 58 MG/DL (ref 70–110)
HCT VFR BLD AUTO: 39.4 % (ref 37–48.5)
HGB BLD-MCNC: 12.6 G/DL (ref 12–16)
IMM GRANULOCYTES # BLD AUTO: 0.02 K/UL (ref 0–0.04)
IMM GRANULOCYTES NFR BLD AUTO: 0.3 % (ref 0–0.5)
LYMPHOCYTES # BLD AUTO: 1.9 K/UL (ref 1–4.8)
LYMPHOCYTES NFR BLD: 29.7 % (ref 18–48)
MCH RBC QN AUTO: 30.2 PG (ref 27–31)
MCHC RBC AUTO-ENTMCNC: 32 G/DL (ref 32–36)
MCV RBC AUTO: 95 FL (ref 82–98)
MONOCYTES # BLD AUTO: 0.7 K/UL (ref 0.3–1)
MONOCYTES NFR BLD: 10.1 % (ref 4–15)
NEUTROPHILS # BLD AUTO: 3.6 K/UL (ref 1.8–7.7)
NEUTROPHILS NFR BLD: 54.9 % (ref 38–73)
NRBC BLD-RTO: 0 /100 WBC
PLATELET # BLD AUTO: 264 K/UL (ref 150–450)
PMV BLD AUTO: 10.9 FL (ref 9.2–12.9)
POTASSIUM SERPL-SCNC: 4.2 MMOL/L (ref 3.5–5.1)
PROT SERPL-MCNC: 7.1 G/DL (ref 6–8.4)
RBC # BLD AUTO: 4.17 M/UL (ref 4–5.4)
SODIUM SERPL-SCNC: 136 MMOL/L (ref 136–145)
WBC # BLD AUTO: 6.46 K/UL (ref 3.9–12.7)

## 2021-07-26 PROCEDURE — 85651 RBC SED RATE NONAUTOMATED: CPT | Mod: PO | Performed by: INTERNAL MEDICINE

## 2021-07-26 PROCEDURE — 80053 COMPREHEN METABOLIC PANEL: CPT | Performed by: INTERNAL MEDICINE

## 2021-07-26 PROCEDURE — 86140 C-REACTIVE PROTEIN: CPT | Performed by: INTERNAL MEDICINE

## 2021-07-26 PROCEDURE — 85025 COMPLETE CBC W/AUTO DIFF WBC: CPT | Performed by: INTERNAL MEDICINE

## 2021-07-26 PROCEDURE — 36415 COLL VENOUS BLD VENIPUNCTURE: CPT | Mod: PO | Performed by: INTERNAL MEDICINE

## 2021-07-29 ENCOUNTER — LAB VISIT (OUTPATIENT)
Dept: LAB | Facility: HOSPITAL | Age: 71
End: 2021-07-29
Attending: INTERNAL MEDICINE
Payer: MEDICARE

## 2021-07-29 ENCOUNTER — OFFICE VISIT (OUTPATIENT)
Dept: RHEUMATOLOGY | Facility: CLINIC | Age: 71
End: 2021-07-29
Payer: MEDICARE

## 2021-07-29 VITALS
WEIGHT: 201.38 LBS | HEIGHT: 65 IN | SYSTOLIC BLOOD PRESSURE: 159 MMHG | BODY MASS INDEX: 33.55 KG/M2 | DIASTOLIC BLOOD PRESSURE: 64 MMHG | HEART RATE: 76 BPM

## 2021-07-29 DIAGNOSIS — R53.83 MALAISE AND FATIGUE: ICD-10-CM

## 2021-07-29 DIAGNOSIS — R53.81 MALAISE AND FATIGUE: ICD-10-CM

## 2021-07-29 DIAGNOSIS — M15.4 EROSIVE OSTEOARTHRITIS OF BOTH HANDS: ICD-10-CM

## 2021-07-29 DIAGNOSIS — L40.50 PSORIATIC ARTHRITIS: Primary | ICD-10-CM

## 2021-07-29 PROCEDURE — 99215 OFFICE O/P EST HI 40 MIN: CPT | Mod: S$PBB,,, | Performed by: INTERNAL MEDICINE

## 2021-07-29 PROCEDURE — 99215 OFFICE O/P EST HI 40 MIN: CPT | Mod: PBBFAC,PN,25 | Performed by: INTERNAL MEDICINE

## 2021-07-29 PROCEDURE — 99999 PR PBB SHADOW E&M-EST. PATIENT-LVL V: ICD-10-PCS | Mod: PBBFAC,,, | Performed by: INTERNAL MEDICINE

## 2021-07-29 PROCEDURE — 99215 PR OFFICE/OUTPT VISIT, EST, LEVL V, 40-54 MIN: ICD-10-PCS | Mod: S$PBB,,, | Performed by: INTERNAL MEDICINE

## 2021-07-29 PROCEDURE — 96372 THER/PROPH/DIAG INJ SC/IM: CPT | Mod: PBBFAC,PN

## 2021-07-29 PROCEDURE — 99999 PR PBB SHADOW E&M-EST. PATIENT-LVL V: CPT | Mod: PBBFAC,,, | Performed by: INTERNAL MEDICINE

## 2021-07-29 PROCEDURE — 80074 ACUTE HEPATITIS PANEL: CPT | Performed by: INTERNAL MEDICINE

## 2021-07-29 RX ORDER — CHOLESTYRAMINE 4 G/9G
POWDER, FOR SUSPENSION ORAL
COMMUNITY
Start: 2021-06-11

## 2021-07-29 RX ORDER — ETANERCEPT 50 MG/ML
50 SOLUTION SUBCUTANEOUS WEEKLY
Qty: 4 SYRINGE | Refills: 11 | Status: SHIPPED | OUTPATIENT
Start: 2021-07-29 | End: 2023-01-23 | Stop reason: SDUPTHER

## 2021-07-29 RX ORDER — INSULIN GLARGINE 100 [IU]/ML
INJECTION, SOLUTION SUBCUTANEOUS
COMMUNITY
Start: 2021-04-22

## 2021-07-29 RX ORDER — METHYLPREDNISOLONE ACETATE 40 MG/ML
40 INJECTION, SUSPENSION INTRA-ARTICULAR; INTRALESIONAL; INTRAMUSCULAR; SOFT TISSUE
Status: COMPLETED | OUTPATIENT
Start: 2021-07-29 | End: 2021-07-29

## 2021-07-29 RX ADMIN — METHYLPREDNISOLONE ACETATE 40 MG: 40 INJECTION, SUSPENSION INTRA-ARTICULAR; INTRALESIONAL; INTRAMUSCULAR; SOFT TISSUE at 03:07

## 2021-07-29 ASSESSMENT — ROUTINE ASSESSMENT OF PATIENT INDEX DATA (RAPID3)
FATIGUE SCORE: 5
MDHAQ FUNCTION SCORE: 0.6
PATIENT GLOBAL ASSESSMENT SCORE: 5
PAIN SCORE: 6
PSYCHOLOGICAL DISTRESS SCORE: 3.3
TOTAL RAPID3 SCORE: 4.33

## 2021-07-30 ENCOUNTER — SPECIALTY PHARMACY (OUTPATIENT)
Dept: PHARMACY | Facility: CLINIC | Age: 71
End: 2021-07-30

## 2021-07-30 LAB
HAV IGM SERPL QL IA: NEGATIVE
HBV CORE IGM SERPL QL IA: NEGATIVE
HBV SURFACE AG SERPL QL IA: NEGATIVE
HCV AB SERPL QL IA: NEGATIVE

## 2021-08-17 ENCOUNTER — PATIENT MESSAGE (OUTPATIENT)
Dept: RHEUMATOLOGY | Facility: CLINIC | Age: 71
End: 2021-08-17

## 2021-09-23 ENCOUNTER — CLINICAL SUPPORT (OUTPATIENT)
Dept: RHEUMATOLOGY | Facility: CLINIC | Age: 71
End: 2021-09-23
Payer: MEDICARE

## 2021-09-23 DIAGNOSIS — M19.90 INFLAMMATORY ARTHRITIS: ICD-10-CM

## 2021-09-23 DIAGNOSIS — L40.50 PSORIATIC ARTHRITIS: Primary | ICD-10-CM

## 2021-09-23 PROCEDURE — 99999 PR PBB SHADOW E&M-EST. PATIENT-LVL I: ICD-10-PCS | Mod: PBBFAC,,,

## 2021-09-23 PROCEDURE — 99211 OFF/OP EST MAY X REQ PHY/QHP: CPT | Mod: PBBFAC,25,PN

## 2021-09-23 PROCEDURE — 99999 PR PBB SHADOW E&M-EST. PATIENT-LVL I: CPT | Mod: PBBFAC,,,

## 2021-09-23 PROCEDURE — 96372 THER/PROPH/DIAG INJ SC/IM: CPT | Mod: PBBFAC,PN

## 2021-09-23 RX ORDER — METHYLPREDNISOLONE ACETATE 40 MG/ML
40 INJECTION, SUSPENSION INTRA-ARTICULAR; INTRALESIONAL; INTRAMUSCULAR; SOFT TISSUE
Status: COMPLETED | OUTPATIENT
Start: 2021-09-23 | End: 2021-09-23

## 2021-09-23 RX ADMIN — METHYLPREDNISOLONE ACETATE 40 MG: 40 INJECTION, SUSPENSION INTRA-ARTICULAR; INTRALESIONAL; INTRAMUSCULAR; INTRASYNOVIAL; SOFT TISSUE at 10:09

## 2021-12-01 ENCOUNTER — OFFICE VISIT (OUTPATIENT)
Dept: RHEUMATOLOGY | Facility: CLINIC | Age: 71
End: 2021-12-01
Payer: MEDICARE

## 2021-12-01 VITALS
WEIGHT: 206.88 LBS | DIASTOLIC BLOOD PRESSURE: 65 MMHG | SYSTOLIC BLOOD PRESSURE: 145 MMHG | HEART RATE: 76 BPM | HEIGHT: 65 IN | BODY MASS INDEX: 34.47 KG/M2

## 2021-12-01 DIAGNOSIS — L40.50 PSORIATIC ARTHRITIS: ICD-10-CM

## 2021-12-01 DIAGNOSIS — M79.7 FIBROMYALGIA: ICD-10-CM

## 2021-12-01 DIAGNOSIS — M19.90 INFLAMMATORY ARTHRITIS: Primary | ICD-10-CM

## 2021-12-01 DIAGNOSIS — D84.9 IMMUNOSUPPRESSION: ICD-10-CM

## 2021-12-01 PROCEDURE — 99999 PR PBB SHADOW E&M-EST. PATIENT-LVL V: CPT | Mod: PBBFAC,,, | Performed by: INTERNAL MEDICINE

## 2021-12-01 PROCEDURE — 99215 OFFICE O/P EST HI 40 MIN: CPT | Mod: PBBFAC,PN | Performed by: INTERNAL MEDICINE

## 2021-12-01 PROCEDURE — 99215 OFFICE O/P EST HI 40 MIN: CPT | Mod: S$PBB,,, | Performed by: INTERNAL MEDICINE

## 2021-12-01 PROCEDURE — 99999 PR PBB SHADOW E&M-EST. PATIENT-LVL V: ICD-10-PCS | Mod: PBBFAC,,, | Performed by: INTERNAL MEDICINE

## 2021-12-01 PROCEDURE — 99215 PR OFFICE/OUTPT VISIT, EST, LEVL V, 40-54 MIN: ICD-10-PCS | Mod: S$PBB,,, | Performed by: INTERNAL MEDICINE

## 2021-12-01 RX ORDER — ALLOPURINOL 100 MG/1
TABLET ORAL
COMMUNITY
Start: 2021-09-17

## 2021-12-01 ASSESSMENT — ROUTINE ASSESSMENT OF PATIENT INDEX DATA (RAPID3)
TOTAL RAPID3 SCORE: 3.11
PSYCHOLOGICAL DISTRESS SCORE: 2.2
PATIENT GLOBAL ASSESSMENT SCORE: 2
MDHAQ FUNCTION SCORE: 1
PAIN SCORE: 4
FATIGUE SCORE: 2.2

## 2022-01-25 ENCOUNTER — LAB VISIT (OUTPATIENT)
Dept: LAB | Facility: HOSPITAL | Age: 72
End: 2022-01-25
Attending: INTERNAL MEDICINE
Payer: MEDICARE

## 2022-01-25 DIAGNOSIS — M19.90 INFLAMMATORY ARTHRITIS: ICD-10-CM

## 2022-01-25 DIAGNOSIS — L40.50 PSORIATIC ARTHRITIS: ICD-10-CM

## 2022-01-25 DIAGNOSIS — D84.9 IMMUNOSUPPRESSION: ICD-10-CM

## 2022-01-25 LAB
ALBUMIN SERPL BCP-MCNC: 3.6 G/DL (ref 3.5–5.2)
ALP SERPL-CCNC: 70 U/L (ref 55–135)
ALT SERPL W/O P-5'-P-CCNC: 28 U/L (ref 10–44)
ANION GAP SERPL CALC-SCNC: 5 MMOL/L (ref 8–16)
AST SERPL-CCNC: 25 U/L (ref 10–40)
BASOPHILS # BLD AUTO: 0.07 K/UL (ref 0–0.2)
BASOPHILS NFR BLD: 1.1 % (ref 0–1.9)
BILIRUB SERPL-MCNC: 0.5 MG/DL (ref 0.1–1)
BUN SERPL-MCNC: 20 MG/DL (ref 8–23)
CALCIUM SERPL-MCNC: 9.4 MG/DL (ref 8.7–10.5)
CHLORIDE SERPL-SCNC: 104 MMOL/L (ref 95–110)
CO2 SERPL-SCNC: 29 MMOL/L (ref 23–29)
CREAT SERPL-MCNC: 0.8 MG/DL (ref 0.5–1.4)
CRP SERPL-MCNC: 0.4 MG/L (ref 0–8.2)
DIFFERENTIAL METHOD: ABNORMAL
EOSINOPHIL # BLD AUTO: 0.3 K/UL (ref 0–0.5)
EOSINOPHIL NFR BLD: 4.4 % (ref 0–8)
ERYTHROCYTE [DISTWIDTH] IN BLOOD BY AUTOMATED COUNT: 12.8 % (ref 11.5–14.5)
ERYTHROCYTE [SEDIMENTATION RATE] IN BLOOD BY WESTERGREN METHOD: 11 MM/HR (ref 0–20)
EST. GFR  (AFRICAN AMERICAN): >60 ML/MIN/1.73 M^2
EST. GFR  (NON AFRICAN AMERICAN): >60 ML/MIN/1.73 M^2
GLUCOSE SERPL-MCNC: 96 MG/DL (ref 70–110)
HCT VFR BLD AUTO: 42.5 % (ref 37–48.5)
HGB BLD-MCNC: 13.7 G/DL (ref 12–16)
IMM GRANULOCYTES # BLD AUTO: 0.01 K/UL (ref 0–0.04)
IMM GRANULOCYTES NFR BLD AUTO: 0.2 % (ref 0–0.5)
LYMPHOCYTES # BLD AUTO: 2.5 K/UL (ref 1–4.8)
LYMPHOCYTES NFR BLD: 39.4 % (ref 18–48)
MCH RBC QN AUTO: 31.6 PG (ref 27–31)
MCHC RBC AUTO-ENTMCNC: 32.2 G/DL (ref 32–36)
MCV RBC AUTO: 98 FL (ref 82–98)
MONOCYTES # BLD AUTO: 0.8 K/UL (ref 0.3–1)
MONOCYTES NFR BLD: 12.4 % (ref 4–15)
NEUTROPHILS # BLD AUTO: 2.7 K/UL (ref 1.8–7.7)
NEUTROPHILS NFR BLD: 42.5 % (ref 38–73)
NRBC BLD-RTO: 0 /100 WBC
PLATELET # BLD AUTO: 247 K/UL (ref 150–450)
PMV BLD AUTO: 11.2 FL (ref 9.2–12.9)
POTASSIUM SERPL-SCNC: 4.5 MMOL/L (ref 3.5–5.1)
PROT SERPL-MCNC: 7 G/DL (ref 6–8.4)
RBC # BLD AUTO: 4.34 M/UL (ref 4–5.4)
SODIUM SERPL-SCNC: 138 MMOL/L (ref 136–145)
WBC # BLD AUTO: 6.35 K/UL (ref 3.9–12.7)

## 2022-01-25 PROCEDURE — 86140 C-REACTIVE PROTEIN: CPT | Performed by: INTERNAL MEDICINE

## 2022-01-25 PROCEDURE — 36415 COLL VENOUS BLD VENIPUNCTURE: CPT | Mod: PO | Performed by: INTERNAL MEDICINE

## 2022-01-25 PROCEDURE — 85651 RBC SED RATE NONAUTOMATED: CPT | Mod: PO | Performed by: INTERNAL MEDICINE

## 2022-01-25 PROCEDURE — 85025 COMPLETE CBC W/AUTO DIFF WBC: CPT | Performed by: INTERNAL MEDICINE

## 2022-01-25 PROCEDURE — 80053 COMPREHEN METABOLIC PANEL: CPT | Performed by: INTERNAL MEDICINE

## 2022-03-10 ENCOUNTER — PATIENT MESSAGE (OUTPATIENT)
Dept: RHEUMATOLOGY | Facility: CLINIC | Age: 72
End: 2022-03-10
Payer: MEDICARE

## 2022-04-04 ENCOUNTER — OFFICE VISIT (OUTPATIENT)
Dept: RHEUMATOLOGY | Facility: CLINIC | Age: 72
End: 2022-04-04
Payer: MEDICARE

## 2022-04-04 VITALS
WEIGHT: 205.25 LBS | BODY MASS INDEX: 34.2 KG/M2 | DIASTOLIC BLOOD PRESSURE: 74 MMHG | SYSTOLIC BLOOD PRESSURE: 148 MMHG | HEART RATE: 70 BPM | HEIGHT: 65 IN

## 2022-04-04 DIAGNOSIS — M19.90 INFLAMMATORY ARTHRITIS: ICD-10-CM

## 2022-04-04 DIAGNOSIS — L40.50 PSORIATIC ARTHRITIS: Primary | ICD-10-CM

## 2022-04-04 DIAGNOSIS — D84.9 IMMUNOSUPPRESSION: ICD-10-CM

## 2022-04-04 PROCEDURE — 99214 OFFICE O/P EST MOD 30 MIN: CPT | Mod: PBBFAC,PN | Performed by: INTERNAL MEDICINE

## 2022-04-04 PROCEDURE — 99999 PR PBB SHADOW E&M-EST. PATIENT-LVL IV: ICD-10-PCS | Mod: PBBFAC,,, | Performed by: INTERNAL MEDICINE

## 2022-04-04 PROCEDURE — 99214 PR OFFICE/OUTPT VISIT, EST, LEVL IV, 30-39 MIN: ICD-10-PCS | Mod: S$PBB,,, | Performed by: INTERNAL MEDICINE

## 2022-04-04 PROCEDURE — 99214 OFFICE O/P EST MOD 30 MIN: CPT | Mod: S$PBB,,, | Performed by: INTERNAL MEDICINE

## 2022-04-04 PROCEDURE — 99999 PR PBB SHADOW E&M-EST. PATIENT-LVL IV: CPT | Mod: PBBFAC,,, | Performed by: INTERNAL MEDICINE

## 2022-04-04 RX ORDER — BUSPIRONE HYDROCHLORIDE 15 MG/1
TABLET ORAL
COMMUNITY
Start: 2022-01-24

## 2022-04-04 RX ORDER — LAMOTRIGINE 100 MG/1
100 TABLET ORAL
COMMUNITY
Start: 2022-01-24

## 2022-04-04 ASSESSMENT — ROUTINE ASSESSMENT OF PATIENT INDEX DATA (RAPID3)
PSYCHOLOGICAL DISTRESS SCORE: 2.2
FATIGUE SCORE: 2.2
PAIN SCORE: 3
TOTAL RAPID3 SCORE: 2.72
PATIENT GLOBAL ASSESSMENT SCORE: 1.5
MDHAQ FUNCTION SCORE: 1.1

## 2022-04-04 NOTE — PROGRESS NOTES
Subjective:          Chief Complaint: Adina Li is a 71 y.o. female who had no chief complaint listed for this encounter.    HPI:    Patient is a 68-year-old female here for f/u of PsA vs EOA (+hx of psoriasis but no active plaques) and fibromyalgia.    Serologies: CCP negative,  rheumatoid factor negative,   Imaging with erosive changes at the capitate and DIP.  Patient with hx of hepatic abscess (multiple) with sepsis strep species. Unclear source of infection.  Having pain hands, knees despite TKA,. shoulders      Last visit: Noting burning and stinging in her joints. Pains and stiffness. This is chronic  Seems to be progressing. Less mobility increased stiffness  Started Enbrel finally approved for financial support completed perhaps 4 injections and noting benefit!! Pain 3/10    Current regimen:    HCQ 200mg BID  Arava 10mg daily- discontinued 10 months ago for cost.    No significant benefit with MTX   Nabumetone 500mg BID     Now receiving from Psych:   Gabapentin 300mg BID  Cymbalta 60mg BID      past medical history for type 2 diabetes mellitus, hypertension, IBS, previous myocardial infarction and diverticulitis.   She has DIP pain, swelling and deformity. Some PIP, little MCP and some wrist tenderness. She has long hx of arthritis with bilateral TKA. She has hx of Lumbar DDD/DJD and cervical DDD with [facility]-Dr. Sim. Patient notes this started as a young woman 20s and 30s. She was rather active. She has some pain stiffness in shoulders. Currently right ring finger burns and stiff. Described as ache. he is taking Advil PM helps with pain and for sleep, chondroitin sulfate or move free. Recently started [place] water and feeling much better.   She is s/p CAD with stenting x 2 and sister with MI  at 45y/o.    Patient with some dry mouth, no dry eyes, no serositis, pleurisy, ILD. ? Psoriasis scalp.  [facility] with cardiac work up no occlusive CAD.2017. Follows regularly with Cardiology  Did just have  interventional pain RFA for both lumbar and neck-Dr. Carlton.       REVIEW OF SYSTEMS:    Review of Systems   Constitutional: Negative for fever, malaise/fatigue and weight loss.   HENT: Negative for sore throat.    Eyes: Negative for double vision, photophobia and redness.   Respiratory: Negative for cough, shortness of breath and wheezing.    Cardiovascular: Negative for chest pain, palpitations and orthopnea.   Gastrointestinal: Negative for abdominal pain, constipation and diarrhea.   Genitourinary: Negative for dysuria, hematuria and urgency.   Musculoskeletal: Positive for back pain and joint pain. Negative for myalgias.   Skin: Negative for rash.   Neurological: Negative for dizziness, tingling, focal weakness and headaches.   Endo/Heme/Allergies: Does not bruise/bleed easily.   Psychiatric/Behavioral: Negative for depression, hallucinations and suicidal ideas.               Objective:            Past Medical History:   Diagnosis Date    Acid reflux     Anxiety     Arthritis     CAD (coronary artery disease)     Stant    Diabetes mellitus     Fibromyalgia     Hypertension     IBS (irritable bowel syndrome)     Myocardial infarction     Ulcerative colitis      Family History   Problem Relation Age of Onset    Colon cancer Mother     Heart attack Father     Heart attack Sister     Diabetes Sister     Breast cancer Sister      Social History     Tobacco Use    Smoking status: Former Smoker     Quit date: 2005     Years since quittin.9    Smokeless tobacco: Never Used   Substance Use Topics    Alcohol use: No     Alcohol/week: 0.0 standard drinks    Drug use: No         Current Outpatient Medications on File Prior to Visit   Medication Sig Dispense Refill    allopurinoL (ZYLOPRIM) 100 MG tablet allopurinol 100 mg tablet      aspirin (ECOTRIN) 81 MG EC tablet aspirin low dose 81mg ec      blood sugar diagnostic Strp 1 strip by Misc.(Non-Drug; Combo Route) route 4 (four) times daily.  150 each 11    cartilage/collagen II/hyaluron (MOVE FREE ULTRA ORAL) Take 1 tablet by mouth.      cefadroxil (DURICEF) 500 MG Cap Take 1 capsule (500 mg total) by mouth every 12 (twelve) hours. (Patient not taking: No sig reported) 14 capsule 0    cholecalciferol, vitamin D3, (VITAMIN D3) 10 mcg (400 unit) Tab Take 1 capsule by mouth.      cholestyramine (QUESTRAN) 4 gram packet Dissolve 1 packet in water twice a day      co-enzyme Q-10 30 mg capsule Take 100 mg by mouth once daily.      diclofenac sodium (VOLTAREN) 1 % Gel diclofenac 1 % topical gel      diphenoxylate-atropine 2.5-0.025 mg (LOMOTIL) 2.5-0.025 mg per tablet   1    dulaglutide (TRULICITY) 1.5 mg/0.5 mL pen injector Inject 1.5 mg into the skin.      DULoxetine (CYMBALTA) 60 MG capsule TK 1 C PO  capsule 2    etanercept (ENBREL SURECLICK) 50 mg/mL (1 mL) Inject 1 mL (50 mg total) into the skin once a week. 4 Syringe 11    exenatide microspheres (BYDUREON) 2 mg/0.65 mL PnIj Inject into the skin every 7 days.      furosemide (LASIX) 40 MG tablet Take 40 mg by mouth as needed.      gabapentin (NEURONTIN) 300 MG capsule Take 1 capsule (300 mg total) by mouth 2 (two) times daily. 180 capsule 3    glucosamine-chondroitin 500-400 mg tablet Take 1 tablet by mouth once daily.      HYDROcodone-acetaminophen (NORCO) 5-325 mg per tablet Take 1 tablet by mouth every 6 (six) hours as needed for Pain. 28 tablet 0    hydrOXYchloroQUINE (PLAQUENIL) 200 mg tablet Take 1 tablet (200 mg total) by mouth 2 (two) times daily. 180 tablet 3    INFUSION SET-INSULIN PUMP BODY MISC by Misc.(Non-Drug; Combo Route) route. VINNIE set to replace all insulin      insulin aspart U-100 (NOVOLOG) 100 unit/mL injection Inject 14 Units into the skin 3 (three) times daily before meals. 6 vial 3    insulin syringe-needle U-100 (BD INSULIN SYRINGE ULT-FINE II) 1 mL 31 gauge x 5/16 Syrg 1 applicator by Misc.(Non-Drug; Combo Route) route 4 (four) times daily. 400 each 3  "   krill-om3-dha-epa-om6-lip-astx 1,500-165-67.5 mg Cap Take by mouth once daily.      lancets 31 gauge Misc 1 lancet by Misc.(Non-Drug; Combo Route) route 4 (four) times daily. 200 each 4    LANTUS SOLOSTAR U-100 INSULIN glargine 100 units/mL (3mL) SubQ pen       leflunomide (ARAVA) 10 MG Tab Take 1 tablet (10 mg total) by mouth once daily. (Patient not taking: No sig reported) 90 tablet 3    LEVEMIR U-100 INSULIN 100 unit/mL injection INJECT 75 UNITS INTO THE SKIN EVERY EVENING. (Patient not taking: No sig reported) 10 mL 1    levothyroxine (SYNTHROID) 88 MCG tablet       losartan (COZAAR) 50 MG tablet losartan 50 mg tablet      metoprolol succinate (TOPROL-XL) 50 MG 24 hr tablet       mirtazapine (REMERON) 15 MG tablet Take 15 mg by mouth.      multivitamin (THERAGRAN) per tablet Take 1 tablet by mouth once daily.      nabumetone (RELAFEN) 500 MG tablet       nitroGLYCERIN (NITROSTAT) 0.4 MG SL tablet PLACE ONE TABLET UNDER THE TONGUE EVERY 5 MINUTES as needed for chest pain  1    pen needle, diabetic 32 gauge x 5/32" Ndle       pravastatin (PRAVACHOL) 20 MG tablet Take 20 mg by mouth once daily.      traZODone (DESYREL) 100 MG tablet Take 100 mg by mouth nightly.      TRULICITY 1.5 mg/0.5 mL PnIj INJECT ONE pen SUBCUTANEOUSLY once a week  6    XIFAXAN 550 mg Tab 1 tablet 3 times a day for 2 weeks (42 tablets)       No current facility-administered medications on file prior to visit.       Vitals:    04/04/22 1357   BP: (!) 148/74   Pulse: 70       Physical Exam:    Physical Exam   Constitutional: She appears well-developed and well-nourished.   HENT:   Nose: No septal deviation.   Mouth/Throat: Mucous membranes are normal. No oral lesions.   Eyes: Pupils are equal, round, and reactive to light. Right conjunctiva is not injected. Left conjunctiva is not injected.   Neck: No JVD present. No thyroid mass and no thyromegaly present.   Cardiovascular: Normal rate, regular rhythm and normal pulses. "   No edema   Pulmonary/Chest: Effort normal and breath sounds normal.   Abdominal: Soft. Normal appearance. There is no hepatosplenomegaly.   Musculoskeletal:        Right shoulder: She exhibits normal range of motion, no tenderness and no swelling.        Left shoulder: She exhibits normal range of motion, no tenderness and no swelling.        Right elbow: She exhibits normal range of motion and no swelling. No tenderness found.        Left elbow: She exhibits normal range of motion and no swelling. No tenderness found.        Right wrist: She exhibits normal range of motion, no tenderness and no swelling.        Left wrist: She exhibits normal range of motion, no tenderness and no swelling.        Right hip: She exhibits normal range of motion, normal strength and no swelling.        Left hip: She exhibits normal range of motion, no tenderness and no swelling.        Right knee: She exhibits normal range of motion and no swelling. No tenderness found.        Left knee: She exhibits normal range of motion and no swelling. No tenderness found.        Right ankle: She exhibits normal range of motion and no swelling. No tenderness.        Left ankle: She exhibits normal range of motion and no swelling. No tenderness.        Right hand: She exhibits decreased range of motion, tenderness and deformity.        Left hand: She exhibits decreased range of motion, tenderness and deformity.        Left foot: There is tenderness and swelling.   Patient is a DIP bony hypertrophy with deformity particular the second DIP bilaterally.  She has slight erythema at the third fourth and fifth right DIP she's difficulty with finger curl  strength reduced . no active synovitis in the wrist MCPs or PIPs.    +++tenderness at the left ischial bursa.    Lymphadenopathy:     She has no cervical adenopathy.     She has no axillary adenopathy.   Neurological: She has normal strength and normal reflexes.   Skin: Skin is dry and intact.    Psychiatric: She has a normal mood and affect.               Assessment:       Encounter Diagnoses   Name Primary?    Psoriatic arthritis Yes    Inflammatory arthritis     Immunosuppression           Plan:        Psoriatic arthritis  -     CBC Auto Differential; Standing; Expected date: 04/04/2022  -     Comprehensive Metabolic Panel; Standing; Expected date: 04/04/2022  -     Sedimentation rate; Standing; Expected date: 04/04/2022  -     C-Reactive Protein; Standing; Expected date: 04/04/2022    Inflammatory arthritis  -     CBC Auto Differential; Standing; Expected date: 04/04/2022  -     Comprehensive Metabolic Panel; Standing; Expected date: 04/04/2022  -     Sedimentation rate; Standing; Expected date: 04/04/2022  -     C-Reactive Protein; Standing; Expected date: 04/04/2022    Immunosuppression  -     CBC Auto Differential; Standing; Expected date: 04/04/2022  -     Comprehensive Metabolic Panel; Standing; Expected date: 04/04/2022  -     Sedimentation rate; Standing; Expected date: 04/04/2022  -     C-Reactive Protein; Standing; Expected date: 04/04/2022         PsA : Rapid 3:  4.33 now 2.72 and doing well.   LFA 10 mg off now.   Continue-Hydroxychloroquine 200mg BID  Enbrel started 8/2021 and at last visit 12/2021 was doing better overall.     Labs recent:     Receiving from Psych               - Duloxetine (Cymbalta) 60mg daily due to insurance cost has been off.               -Continue Gabapentin 300mg BID       Patient is aware of the risks benefits side effects and monitoring requirements of biologic therapy including but not limited to disseminated tuberculosis recurrent serious infections suppression of the immune system, injection site reactions.      Follow up in about 4 months (around 8/4/2022).          30min consultation with greater than 50% spent in counseling, chart review and coordination of care. All questions answered.

## 2022-06-10 ENCOUNTER — LAB VISIT (OUTPATIENT)
Dept: LAB | Facility: HOSPITAL | Age: 72
End: 2022-06-10
Attending: INTERNAL MEDICINE
Payer: MEDICARE

## 2022-06-10 DIAGNOSIS — M19.90 INFLAMMATORY ARTHRITIS: ICD-10-CM

## 2022-06-10 DIAGNOSIS — D84.9 IMMUNOSUPPRESSION: ICD-10-CM

## 2022-06-10 DIAGNOSIS — L40.50 PSORIATIC ARTHRITIS: ICD-10-CM

## 2022-06-10 LAB
ALBUMIN SERPL BCP-MCNC: 3.6 G/DL (ref 3.5–5.2)
ALP SERPL-CCNC: 95 U/L (ref 55–135)
ALT SERPL W/O P-5'-P-CCNC: 37 U/L (ref 10–44)
ANION GAP SERPL CALC-SCNC: 14 MMOL/L (ref 8–16)
AST SERPL-CCNC: 32 U/L (ref 10–40)
BASOPHILS # BLD AUTO: 0.06 K/UL (ref 0–0.2)
BASOPHILS NFR BLD: 0.8 % (ref 0–1.9)
BILIRUB SERPL-MCNC: 0.4 MG/DL (ref 0.1–1)
BUN SERPL-MCNC: 17 MG/DL (ref 8–23)
CALCIUM SERPL-MCNC: 9.6 MG/DL (ref 8.7–10.5)
CHLORIDE SERPL-SCNC: 102 MMOL/L (ref 95–110)
CO2 SERPL-SCNC: 21 MMOL/L (ref 23–29)
CREAT SERPL-MCNC: 0.8 MG/DL (ref 0.5–1.4)
CRP SERPL-MCNC: 1.1 MG/L (ref 0–8.2)
DIFFERENTIAL METHOD: ABNORMAL
EOSINOPHIL # BLD AUTO: 0.2 K/UL (ref 0–0.5)
EOSINOPHIL NFR BLD: 3 % (ref 0–8)
ERYTHROCYTE [DISTWIDTH] IN BLOOD BY AUTOMATED COUNT: 12.6 % (ref 11.5–14.5)
ERYTHROCYTE [SEDIMENTATION RATE] IN BLOOD BY WESTERGREN METHOD: 27 MM/HR (ref 0–20)
EST. GFR  (AFRICAN AMERICAN): >60 ML/MIN/1.73 M^2
EST. GFR  (NON AFRICAN AMERICAN): >60 ML/MIN/1.73 M^2
GLUCOSE SERPL-MCNC: 174 MG/DL (ref 70–110)
HCT VFR BLD AUTO: 40.7 % (ref 37–48.5)
HGB BLD-MCNC: 13.3 G/DL (ref 12–16)
IMM GRANULOCYTES # BLD AUTO: 0.04 K/UL (ref 0–0.04)
IMM GRANULOCYTES NFR BLD AUTO: 0.5 % (ref 0–0.5)
LYMPHOCYTES # BLD AUTO: 2.4 K/UL (ref 1–4.8)
LYMPHOCYTES NFR BLD: 32.4 % (ref 18–48)
MCH RBC QN AUTO: 31.4 PG (ref 27–31)
MCHC RBC AUTO-ENTMCNC: 32.7 G/DL (ref 32–36)
MCV RBC AUTO: 96 FL (ref 82–98)
MONOCYTES # BLD AUTO: 0.9 K/UL (ref 0.3–1)
MONOCYTES NFR BLD: 11.5 % (ref 4–15)
NEUTROPHILS # BLD AUTO: 3.8 K/UL (ref 1.8–7.7)
NEUTROPHILS NFR BLD: 51.8 % (ref 38–73)
NRBC BLD-RTO: 0 /100 WBC
PLATELET # BLD AUTO: 288 K/UL (ref 150–450)
PMV BLD AUTO: 10.8 FL (ref 9.2–12.9)
POTASSIUM SERPL-SCNC: 4.2 MMOL/L (ref 3.5–5.1)
PROT SERPL-MCNC: 7.4 G/DL (ref 6–8.4)
RBC # BLD AUTO: 4.24 M/UL (ref 4–5.4)
SODIUM SERPL-SCNC: 137 MMOL/L (ref 136–145)
WBC # BLD AUTO: 7.37 K/UL (ref 3.9–12.7)

## 2022-06-10 PROCEDURE — 36415 COLL VENOUS BLD VENIPUNCTURE: CPT | Mod: PO | Performed by: INTERNAL MEDICINE

## 2022-06-10 PROCEDURE — 85025 COMPLETE CBC W/AUTO DIFF WBC: CPT | Performed by: INTERNAL MEDICINE

## 2022-06-10 PROCEDURE — 80053 COMPREHEN METABOLIC PANEL: CPT | Performed by: INTERNAL MEDICINE

## 2022-06-10 PROCEDURE — 85651 RBC SED RATE NONAUTOMATED: CPT | Mod: PO | Performed by: INTERNAL MEDICINE

## 2022-06-10 PROCEDURE — 86140 C-REACTIVE PROTEIN: CPT | Performed by: INTERNAL MEDICINE

## 2022-08-02 ENCOUNTER — LAB VISIT (OUTPATIENT)
Dept: LAB | Facility: HOSPITAL | Age: 72
End: 2022-08-02
Attending: INTERNAL MEDICINE
Payer: MEDICARE

## 2022-08-02 DIAGNOSIS — M19.90 INFLAMMATORY ARTHRITIS: ICD-10-CM

## 2022-08-02 DIAGNOSIS — L40.50 PSORIATIC ARTHRITIS: ICD-10-CM

## 2022-08-02 DIAGNOSIS — D84.9 IMMUNOSUPPRESSION: ICD-10-CM

## 2022-08-02 LAB
ALBUMIN SERPL BCP-MCNC: 3.7 G/DL (ref 3.5–5.2)
ALP SERPL-CCNC: 113 U/L (ref 55–135)
ALT SERPL W/O P-5'-P-CCNC: 47 U/L (ref 10–44)
ANION GAP SERPL CALC-SCNC: 10 MMOL/L (ref 8–16)
AST SERPL-CCNC: 28 U/L (ref 10–40)
BASOPHILS # BLD AUTO: 0.11 K/UL (ref 0–0.2)
BASOPHILS NFR BLD: 1.1 % (ref 0–1.9)
BILIRUB SERPL-MCNC: 0.4 MG/DL (ref 0.1–1)
BUN SERPL-MCNC: 17 MG/DL (ref 8–23)
CALCIUM SERPL-MCNC: 9.6 MG/DL (ref 8.7–10.5)
CHLORIDE SERPL-SCNC: 103 MMOL/L (ref 95–110)
CO2 SERPL-SCNC: 28 MMOL/L (ref 23–29)
CREAT SERPL-MCNC: 0.7 MG/DL (ref 0.5–1.4)
CRP SERPL-MCNC: 0.3 MG/L (ref 0–8.2)
DIFFERENTIAL METHOD: ABNORMAL
EOSINOPHIL # BLD AUTO: 0.2 K/UL (ref 0–0.5)
EOSINOPHIL NFR BLD: 2.3 % (ref 0–8)
ERYTHROCYTE [DISTWIDTH] IN BLOOD BY AUTOMATED COUNT: 13 % (ref 11.5–14.5)
ERYTHROCYTE [SEDIMENTATION RATE] IN BLOOD BY WESTERGREN METHOD: 13 MM/HR (ref 0–20)
EST. GFR  (NO RACE VARIABLE): >60 ML/MIN/1.73 M^2
GLUCOSE SERPL-MCNC: 72 MG/DL (ref 70–110)
HCT VFR BLD AUTO: 40.8 % (ref 37–48.5)
HGB BLD-MCNC: 13.7 G/DL (ref 12–16)
IMM GRANULOCYTES # BLD AUTO: 0.04 K/UL (ref 0–0.04)
IMM GRANULOCYTES NFR BLD AUTO: 0.4 % (ref 0–0.5)
LYMPHOCYTES # BLD AUTO: 2.7 K/UL (ref 1–4.8)
LYMPHOCYTES NFR BLD: 27.9 % (ref 18–48)
MCH RBC QN AUTO: 32 PG (ref 27–31)
MCHC RBC AUTO-ENTMCNC: 33.6 G/DL (ref 32–36)
MCV RBC AUTO: 95 FL (ref 82–98)
MONOCYTES # BLD AUTO: 1 K/UL (ref 0.3–1)
MONOCYTES NFR BLD: 10.5 % (ref 4–15)
NEUTROPHILS # BLD AUTO: 5.6 K/UL (ref 1.8–7.7)
NEUTROPHILS NFR BLD: 57.8 % (ref 38–73)
NRBC BLD-RTO: 0 /100 WBC
PLATELET # BLD AUTO: 290 K/UL (ref 150–450)
PMV BLD AUTO: 11.3 FL (ref 9.2–12.9)
POTASSIUM SERPL-SCNC: 4.6 MMOL/L (ref 3.5–5.1)
PROT SERPL-MCNC: 7 G/DL (ref 6–8.4)
RBC # BLD AUTO: 4.28 M/UL (ref 4–5.4)
SODIUM SERPL-SCNC: 141 MMOL/L (ref 136–145)
WBC # BLD AUTO: 9.7 K/UL (ref 3.9–12.7)

## 2022-08-02 PROCEDURE — 80053 COMPREHEN METABOLIC PANEL: CPT | Performed by: INTERNAL MEDICINE

## 2022-08-02 PROCEDURE — 85025 COMPLETE CBC W/AUTO DIFF WBC: CPT | Performed by: INTERNAL MEDICINE

## 2022-08-02 PROCEDURE — 36415 COLL VENOUS BLD VENIPUNCTURE: CPT | Mod: PO | Performed by: INTERNAL MEDICINE

## 2022-08-02 PROCEDURE — 85651 RBC SED RATE NONAUTOMATED: CPT | Mod: PO | Performed by: INTERNAL MEDICINE

## 2022-08-02 PROCEDURE — 86140 C-REACTIVE PROTEIN: CPT | Performed by: INTERNAL MEDICINE

## 2022-08-04 ENCOUNTER — OFFICE VISIT (OUTPATIENT)
Dept: RHEUMATOLOGY | Facility: CLINIC | Age: 72
End: 2022-08-04
Payer: MEDICARE

## 2022-08-04 VITALS
WEIGHT: 196.44 LBS | BODY MASS INDEX: 32.73 KG/M2 | HEIGHT: 65 IN | DIASTOLIC BLOOD PRESSURE: 55 MMHG | HEART RATE: 74 BPM | SYSTOLIC BLOOD PRESSURE: 118 MMHG

## 2022-08-04 DIAGNOSIS — M79.7 FIBROMYALGIA: ICD-10-CM

## 2022-08-04 DIAGNOSIS — D84.9 IMMUNOSUPPRESSION: ICD-10-CM

## 2022-08-04 DIAGNOSIS — M19.90 INFLAMMATORY ARTHRITIS: Primary | ICD-10-CM

## 2022-08-04 PROCEDURE — 99999 PR PBB SHADOW E&M-EST. PATIENT-LVL V: CPT | Mod: PBBFAC,,, | Performed by: INTERNAL MEDICINE

## 2022-08-04 PROCEDURE — 99215 OFFICE O/P EST HI 40 MIN: CPT | Mod: PBBFAC,PN | Performed by: INTERNAL MEDICINE

## 2022-08-04 PROCEDURE — 99215 OFFICE O/P EST HI 40 MIN: CPT | Mod: S$PBB,,, | Performed by: INTERNAL MEDICINE

## 2022-08-04 PROCEDURE — 99215 PR OFFICE/OUTPT VISIT, EST, LEVL V, 40-54 MIN: ICD-10-PCS | Mod: S$PBB,,, | Performed by: INTERNAL MEDICINE

## 2022-08-04 PROCEDURE — 99999 PR PBB SHADOW E&M-EST. PATIENT-LVL V: ICD-10-PCS | Mod: PBBFAC,,, | Performed by: INTERNAL MEDICINE

## 2022-08-04 ASSESSMENT — ROUTINE ASSESSMENT OF PATIENT INDEX DATA (RAPID3)
MDHAQ FUNCTION SCORE: 1.1
TOTAL RAPID3 SCORE: 4.55
PSYCHOLOGICAL DISTRESS SCORE: 2.2
FATIGUE SCORE: 2.2
PATIENT GLOBAL ASSESSMENT SCORE: 7
PAIN SCORE: 3

## 2022-08-04 NOTE — PROGRESS NOTES
Subjective:          Chief Complaint: Adina Li is a 72 y.o. female who had concerns including Disease Management.    HPI:    Patient is a 72-year-old female here for f/u of PsA vs EOA (+hx of psoriasis but no active plaques) and fibromyalgia.    Serologies: CCP negative,  rheumatoid factor negative,   Imaging with erosive changes at the capitate and DIP.  Patient with hx of hepatic abscess (multiple) with sepsis strep species. Unclear source of infection.  Having pain hands, knees despite TKA,. shoulders      Last visit: Noting burning and stinging in her joints. Pains and stiffness. This is chronic  Seems to be progressing. Less mobility increased stiffness  Started Enbrel finally approved for financial support completed perhaps 4 injections and noting benefit!! Pain 3/10    Current regimen:   Enbrel 2021-present   HCQ 200mg BID  Arava 10mg daily- discontinued  for cost    No significant benefit with MTX   Nabumetone 500mg BID     Now receiving from Psych:   Gabapentin 300mg BID  Cymbalta 60mg BID      past medical history for type 2 diabetes mellitus, hypertension, IBS, previous myocardial infarction and diverticulitis.   She has DIP pain, swelling and deformity. Some PIP, little MCP and some wrist tenderness. She has long hx of arthritis with bilateral TKA. She has hx of Lumbar DDD/DJD and cervical DDD with [facility]-Dr. Sim. Patient notes this started as a young woman 20s and 30s. She was rather active. She has some pain stiffness in shoulders. Currently right ring finger burns and stiff. Described as ache. he is taking Advil PM helps with pain and for sleep, chondroitin sulfate or move free. Recently started [place] water and feeling much better.   She is s/p CAD with stenting x 2 and sister with MI  at 43y/o.    Patient with some dry mouth, no dry eyes, no serositis, pleurisy, ILD. ? Psoriasis scalp.  [facility] with cardiac work up no occlusive CAD.2017. Follows regularly with Cardiology  Did just  have interventional pain RFA for both lumbar and neck-Dr. Carlton.       REVIEW OF SYSTEMS:    Review of Systems   Constitutional: Negative for fever, malaise/fatigue and weight loss.   HENT: Negative for sore throat.    Eyes: Negative for double vision, photophobia and redness.   Respiratory: Negative for cough, shortness of breath and wheezing.    Cardiovascular: Negative for chest pain, palpitations and orthopnea.   Gastrointestinal: Negative for abdominal pain, constipation and diarrhea.   Genitourinary: Negative for dysuria, hematuria and urgency.   Musculoskeletal: Positive for back pain and joint pain. Negative for myalgias.   Skin: Negative for rash.   Neurological: Negative for dizziness, tingling, focal weakness and headaches.   Endo/Heme/Allergies: Does not bruise/bleed easily.   Psychiatric/Behavioral: Negative for depression, hallucinations and suicidal ideas.               Objective:            Past Medical History:   Diagnosis Date    Acid reflux     Anxiety     Arthritis     CAD (coronary artery disease)     Stant    Diabetes mellitus     Fibromyalgia     Hypertension     IBS (irritable bowel syndrome)     Myocardial infarction     Ulcerative colitis      Family History   Problem Relation Age of Onset    Colon cancer Mother     Heart attack Father     Heart attack Sister     Diabetes Sister     Breast cancer Sister      Social History     Tobacco Use    Smoking status: Former Smoker     Quit date: 2005     Years since quittin.2    Smokeless tobacco: Never Used   Substance Use Topics    Alcohol use: No     Alcohol/week: 0.0 standard drinks    Drug use: No         Current Outpatient Medications on File Prior to Visit   Medication Sig Dispense Refill    allopurinoL (ZYLOPRIM) 100 MG tablet allopurinol 100 mg tablet      aspirin (ECOTRIN) 81 MG EC tablet aspirin low dose 81mg ec      blood sugar diagnostic Strp 1 strip by Misc.(Non-Drug; Combo Route) route 4 (four) times daily. 150 each 11     "busPIRone (BUSPAR) 15 MG tablet       cartilage/collagen II/hyaluron (MOVE FREE ULTRA ORAL) Take 1 tablet by mouth.      cholecalciferol, vitamin D3, (VITAMIN D3) 10 mcg (400 unit) Tab Take 1 capsule by mouth.      cholestyramine (QUESTRAN) 4 gram packet Dissolve 1 packet in water twice a day      co-enzyme Q-10 30 mg capsule Take 100 mg by mouth once daily.      diclofenac sodium (VOLTAREN) 1 % Gel diclofenac 1 % topical gel      diphenoxylate-atropine 2.5-0.025 mg (LOMOTIL) 2.5-0.025 mg per tablet   1    DULoxetine (CYMBALTA) 60 MG capsule TK 1 C PO  capsule 2    furosemide (LASIX) 40 MG tablet Take 40 mg by mouth as needed.      HYDROcodone-acetaminophen (NORCO) 5-325 mg per tablet Take 1 tablet by mouth every 6 (six) hours as needed for Pain. 28 tablet 0    hydrOXYchloroQUINE (PLAQUENIL) 200 mg tablet Take 1 tablet (200 mg total) by mouth 2 (two) times daily. 180 tablet 3    INFUSION SET-INSULIN PUMP BODY MISC by Misc.(Non-Drug; Combo Route) route. VINNIE set to replace all insulin      insulin aspart U-100 (NOVOLOG) 100 unit/mL injection Inject 14 Units into the skin 3 (three) times daily before meals. 6 vial 3    insulin syringe-needle U-100 (BD INSULIN SYRINGE ULT-FINE II) 1 mL 31 gauge x 5/16 Syrg 1 applicator by Misc.(Non-Drug; Combo Route) route 4 (four) times daily. 400 each 3    lancets 31 gauge Misc 1 lancet by Misc.(Non-Drug; Combo Route) route 4 (four) times daily. 200 each 4    LANTUS SOLOSTAR U-100 INSULIN glargine 100 units/mL (3mL) SubQ pen       levothyroxine (SYNTHROID) 88 MCG tablet       losartan (COZAAR) 50 MG tablet losartan 50 mg tablet      metoprolol succinate (TOPROL-XL) 50 MG 24 hr tablet       multivitamin (THERAGRAN) per tablet Take 1 tablet by mouth once daily.      nabumetone (RELAFEN) 500 MG tablet       pen needle, diabetic 32 gauge x 5/32" Ndle       pravastatin (PRAVACHOL) 20 MG tablet Take 20 mg by mouth once daily.      traZODone (DESYREL) 100 MG tablet Take 100 mg by " mouth nightly.      cefadroxil (DURICEF) 500 MG Cap Take 1 capsule (500 mg total) by mouth every 12 (twelve) hours. (Patient not taking: Reported on 8/4/2022) 14 capsule 0    dulaglutide (TRULICITY) 1.5 mg/0.5 mL pen injector Inject 1.5 mg into the skin.      etanercept (ENBREL SURECLICK) 50 mg/mL (1 mL) Inject 1 mL (50 mg total) into the skin once a week. 4 Syringe 11    exenatide microspheres (BYDUREON) 2 mg/0.65 mL PnIj Inject into the skin every 7 days.      gabapentin (NEURONTIN) 300 MG capsule Take 1 capsule (300 mg total) by mouth 2 (two) times daily. 180 capsule 3    glucosamine-chondroitin 500-400 mg tablet Take 1 tablet by mouth once daily.      krill-om3-dha-epa-om6-lip-astx 1,500-165-67.5 mg Cap Take by mouth once daily.      lamoTRIgine (LAMICTAL) 100 MG tablet Take 100 mg by mouth.      LEVEMIR U-100 INSULIN 100 unit/mL injection INJECT 75 UNITS INTO THE SKIN EVERY EVENING. (Patient not taking: Reported on 8/4/2022) 10 mL 1    mirtazapine (REMERON) 15 MG tablet Take 15 mg by mouth.      nitroGLYCERIN (NITROSTAT) 0.4 MG SL tablet PLACE ONE TABLET UNDER THE TONGUE EVERY 5 MINUTES as needed for chest pain  1    TRULICITY 1.5 mg/0.5 mL PnIj INJECT ONE pen SUBCUTANEOUSLY once a week  6    XIFAXAN 550 mg Tab 1 tablet 3 times a day for 2 weeks (42 tablets)       No current facility-administered medications on file prior to visit.       Vitals:    08/04/22 1301   BP: (!) 118/55   Pulse: 74       Physical Exam:    Physical Exam   Constitutional: She appears well-developed and well-nourished.   HENT:   Nose: No septal deviation.   Mouth/Throat: Mucous membranes are normal. No oral lesions.   Eyes: Pupils are equal, round, and reactive to light. Right conjunctiva is not injected. Left conjunctiva is not injected.   Neck: No JVD present. No thyroid mass and no thyromegaly present.   Cardiovascular: Normal rate, regular rhythm and normal pulses.   No edema   Pulmonary/Chest: Effort normal and breath sounds normal.    Abdominal: Soft. Normal appearance. There is no hepatosplenomegaly.   Musculoskeletal:        Right shoulder: She exhibits normal range of motion, no tenderness and no swelling.        Left shoulder: She exhibits normal range of motion, no tenderness and no swelling.        Right elbow: She exhibits normal range of motion and no swelling. No tenderness found.        Left elbow: She exhibits normal range of motion and no swelling. No tenderness found.        Right wrist: She exhibits normal range of motion, no tenderness and no swelling.        Left wrist: She exhibits normal range of motion, no tenderness and no swelling.        Right hip: She exhibits normal range of motion, normal strength and no swelling.        Left hip: She exhibits normal range of motion, no tenderness and no swelling.        Right knee: She exhibits normal range of motion and no swelling. No tenderness found.        Left knee: She exhibits normal range of motion and no swelling. No tenderness found.        Right ankle: She exhibits normal range of motion and no swelling. No tenderness.        Left ankle: She exhibits normal range of motion and no swelling. No tenderness.        Right hand: She exhibits decreased range of motion, tenderness and deformity.        Left hand: She exhibits decreased range of motion, tenderness and deformity.        Left foot: There is tenderness and swelling.   Patient is a DIP bony hypertrophy with deformity particular the second DIP bilaterally.  She has slight erythema at the third fourth and fifth right DIP she's difficulty with finger curl  strength reduced . no active synovitis in the wrist MCPs or PIPs.    +++tenderness at the left ischial bursa.    Lymphadenopathy:     She has no cervical adenopathy.     She has no axillary adenopathy.   Neurological: She has normal strength and normal reflexes.   Skin: Skin is dry and intact.   Psychiatric: She has a normal mood and affect.               Assessment:        Encounter Diagnoses   Name Primary?    Inflammatory arthritis Yes    Fibromyalgia     Immunosuppression           Plan:        Inflammatory arthritis  -     CBC Auto Differential; Standing; Expected date: 08/04/2022  -     Comprehensive Metabolic Panel; Standing; Expected date: 08/04/2022  -     Sedimentation rate; Standing; Expected date: 08/04/2022  -     C-Reactive Protein; Standing; Expected date: 08/04/2022    Fibromyalgia    Immunosuppression  -     CBC Auto Differential; Standing; Expected date: 08/04/2022  -     Comprehensive Metabolic Panel; Standing; Expected date: 08/04/2022  -     Sedimentation rate; Standing; Expected date: 08/04/2022  -     C-Reactive Protein; Standing; Expected date: 08/04/2022       PsA : Rapid 3:  4.33 now 2.72 and doing well.   LFA 10 mg off now.   Continue-Hydroxychloroquine 200mg BID  Enbrel started 8/2021 and  doing better overall.     Labs recent:     Receiving from Psych               - Duloxetine (Cymbalta) 60mg daily due to insurance cost has been off.               -Continue Gabapentin 300mg BID       Patient is aware of the risks benefits side effects and monitoring requirements of biologic therapy including but not limited to disseminated tuberculosis recurrent serious infections suppression of the immune system, injection site reactions.      No follow-ups on file.      F/u 4 months with labs Q3 months    40min consultation with greater than 50% spent in counseling, chart review and coordination of care. All questions answered.

## 2022-09-01 PROBLEM — F17.201 TOBACCO DEPENDENCE IN REMISSION: Status: ACTIVE | Noted: 2022-09-01

## 2022-09-01 PROBLEM — R06.02 SHORTNESS OF BREATH: Status: ACTIVE | Noted: 2022-09-01

## 2022-09-01 PROBLEM — G47.33 OSA ON CPAP: Status: ACTIVE | Noted: 2022-09-01

## 2022-09-01 PROBLEM — I25.10 CORONARY ARTERY DISEASE INVOLVING NATIVE CORONARY ARTERY OF NATIVE HEART WITHOUT ANGINA PECTORIS: Status: ACTIVE | Noted: 2022-09-01

## 2022-11-02 ENCOUNTER — LAB VISIT (OUTPATIENT)
Dept: LAB | Facility: HOSPITAL | Age: 72
End: 2022-11-02
Payer: MEDICARE

## 2022-11-02 DIAGNOSIS — R63.5 ABNORMAL WEIGHT GAIN: Primary | ICD-10-CM

## 2022-11-02 DIAGNOSIS — M19.90 INFLAMMATORY ARTHRITIS: ICD-10-CM

## 2022-11-02 DIAGNOSIS — E78.2 MIXED HYPERLIPIDEMIA: ICD-10-CM

## 2022-11-02 DIAGNOSIS — Z79.4 ENCOUNTER FOR LONG-TERM (CURRENT) USE OF INSULIN: ICD-10-CM

## 2022-11-02 DIAGNOSIS — D84.9 IMMUNOSUPPRESSION: ICD-10-CM

## 2022-11-02 DIAGNOSIS — E04.1 NONTOXIC UNINODULAR GOITER: ICD-10-CM

## 2022-11-02 DIAGNOSIS — E55.9 AVITAMINOSIS D: ICD-10-CM

## 2022-11-02 DIAGNOSIS — E11.8 DIABETIC COMPLICATION: ICD-10-CM

## 2022-11-02 LAB
25(OH)D3+25(OH)D2 SERPL-MCNC: 24 NG/ML (ref 30–96)
ALBUMIN SERPL BCP-MCNC: 3.7 G/DL (ref 3.5–5.2)
ALBUMIN/CREAT UR: 8.3 UG/MG (ref 0–30)
ALP SERPL-CCNC: 75 U/L (ref 55–135)
ALT SERPL W/O P-5'-P-CCNC: 22 U/L (ref 10–44)
ANION GAP SERPL CALC-SCNC: 12 MMOL/L (ref 8–16)
AST SERPL-CCNC: 24 U/L (ref 10–40)
BASOPHILS # BLD AUTO: 0.07 K/UL (ref 0–0.2)
BASOPHILS NFR BLD: 1.2 % (ref 0–1.9)
BILIRUB SERPL-MCNC: 0.6 MG/DL (ref 0.1–1)
BUN SERPL-MCNC: 22 MG/DL (ref 8–23)
CALCIUM SERPL-MCNC: 9.6 MG/DL (ref 8.7–10.5)
CHLORIDE SERPL-SCNC: 99 MMOL/L (ref 95–110)
CHOLEST SERPL-MCNC: 120 MG/DL (ref 120–199)
CHOLEST/HDLC SERPL: 2.2 {RATIO} (ref 2–5)
CO2 SERPL-SCNC: 25 MMOL/L (ref 23–29)
CREAT SERPL-MCNC: 1 MG/DL (ref 0.5–1.4)
CREAT UR-MCNC: 120 MG/DL (ref 15–325)
CRP SERPL-MCNC: 0.5 MG/L (ref 0–8.2)
DIFFERENTIAL METHOD: ABNORMAL
EOSINOPHIL # BLD AUTO: 0.2 K/UL (ref 0–0.5)
EOSINOPHIL NFR BLD: 3.8 % (ref 0–8)
ERYTHROCYTE [DISTWIDTH] IN BLOOD BY AUTOMATED COUNT: 12.4 % (ref 11.5–14.5)
ERYTHROCYTE [SEDIMENTATION RATE] IN BLOOD BY PHOTOMETRIC METHOD: 13 MM/HR (ref 0–36)
EST. GFR  (NO RACE VARIABLE): >60 ML/MIN/1.73 M^2
ESTIMATED AVG GLUCOSE: 134 MG/DL (ref 68–131)
GLUCOSE SERPL-MCNC: 195 MG/DL (ref 70–110)
HBA1C MFR BLD: 6.3 % (ref 4–5.6)
HCT VFR BLD AUTO: 39.6 % (ref 37–48.5)
HDLC SERPL-MCNC: 55 MG/DL (ref 40–75)
HDLC SERPL: 45.8 % (ref 20–50)
HGB BLD-MCNC: 13.4 G/DL (ref 12–16)
IMM GRANULOCYTES # BLD AUTO: 0.01 K/UL (ref 0–0.04)
IMM GRANULOCYTES NFR BLD AUTO: 0.2 % (ref 0–0.5)
LDLC SERPL CALC-MCNC: 47.2 MG/DL (ref 63–159)
LYMPHOCYTES # BLD AUTO: 2 K/UL (ref 1–4.8)
LYMPHOCYTES NFR BLD: 34.5 % (ref 18–48)
MCH RBC QN AUTO: 31.9 PG (ref 27–31)
MCHC RBC AUTO-ENTMCNC: 33.8 G/DL (ref 32–36)
MCV RBC AUTO: 94 FL (ref 82–98)
MICROALBUMIN UR DL<=1MG/L-MCNC: 10 UG/ML
MONOCYTES # BLD AUTO: 0.6 K/UL (ref 0.3–1)
MONOCYTES NFR BLD: 11.1 % (ref 4–15)
NEUTROPHILS # BLD AUTO: 2.9 K/UL (ref 1.8–7.7)
NEUTROPHILS NFR BLD: 49.2 % (ref 38–73)
NONHDLC SERPL-MCNC: 65 MG/DL
NRBC BLD-RTO: 0 /100 WBC
PLATELET # BLD AUTO: 265 K/UL (ref 150–450)
PMV BLD AUTO: 11.4 FL (ref 9.2–12.9)
POTASSIUM SERPL-SCNC: 4.7 MMOL/L (ref 3.5–5.1)
PROT SERPL-MCNC: 7.1 G/DL (ref 6–8.4)
RBC # BLD AUTO: 4.2 M/UL (ref 4–5.4)
SODIUM SERPL-SCNC: 136 MMOL/L (ref 136–145)
TRIGL SERPL-MCNC: 89 MG/DL (ref 30–150)
WBC # BLD AUTO: 5.79 K/UL (ref 3.9–12.7)

## 2022-11-02 PROCEDURE — 85025 COMPLETE CBC W/AUTO DIFF WBC: CPT | Performed by: INTERNAL MEDICINE

## 2022-11-02 PROCEDURE — 86140 C-REACTIVE PROTEIN: CPT | Performed by: INTERNAL MEDICINE

## 2022-11-02 PROCEDURE — 36415 COLL VENOUS BLD VENIPUNCTURE: CPT | Mod: PO | Performed by: INTERNAL MEDICINE

## 2022-11-02 PROCEDURE — 82043 UR ALBUMIN QUANTITATIVE: CPT | Performed by: INTERNAL MEDICINE

## 2022-11-02 PROCEDURE — 85652 RBC SED RATE AUTOMATED: CPT | Performed by: INTERNAL MEDICINE

## 2022-11-02 PROCEDURE — 82570 ASSAY OF URINE CREATININE: CPT | Performed by: INTERNAL MEDICINE

## 2022-11-02 PROCEDURE — 82306 VITAMIN D 25 HYDROXY: CPT | Performed by: INTERNAL MEDICINE

## 2022-11-02 PROCEDURE — 80061 LIPID PANEL: CPT | Performed by: INTERNAL MEDICINE

## 2022-11-02 PROCEDURE — 83036 HEMOGLOBIN GLYCOSYLATED A1C: CPT | Performed by: INTERNAL MEDICINE

## 2022-11-02 PROCEDURE — 80053 COMPREHEN METABOLIC PANEL: CPT | Performed by: INTERNAL MEDICINE

## 2022-12-29 ENCOUNTER — OFFICE VISIT (OUTPATIENT)
Dept: RHEUMATOLOGY | Facility: CLINIC | Age: 72
End: 2022-12-29
Payer: MEDICARE

## 2022-12-29 VITALS
HEIGHT: 65 IN | HEART RATE: 83 BPM | DIASTOLIC BLOOD PRESSURE: 66 MMHG | WEIGHT: 198 LBS | SYSTOLIC BLOOD PRESSURE: 125 MMHG | BODY MASS INDEX: 32.99 KG/M2

## 2022-12-29 DIAGNOSIS — M67.431 GANGLION CYST OF DORSUM OF RIGHT WRIST: ICD-10-CM

## 2022-12-29 DIAGNOSIS — D84.9 IMMUNOSUPPRESSION: ICD-10-CM

## 2022-12-29 DIAGNOSIS — M19.90 INFLAMMATORY ARTHRITIS: ICD-10-CM

## 2022-12-29 DIAGNOSIS — L40.50 PSORIATIC ARTHRITIS: Primary | ICD-10-CM

## 2022-12-29 DIAGNOSIS — M79.7 FIBROMYALGIA: ICD-10-CM

## 2022-12-29 PROCEDURE — 99215 OFFICE O/P EST HI 40 MIN: CPT | Mod: S$PBB,,, | Performed by: INTERNAL MEDICINE

## 2022-12-29 PROCEDURE — 99213 OFFICE O/P EST LOW 20 MIN: CPT | Mod: PBBFAC,PN | Performed by: INTERNAL MEDICINE

## 2022-12-29 PROCEDURE — 99999 PR PBB SHADOW E&M-EST. PATIENT-LVL III: ICD-10-PCS | Mod: PBBFAC,,, | Performed by: INTERNAL MEDICINE

## 2022-12-29 PROCEDURE — 99215 PR OFFICE/OUTPT VISIT, EST, LEVL V, 40-54 MIN: ICD-10-PCS | Mod: S$PBB,,, | Performed by: INTERNAL MEDICINE

## 2022-12-29 PROCEDURE — 99999 PR PBB SHADOW E&M-EST. PATIENT-LVL III: CPT | Mod: PBBFAC,,, | Performed by: INTERNAL MEDICINE

## 2022-12-29 RX ORDER — NABUMETONE 500 MG/1
500 TABLET, FILM COATED ORAL 2 TIMES DAILY
Qty: 60 TABLET | Refills: 11 | Status: CANCELLED | OUTPATIENT
Start: 2022-12-29 | End: 2023-12-29

## 2022-12-29 RX ORDER — DULOXETIN HYDROCHLORIDE 60 MG/1
CAPSULE, DELAYED RELEASE ORAL
Qty: 180 CAPSULE | Refills: 2 | Status: CANCELLED | OUTPATIENT
Start: 2022-12-29

## 2022-12-29 NOTE — PROGRESS NOTES
Subjective:          Chief Complaint: Adina Li is a 72 y.o. female who had concerns including Disease Management.    HPI:    Patient is a 72-year-old female here for f/u of PsA vs EOA (+hx of psoriasis but no active plaques) and fibromyalgia.    Serologies: CCP negative,  rheumatoid factor negative,   Imaging with erosive changes at the capitate and DIP.  Patient with hx of hepatic abscess (multiple) with sepsis strep species. Unclear source of infection.  Having pain hands, knees despite TKA,. Shoulders      When a recent CT chest and pulmonary function testing overall relatively stable from a lung perspective was given an albuterol inhaler noted to have calcium of the coronaries followed up with her cardiologist.- who felt the stress test was fine. ECHO was fine.     She was seen with Dr. Salazar she would a left carpal tunnel and a right ganglion cyst  and trigger finger release still having some pain in the right hand.     Last visit: Noting burning and stinging in her joints. Pains and stiffness. This is chronic  Seems to be progressing. Less mobility increased stiffness  Started Enbrel finally approved for financial support completed perhaps 4 injections and noting benefit!! Pain 3/10    Current regimen:   Enbrel 7/2021-present   HCQ 200mg BID  Arava 10mg daily- discontinued 2021 for cost    No significant benefit with MTX   Nabumetone 500mg BID       Now receiving from Psych:   Gabapentin 300mg BID  Cymbalta 60mg BID-       past medical history for type 2 diabetes mellitus, hypertension, IBS, previous myocardial infarction and diverticulitis.   She has DIP pain, swelling and deformity. Some PIP, little MCP and some wrist tenderness. She has long hx of arthritis with bilateral TKA. She has hx of Lumbar DDD/DJD and cervical DDD with [facility]-Dr. Sim. Patient notes this started as a young woman 20s and 30s. She was rather active. She has some pain stiffness in shoulders. Currently right ring finger burns and  stiff. Described as ache. he is taking Advil PM helps with pain and for sleep, chondroitin sulfate or move free. Recently started [place] water and feeling much better.   She is s/p CAD with stenting x 2 and sister with MI  at 43y/o.    Patient with some dry mouth, no dry eyes, no serositis, pleurisy, ILD. ? Psoriasis scalp.  [facility] with cardiac work up no occlusive CAD.2017. Follows regularly with Cardiology  Did just have interventional pain RFA for both lumbar and neck-Dr. Carlton.       REVIEW OF SYSTEMS:    Review of Systems   Constitutional: Negative for fever, malaise/fatigue and weight loss.   HENT: Negative for sore throat.    Eyes: Negative for double vision, photophobia and redness.   Respiratory: Negative for cough, shortness of breath and wheezing.    Cardiovascular: Negative for chest pain, palpitations and orthopnea.   Gastrointestinal: Negative for abdominal pain, constipation and diarrhea.   Genitourinary: Negative for dysuria, hematuria and urgency.   Musculoskeletal: Positive for back pain and joint pain. Negative for myalgias.   Skin: Negative for rash.   Neurological: Negative for dizziness, tingling, focal weakness and headaches.   Endo/Heme/Allergies: Does not bruise/bleed easily.   Psychiatric/Behavioral: Negative for depression, hallucinations and suicidal ideas.               Objective:            Past Medical History:   Diagnosis Date    Acid reflux     Anxiety     Arthritis     CAD (coronary artery disease)     Stant    Diabetes mellitus     Fibromyalgia     Hypertension     IBS (irritable bowel syndrome)     Myocardial infarction     Ulcerative colitis      Family History   Problem Relation Age of Onset    Colon cancer Mother     Heart attack Father     Heart attack Sister     Diabetes Sister     Breast cancer Sister      Social History     Tobacco Use    Smoking status: Former     Types: Cigarettes     Quit date: 2005     Years since quittin.6    Smokeless tobacco:  Never   Substance Use Topics    Alcohol use: No     Alcohol/week: 0.0 standard drinks    Drug use: No         Current Outpatient Medications on File Prior to Visit   Medication Sig Dispense Refill    albuterol (VENTOLIN HFA) 90 mcg/actuation inhaler Inhale 1-2 puffs into the lungs every 6 (six) hours as needed for Wheezing or Shortness of Breath (or before exercise). Rescue 18 g 3    allopurinoL (ZYLOPRIM) 100 MG tablet allopurinol 100 mg tablet      aspirin (ECOTRIN) 81 MG EC tablet aspirin low dose 81mg ec      blood sugar diagnostic Strp 1 strip by Misc.(Non-Drug; Combo Route) route 4 (four) times daily. 150 each 11    busPIRone (BUSPAR) 15 MG tablet       cartilage/collagen II/hyaluron (MOVE FREE ULTRA ORAL) Take 1 tablet by mouth.      cholecalciferol, vitamin D3, (VITAMIN D3) 10 mcg (400 unit) Tab Take 1 capsule by mouth.      cholestyramine (QUESTRAN) 4 gram packet Dissolve 1 packet in water twice a day      co-enzyme Q-10 30 mg capsule Take 100 mg by mouth once daily.      diclofenac sodium (VOLTAREN) 1 % Gel diclofenac 1 % topical gel      diphenoxylate-atropine 2.5-0.025 mg (LOMOTIL) 2.5-0.025 mg per tablet   1    dulaglutide (TRULICITY) 1.5 mg/0.5 mL pen injector Inject 1.5 mg into the skin.      DULoxetine (CYMBALTA) 60 MG capsule TK 1 C PO  capsule 2    exenatide microspheres (BYDUREON) 2 mg/0.65 mL PnIj Inject into the skin every 7 days.      furosemide (LASIX) 40 MG tablet Take 40 mg by mouth as needed.      glucosamine-chondroitin 500-400 mg tablet Take 1 tablet by mouth once daily.      HYDROcodone-acetaminophen (NORCO) 5-325 mg per tablet Take 1 tablet by mouth every 6 (six) hours as needed for Pain. 28 tablet 0    hydrOXYchloroQUINE (PLAQUENIL) 200 mg tablet Take 1 tablet (200 mg total) by mouth 2 (two) times daily. 180 tablet 3    INFUSION SET-INSULIN PUMP BODY MISC by Misc.(Non-Drug; Combo Route) route. VINNIE set to replace all insulin      insulin aspart U-100 (NOVOLOG) 100 unit/mL  "injection Inject 14 Units into the skin 3 (three) times daily before meals. 6 vial 3    insulin syringe-needle U-100 (BD INSULIN SYRINGE ULT-FINE II) 1 mL 31 gauge x 5/16 Syrg 1 applicator by Misc.(Non-Drug; Combo Route) route 4 (four) times daily. 400 each 3    krill-om3-dha-epa-om6-lip-astx 1,500-165-67.5 mg Cap Take by mouth once daily.      lamoTRIgine (LAMICTAL) 100 MG tablet Take 100 mg by mouth.      lancets 31 gauge Misc 1 lancet by Misc.(Non-Drug; Combo Route) route 4 (four) times daily. 200 each 4    LANTUS SOLOSTAR U-100 INSULIN glargine 100 units/mL (3mL) SubQ pen       LEVEMIR U-100 INSULIN 100 unit/mL injection INJECT 75 UNITS INTO THE SKIN EVERY EVENING. 10 mL 1    levothyroxine (SYNTHROID) 88 MCG tablet       losartan (COZAAR) 50 MG tablet losartan 50 mg tablet      metoprolol succinate (TOPROL-XL) 50 MG 24 hr tablet       mirtazapine (REMERON) 15 MG tablet Take 15 mg by mouth.      multivitamin (THERAGRAN) per tablet Take 1 tablet by mouth once daily.      nabumetone (RELAFEN) 500 MG tablet       nitroGLYCERIN (NITROSTAT) 0.4 MG SL tablet PLACE ONE TABLET UNDER THE TONGUE EVERY 5 MINUTES as needed for chest pain  1    pen needle, diabetic 32 gauge x 5/32" Ndle       pravastatin (PRAVACHOL) 20 MG tablet Take 20 mg by mouth once daily.      traZODone (DESYREL) 100 MG tablet Take 100 mg by mouth nightly.      TRULICITY 1.5 mg/0.5 mL PnIj INJECT ONE pen SUBCUTANEOUSLY once a week  6    XIFAXAN 550 mg Tab 1 tablet 3 times a day for 2 weeks (42 tablets)      cefadroxil (DURICEF) 500 MG Cap Take 1 capsule (500 mg total) by mouth every 12 (twelve) hours. (Patient not taking: Reported on 9/1/2022) 14 capsule 0    etanercept (ENBREL SURECLICK) 50 mg/mL (1 mL) Inject 1 mL (50 mg total) into the skin once a week. 4 Syringe 11    gabapentin (NEURONTIN) 300 MG capsule Take 1 capsule (300 mg total) by mouth 2 (two) times daily. 180 capsule 3     No current facility-administered medications on file prior to visit. "       Vitals:    12/29/22 1124   BP: 125/66   Pulse: 83       Physical Exam:    Physical Exam   Constitutional: She appears well-developed and well-nourished.   HENT:   Nose: No septal deviation.   Mouth/Throat: Mucous membranes are normal. No oral lesions.   Eyes: Pupils are equal, round, and reactive to light. Right conjunctiva is not injected. Left conjunctiva is not injected.   Neck: No JVD present. No thyroid mass and no thyromegaly present.   Cardiovascular: Normal rate, regular rhythm and normal pulses.   No edema   Pulmonary/Chest: Effort normal and breath sounds normal.   Abdominal: Soft. Normal appearance. There is no hepatosplenomegaly.   Musculoskeletal:        Right shoulder: She exhibits normal range of motion, no tenderness and no swelling.        Left shoulder: She exhibits normal range of motion, no tenderness and no swelling.        Right elbow: She exhibits normal range of motion and no swelling. No tenderness found.        Left elbow: She exhibits normal range of motion and no swelling. No tenderness found.        Right wrist: She exhibits normal range of motion, no tenderness and no swelling.        Left wrist: She exhibits normal range of motion, no tenderness and no swelling.        Right hip: She exhibits normal range of motion, normal strength and no swelling.        Left hip: She exhibits normal range of motion, no tenderness and no swelling.        Right knee: She exhibits normal range of motion and no swelling. No tenderness found.        Left knee: She exhibits normal range of motion and no swelling. No tenderness found.        Right ankle: She exhibits normal range of motion and no swelling. No tenderness.        Left ankle: She exhibits normal range of motion and no swelling. No tenderness.        Right hand: She exhibits decreased range of motion, tenderness and deformity.        Left hand: She exhibits decreased range of motion, tenderness and deformity.        Left foot: There is  tenderness and swelling.   Patient is a DIP bony hypertrophy with deformity particular the second DIP bilaterally.  She has slight erythema at the third fourth and fifth right DIP   Visible swelling right wrist and 3rd and 4th fingers she has difficulty with finger curl  strength reduced .  +++tenderness at the left ischial bursa.    Lymphadenopathy:     She has no cervical adenopathy.     She has no axillary adenopathy.   Neurological: She has normal strength and normal reflexes.   Skin: Skin is dry and intact.   Psychiatric: She has a normal mood and affect.               Assessment:       Encounter Diagnoses   Name Primary?    Psoriatic arthritis Yes    Inflammatory arthritis     Fibromyalgia     Ganglion cyst of dorsum of right wrist     Immunosuppression             Plan:        Psoriatic arthritis  -     CBC Auto Differential; Standing; Expected date: 12/29/2022  -     Comprehensive Metabolic Panel; Standing; Expected date: 12/29/2022  -     Sedimentation rate; Standing; Expected date: 12/29/2022  -     C-Reactive Protein; Standing; Expected date: 12/29/2022  -     CBC Auto Differential; Standing; Expected date: 12/29/2022  -     Comprehensive Metabolic Panel; Standing; Expected date: 12/29/2022  -     Sedimentation rate; Standing; Expected date: 12/29/2022  -     C-Reactive Protein; Standing; Expected date: 12/29/2022    Inflammatory arthritis  -     CBC Auto Differential; Standing; Expected date: 12/29/2022  -     Comprehensive Metabolic Panel; Standing; Expected date: 12/29/2022  -     Sedimentation rate; Standing; Expected date: 12/29/2022  -     C-Reactive Protein; Standing; Expected date: 12/29/2022  -     CBC Auto Differential; Standing; Expected date: 12/29/2022  -     Comprehensive Metabolic Panel; Standing; Expected date: 12/29/2022  -     Sedimentation rate; Standing; Expected date: 12/29/2022  -     C-Reactive Protein; Standing; Expected date: 12/29/2022    Fibromyalgia    Ganglion cyst of  dorsum of right wrist    Immunosuppression  -     CBC Auto Differential; Standing; Expected date: 12/29/2022  -     Comprehensive Metabolic Panel; Standing; Expected date: 12/29/2022  -     Sedimentation rate; Standing; Expected date: 12/29/2022  -     C-Reactive Protein; Standing; Expected date: 12/29/2022  -     CBC Auto Differential; Standing; Expected date: 12/29/2022  -     Comprehensive Metabolic Panel; Standing; Expected date: 12/29/2022  -     Sedimentation rate; Standing; Expected date: 12/29/2022  -     C-Reactive Protein; Standing; Expected date: 12/29/2022           PsA : Rapid 3:  4.33 now 2.72 and doing well.   Continue-Hydroxychloroquine 200mg BID  Enbrel started 8/2021 and  doing better overall.     Labs recent: reviewed     Receiving from Psych               - Duloxetine (Cymbalta) 60mg BID              -Continue Gabapentin 300mg BID     After new Year new insurance will need nabumetone and Cymbalta filled- happy to do this.   Patient is aware of the risks benefits side effects and monitoring requirements of biologic therapy including but not limited to disseminated tuberculosis recurrent serious infections suppression of the immune system, injection site reactions.      No follow-ups on file.      F/u 4 months with labs Q3 months    30 min consultation with greater than 50% spent in counseling, chart review and coordination of care. All questions answered.

## 2023-01-20 ENCOUNTER — TELEPHONE (OUTPATIENT)
Dept: RHEUMATOLOGY | Facility: CLINIC | Age: 73
End: 2023-01-20

## 2023-01-20 NOTE — TELEPHONE ENCOUNTER
----- Message from Cherelle Das sent at 1/20/2023 11:00 AM CST -----  Contact: patient  Type: Needs Medical Advice  Who Called:  patient  Best Call Back Number: [phone] (home)   Additional Information: patient requesting a call back to discuss the paperwork for her enbrel medication

## 2023-01-20 NOTE — TELEPHONE ENCOUNTER
LMOVM letting pt know that Ms Hankins handles this paperwork and that I will forward the message to her to update pt on status.    Dawna Khan MA  1/20/2023

## 2023-01-23 NOTE — TELEPHONE ENCOUNTER
Patient notified will be getting new PA from [facility] and then will be able to work on assistance form. CG

## 2023-01-25 ENCOUNTER — SPECIALTY PHARMACY (OUTPATIENT)
Dept: PHARMACY | Facility: CLINIC | Age: 73
End: 2023-01-25

## 2023-01-25 NOTE — TELEPHONE ENCOUNTER
Enbrel continuation, order received for PA renewal and PAP.    Outgoing call to [employer] who informed me that I am unable to complete a PA at this time. [employer] will  on 23 and transitional fill will carry her through this time.

## 2023-01-25 NOTE — TELEPHONE ENCOUNTER
Hello, this is Manda Godinez with [facility].  We are working on your prescription that your doctor has sent us. We will be working with your insurance to get this approved for you. We will be calling you along the way with updates on your medication.  If you have any questions, you can reach us at [phone].    Welcome call outcome: Left voicemail  Will patient have new Medicare plan after 1/31/23? If so, I will need to wait until new plan is active to complete PA.

## 2023-01-25 NOTE — TELEPHONE ENCOUNTER
Patient called back and provided new insurance information. Added into [facility], will be active 2/1/23. Patient has been getting Enbrel through [email or web address] PAP. Has two doses on hand. Assigned h will follow up to submit PA once insurance active.

## 2023-02-02 NOTE — TELEPHONE ENCOUNTER
Jase REDMOND submitted.   [facility]: [id number]    Jase REDMOND approved from 1/1/23 to 12/31/23.   Case ID: [id number]

## 2023-02-02 NOTE — TELEPHONE ENCOUNTER
Benefits Investigation   Insurance name: [facility] Medicare Part D   Copay: $2255.83  LIS LVL: none    Forwarding to FA for PAP renewal.

## 2023-02-06 NOTE — TELEPHONE ENCOUNTER
Outgoing call to patient regarding Enbrel prescription we received. Informed patient Enbrel has been approved through insurance with a high copay. No grants are currently available at this time but am able to apply patient for  assistance. Patient provided consent, HH size, and annual income and requested to have pt portion sent via e-mail. Will continue to follow up.

## 2023-02-13 ENCOUNTER — TELEPHONE (OUTPATIENT)
Dept: RHEUMATOLOGY | Facility: CLINIC | Age: 73
End: 2023-02-13
Payer: MEDICARE

## 2023-02-14 NOTE — TELEPHONE ENCOUNTER
Status Check to [employer], application received but more income information is required. Obtained information from patient and submitted to [employer]. Will continue to follow

## 2023-02-14 NOTE — TELEPHONE ENCOUNTER
----- Message from Maria De Jesus Ch, Patient Care Assistant sent at 2/13/2023 10:56 AM CST -----  Contact: self  Pt don't  think she will qualify for the med etanercept (ENBREL SURECLICK) 50 mg/mL (1 mL). She is out of the med and would like a sub that she can afford [phone]  Thanks    [facility] that her Enbrel Sureclick has been authorized and waiting to hear about PAP program. No samples to give this week but assistance program may be contacting her soon.    In the meantime, will send the doctor concern about substitute being more affordable. CG

## 2023-02-15 NOTE — TELEPHONE ENCOUNTER
All the biologics will be the same.   She is on Hydroxychloroquine     She stopped arava in 2021 due to cost.   Methotrexate did not help her.   There is nothing cheaper, but if she cannot get Enbrel we will check into Humira or another biologic just need to finish Enbrel process.        She has been on Enbrel since 2021 and covered. Let's see the process through.

## 2023-02-15 NOTE — TELEPHONE ENCOUNTER
Gave verbal assurance we are working on approval and PAP program. Patient understands these take time. Reached out to [facility] for update. Patient out of medication. CG

## 2023-02-17 NOTE — TELEPHONE ENCOUNTER
Patient is approved for [employer] Patient Assistance Program as of 02/16/2023 through 12/31/2023 and will receive Enbrel  free of charge through the programs dispensing pharmacy, [facility]. Someone from the program's pharmacy will be reaching out to patient to coordinate their first shipment. Patient can also contact [employer] Patient Assistance Program at [phone] to check on the status of their prescription and schedule shipment for medication.        FOR DOCUMENTATION ONLY:  Financial Assistance for Enbrel is approved from 02/16/2023 to 12/31/2023  Source: Enbrel Patient Assistance Program  Case ID: [id number]

## 2023-03-28 ENCOUNTER — LAB VISIT (OUTPATIENT)
Dept: LAB | Facility: HOSPITAL | Age: 73
End: 2023-03-28
Attending: INTERNAL MEDICINE
Payer: MEDICARE

## 2023-03-28 DIAGNOSIS — E11.8 DIABETIC COMPLICATION: ICD-10-CM

## 2023-03-28 DIAGNOSIS — R63.5 ABNORMAL WEIGHT GAIN: Primary | ICD-10-CM

## 2023-03-28 DIAGNOSIS — E04.1 NONTOXIC UNINODULAR GOITER: ICD-10-CM

## 2023-03-28 DIAGNOSIS — M19.90 INFLAMMATORY ARTHRITIS: ICD-10-CM

## 2023-03-28 DIAGNOSIS — E78.2 MIXED HYPERLIPIDEMIA: ICD-10-CM

## 2023-03-28 DIAGNOSIS — Z79.4 ENCOUNTER FOR LONG-TERM (CURRENT) USE OF INSULIN: ICD-10-CM

## 2023-03-28 DIAGNOSIS — E55.9 AVITAMINOSIS D: ICD-10-CM

## 2023-03-28 DIAGNOSIS — L40.50 PSORIATIC ARTHRITIS: ICD-10-CM

## 2023-03-28 DIAGNOSIS — D84.9 IMMUNOSUPPRESSION: ICD-10-CM

## 2023-03-28 LAB
ALBUMIN SERPL BCP-MCNC: 3.7 G/DL (ref 3.5–5.2)
ALBUMIN/CREAT UR: 11.6 UG/MG (ref 0–30)
ALP SERPL-CCNC: 87 U/L (ref 55–135)
ALT SERPL W/O P-5'-P-CCNC: 31 U/L (ref 10–44)
ANION GAP SERPL CALC-SCNC: 10 MMOL/L (ref 8–16)
AST SERPL-CCNC: 29 U/L (ref 10–40)
BASOPHILS # BLD AUTO: 0.09 K/UL (ref 0–0.2)
BASOPHILS NFR BLD: 1.3 % (ref 0–1.9)
BILIRUB SERPL-MCNC: 0.5 MG/DL (ref 0.1–1)
BUN SERPL-MCNC: 24 MG/DL (ref 8–23)
CALCIUM SERPL-MCNC: 9.5 MG/DL (ref 8.7–10.5)
CHLORIDE SERPL-SCNC: 106 MMOL/L (ref 95–110)
CHOLEST SERPL-MCNC: 110 MG/DL (ref 120–199)
CHOLEST/HDLC SERPL: 2 {RATIO} (ref 2–5)
CO2 SERPL-SCNC: 27 MMOL/L (ref 23–29)
CREAT SERPL-MCNC: 0.7 MG/DL (ref 0.5–1.4)
CREAT UR-MCNC: 155 MG/DL (ref 15–325)
CRP SERPL-MCNC: 0.4 MG/L (ref 0–8.2)
DIFFERENTIAL METHOD: ABNORMAL
EOSINOPHIL # BLD AUTO: 0.2 K/UL (ref 0–0.5)
EOSINOPHIL NFR BLD: 2.8 % (ref 0–8)
ERYTHROCYTE [DISTWIDTH] IN BLOOD BY AUTOMATED COUNT: 12.9 % (ref 11.5–14.5)
ERYTHROCYTE [SEDIMENTATION RATE] IN BLOOD BY WESTERGREN METHOD: 17 MM/HR (ref 0–20)
EST. GFR  (NO RACE VARIABLE): >60 ML/MIN/1.73 M^2
ESTIMATED AVG GLUCOSE: 120 MG/DL (ref 68–131)
GLUCOSE SERPL-MCNC: 51 MG/DL (ref 70–110)
HBA1C MFR BLD: 5.8 % (ref 4–5.6)
HCT VFR BLD AUTO: 41.1 % (ref 37–48.5)
HDLC SERPL-MCNC: 55 MG/DL (ref 40–75)
HDLC SERPL: 50 % (ref 20–50)
HGB BLD-MCNC: 13.4 G/DL (ref 12–16)
IMM GRANULOCYTES # BLD AUTO: 0.02 K/UL (ref 0–0.04)
IMM GRANULOCYTES NFR BLD AUTO: 0.3 % (ref 0–0.5)
LDLC SERPL CALC-MCNC: 42.8 MG/DL (ref 63–159)
LYMPHOCYTES # BLD AUTO: 2.9 K/UL (ref 1–4.8)
LYMPHOCYTES NFR BLD: 41.5 % (ref 18–48)
MCH RBC QN AUTO: 31.1 PG (ref 27–31)
MCHC RBC AUTO-ENTMCNC: 32.6 G/DL (ref 32–36)
MCV RBC AUTO: 95 FL (ref 82–98)
MICROALBUMIN UR DL<=1MG/L-MCNC: 18 UG/ML
MONOCYTES # BLD AUTO: 0.6 K/UL (ref 0.3–1)
MONOCYTES NFR BLD: 8.9 % (ref 4–15)
NEUTROPHILS # BLD AUTO: 3.2 K/UL (ref 1.8–7.7)
NEUTROPHILS NFR BLD: 45.2 % (ref 38–73)
NONHDLC SERPL-MCNC: 55 MG/DL
NRBC BLD-RTO: 0 /100 WBC
PLATELET # BLD AUTO: 271 K/UL (ref 150–450)
PMV BLD AUTO: 11.1 FL (ref 9.2–12.9)
POTASSIUM SERPL-SCNC: 4.2 MMOL/L (ref 3.5–5.1)
PROT SERPL-MCNC: 7.2 G/DL (ref 6–8.4)
RBC # BLD AUTO: 4.31 M/UL (ref 4–5.4)
SODIUM SERPL-SCNC: 143 MMOL/L (ref 136–145)
TRIGL SERPL-MCNC: 61 MG/DL (ref 30–150)
WBC # BLD AUTO: 7.06 K/UL (ref 3.9–12.7)

## 2023-03-28 PROCEDURE — 85651 RBC SED RATE NONAUTOMATED: CPT | Mod: PO | Performed by: INTERNAL MEDICINE

## 2023-03-28 PROCEDURE — 82306 VITAMIN D 25 HYDROXY: CPT | Performed by: INTERNAL MEDICINE

## 2023-03-28 PROCEDURE — 80053 COMPREHEN METABOLIC PANEL: CPT | Performed by: INTERNAL MEDICINE

## 2023-03-28 PROCEDURE — 80061 LIPID PANEL: CPT | Performed by: INTERNAL MEDICINE

## 2023-03-28 PROCEDURE — 85025 COMPLETE CBC W/AUTO DIFF WBC: CPT | Performed by: INTERNAL MEDICINE

## 2023-03-28 PROCEDURE — 83036 HEMOGLOBIN GLYCOSYLATED A1C: CPT | Performed by: INTERNAL MEDICINE

## 2023-03-28 PROCEDURE — 86140 C-REACTIVE PROTEIN: CPT | Performed by: INTERNAL MEDICINE

## 2023-03-28 PROCEDURE — 82570 ASSAY OF URINE CREATININE: CPT | Performed by: INTERNAL MEDICINE

## 2023-03-28 PROCEDURE — 36415 COLL VENOUS BLD VENIPUNCTURE: CPT | Mod: PO | Performed by: INTERNAL MEDICINE

## 2023-03-29 LAB — 25(OH)D3+25(OH)D2 SERPL-MCNC: 58 NG/ML (ref 30–96)

## 2023-05-01 ENCOUNTER — OFFICE VISIT (OUTPATIENT)
Dept: RHEUMATOLOGY | Facility: CLINIC | Age: 73
End: 2023-05-01
Payer: MEDICARE

## 2023-05-01 VITALS
WEIGHT: 199.44 LBS | SYSTOLIC BLOOD PRESSURE: 137 MMHG | HEIGHT: 65 IN | DIASTOLIC BLOOD PRESSURE: 65 MMHG | HEART RATE: 75 BPM | BODY MASS INDEX: 33.23 KG/M2

## 2023-05-01 DIAGNOSIS — D84.821 IMMUNOSUPPRESSION DUE TO DRUG THERAPY: ICD-10-CM

## 2023-05-01 DIAGNOSIS — M70.62 GREATER TROCHANTERIC BURSITIS OF LEFT HIP: ICD-10-CM

## 2023-05-01 DIAGNOSIS — M05.742 RHEUMATOID ARTHRITIS INVOLVING BOTH HANDS WITH POSITIVE RHEUMATOID FACTOR: Primary | ICD-10-CM

## 2023-05-01 DIAGNOSIS — M62.830 SPASM OF LUMBAR PARASPINOUS MUSCLE: ICD-10-CM

## 2023-05-01 DIAGNOSIS — Z79.899 IMMUNOSUPPRESSION DUE TO DRUG THERAPY: ICD-10-CM

## 2023-05-01 DIAGNOSIS — M05.741 RHEUMATOID ARTHRITIS INVOLVING BOTH HANDS WITH POSITIVE RHEUMATOID FACTOR: Primary | ICD-10-CM

## 2023-05-01 DIAGNOSIS — M70.61 GREATER TROCHANTERIC BURSITIS OF RIGHT HIP: ICD-10-CM

## 2023-05-01 DIAGNOSIS — Z79.899 HIGH RISK MEDICATIONS (NOT ANTICOAGULANTS) LONG-TERM USE: ICD-10-CM

## 2023-05-01 PROCEDURE — 3075F SYST BP GE 130 - 139MM HG: CPT | Mod: CPTII,S$GLB,, | Performed by: INTERNAL MEDICINE

## 2023-05-01 PROCEDURE — 4010F ACE/ARB THERAPY RXD/TAKEN: CPT | Mod: CPTII,S$GLB,, | Performed by: INTERNAL MEDICINE

## 2023-05-01 PROCEDURE — 1125F PR PAIN SEVERITY QUANTIFIED, PAIN PRESENT: ICD-10-PCS | Mod: CPTII,S$GLB,, | Performed by: INTERNAL MEDICINE

## 2023-05-01 PROCEDURE — 1101F PT FALLS ASSESS-DOCD LE1/YR: CPT | Mod: CPTII,S$GLB,, | Performed by: INTERNAL MEDICINE

## 2023-05-01 PROCEDURE — 99215 OFFICE O/P EST HI 40 MIN: CPT | Mod: PN | Performed by: INTERNAL MEDICINE

## 2023-05-01 PROCEDURE — 99215 OFFICE O/P EST HI 40 MIN: CPT | Mod: 25,S$GLB,, | Performed by: INTERNAL MEDICINE

## 2023-05-01 PROCEDURE — 99215 PR OFFICE/OUTPT VISIT, EST, LEVL V, 40-54 MIN: ICD-10-PCS | Mod: 25,S$GLB,, | Performed by: INTERNAL MEDICINE

## 2023-05-01 PROCEDURE — 3008F PR BODY MASS INDEX (BMI) DOCUMENTED: ICD-10-PCS | Mod: CPTII,S$GLB,, | Performed by: INTERNAL MEDICINE

## 2023-05-01 PROCEDURE — 3044F HG A1C LEVEL LT 7.0%: CPT | Mod: CPTII,S$GLB,, | Performed by: INTERNAL MEDICINE

## 2023-05-01 PROCEDURE — 1159F MED LIST DOCD IN RCRD: CPT | Mod: CPTII,S$GLB,, | Performed by: INTERNAL MEDICINE

## 2023-05-01 PROCEDURE — 3288F PR FALLS RISK ASSESSMENT DOCUMENTED: ICD-10-PCS | Mod: CPTII,S$GLB,, | Performed by: INTERNAL MEDICINE

## 2023-05-01 PROCEDURE — 3075F PR MOST RECENT SYSTOLIC BLOOD PRESS GE 130-139MM HG: ICD-10-PCS | Mod: CPTII,S$GLB,, | Performed by: INTERNAL MEDICINE

## 2023-05-01 PROCEDURE — 3044F PR MOST RECENT HEMOGLOBIN A1C LEVEL <7.0%: ICD-10-PCS | Mod: CPTII,S$GLB,, | Performed by: INTERNAL MEDICINE

## 2023-05-01 PROCEDURE — 3078F DIAST BP <80 MM HG: CPT | Mod: CPTII,S$GLB,, | Performed by: INTERNAL MEDICINE

## 2023-05-01 PROCEDURE — 3078F PR MOST RECENT DIASTOLIC BLOOD PRESSURE < 80 MM HG: ICD-10-PCS | Mod: CPTII,S$GLB,, | Performed by: INTERNAL MEDICINE

## 2023-05-01 PROCEDURE — 3008F BODY MASS INDEX DOCD: CPT | Mod: CPTII,S$GLB,, | Performed by: INTERNAL MEDICINE

## 2023-05-01 PROCEDURE — 4010F PR ACE/ARB THEARPY RXD/TAKEN: ICD-10-PCS | Mod: CPTII,S$GLB,, | Performed by: INTERNAL MEDICINE

## 2023-05-01 PROCEDURE — 20610 LARGE JOINT ASPIRATION/INJECTION: R GREATER TROCHANTERIC BURSA: ICD-10-PCS | Mod: 50,S$GLB,, | Performed by: INTERNAL MEDICINE

## 2023-05-01 PROCEDURE — 1101F PR PT FALLS ASSESS DOC 0-1 FALLS W/OUT INJ PAST YR: ICD-10-PCS | Mod: CPTII,S$GLB,, | Performed by: INTERNAL MEDICINE

## 2023-05-01 PROCEDURE — 1125F AMNT PAIN NOTED PAIN PRSNT: CPT | Mod: CPTII,S$GLB,, | Performed by: INTERNAL MEDICINE

## 2023-05-01 PROCEDURE — 1159F PR MEDICATION LIST DOCUMENTED IN MEDICAL RECORD: ICD-10-PCS | Mod: CPTII,S$GLB,, | Performed by: INTERNAL MEDICINE

## 2023-05-01 PROCEDURE — 3288F FALL RISK ASSESSMENT DOCD: CPT | Mod: CPTII,S$GLB,, | Performed by: INTERNAL MEDICINE

## 2023-05-01 PROCEDURE — 20610 DRAIN/INJ JOINT/BURSA W/O US: CPT | Mod: 50,S$GLB,, | Performed by: INTERNAL MEDICINE

## 2023-05-01 RX ORDER — TIZANIDINE 2 MG/1
4 TABLET ORAL EVERY 6 HOURS PRN
Qty: 20 TABLET | Refills: 0 | Status: SHIPPED | OUTPATIENT
Start: 2023-05-01 | End: 2023-05-06

## 2023-05-01 RX ADMIN — METHYLPREDNISOLONE ACETATE 40 MG: 40 INJECTION, SUSPENSION INTRA-ARTICULAR; INTRALESIONAL; INTRAMUSCULAR; SOFT TISSUE at 01:05

## 2023-05-01 ASSESSMENT — ROUTINE ASSESSMENT OF PATIENT INDEX DATA (RAPID3)
TOTAL RAPID3 SCORE: 4.33
MDHAQ FUNCTION SCORE: 1.2
PSYCHOLOGICAL DISTRESS SCORE: 2.2
PATIENT GLOBAL ASSESSMENT SCORE: 1.5
FATIGUE SCORE: 1.1
PAIN SCORE: 7.5

## 2023-05-01 NOTE — PROCEDURES
Large Joint Aspiration/Injection: R greater trochanteric bursa    Date/Time: 5/1/2023 1:00 PM  Performed by: Beatriz Guevara DO  Authorized by: Beatriz Guevara, DO     Consent Done?:  Yes (Verbal)  Indications:  Arthritis  Site marked: the procedure site was marked    Timeout: prior to procedure the correct patient, procedure, and site was verified      Local anesthesia used?: Yes    Anesthesia:  Local infiltration  Local anesthetic:  Lidocaine 1% without epinephrine  Anesthetic total (ml):  3      Details:  Needle Size:  25 G  Ultrasonic Guidance for needle placement?: No    Approach:  Lateral  Location:  Hip  Site:  R greater trochanteric bursa  Medications:  40 mg methylPREDNISolone acetate 40 mg/mL  Patient tolerance:  Patient tolerated the procedure well with no immediate complications     Patient informed of the risks, benefits, side effects of corticosteroid injections including but not limited to skin hypopigmentation, atrophy, ineffectiveness and infection.

## 2023-05-01 NOTE — PROGRESS NOTES
Subjective:          Chief Complaint: Adina iL is a 72 y.o. female who had no chief complaint listed for this encounter.    HPI:    Patient is a 72-year-old female here for f/u of PsA vs EOA (+hx of psoriasis but no active plaques) and fibromyalgia.    Serologies: CCP negative,  rheumatoid factor negative.  Imaging with erosive changes at the capitate and DIP.  Patient with hx of hepatic abscess (multiple) with sepsis strep species. Unclear source of infection.  Having pain hands, knees despite TKA, Shoulders    Patient with new lateral hip, LBP. In November she missed part of her cruise for leg pain, did have RFA ( Dr. Carlton) no benefit that time. She previous did well with RFAs. Seen with Dr. De Los Santos no significant arthritis in hips noted. She states they have been painful at baseline but the last week following gardening flared hips (Right > Left). Did have toradol with Dr. Nelson last week 0% benefit.   She notes LBP, lateral and anterior thigh, and calf all painful. No numbness,     2/2023: patient messaged about getting more affordable mediation.     She is on Hydroxychloroquine    She stopped arava in 2021 due to cost.   Methotrexate did not help her.   There is nothing cheaper, but if she cannot get Enbrel we will check into Humira or another biologic just need to finish Enbrel process.    She has been on Enbrel since 2021 and covered. Let's see the process through.      When a recent CT chest and pulmonary function testing overall relatively stable from a lung perspective was given an albuterol inhaler noted to have calcium of the coronaries followed up with her cardiologist.- who felt the stress test was fine. ECHO was fine.     She was seen with Dr. Salazar she would a left carpal tunnel and a right ganglion cyst  and trigger finger release still having some pain in the right hand.   Current regimen:   Enbrel 7/2021-present   HCQ 200mg BID  Nabumetone 500mg BID     Previous  Arava 10mg daily-  discontinued  for cost    No significant benefit with MTX     Now receiving from Psych:   Gabapentin 300mg BID  Cymbalta 60mg BID-       past medical history for type 2 diabetes mellitus, hypertension, IBS, previous myocardial infarction and diverticulitis.  She has DIP pain, swelling and deformity. Some PIP, little MCP and some wrist tenderness. She has long hx of arthritis with bilateral TKA. She has hx of Lumbar DDD/DJD and cervical DDD with [facility]-Dr. Carlton. sulfate or move free. Recently started [place] water and feeling much better.   She is s/p CAD with stenting x 2 and sister with MI  at 45y/o.    Patient with some dry mouth, no dry eyes, no serositis, pleurisy, ILD. ? Psoriasis scalp.  [place] with cardiac work up no occlusive CAD.2017. Follows regularly with Cardiology        REVIEW OF SYSTEMS:    Review of Systems   Constitutional: Negative for fever, malaise/fatigue and weight loss.   HENT: Negative for sore throat.    Eyes: Negative for double vision, photophobia and redness.   Respiratory: Negative for cough, shortness of breath and wheezing.    Cardiovascular: Negative for chest pain, palpitations and orthopnea.   Gastrointestinal: Negative for abdominal pain, constipation and diarrhea.   Genitourinary: Negative for dysuria, hematuria and urgency.   Musculoskeletal: Positive for back pain and joint pain. Negative for myalgias.   Skin: Negative for rash.   Neurological: Negative for dizziness, tingling, focal weakness and headaches.   Endo/Heme/Allergies: Does not bruise/bleed easily.   Psychiatric/Behavioral: Negative for depression, hallucinations and suicidal ideas.               Objective:            Past Medical History:   Diagnosis Date    Acid reflux     Anxiety     Arthritis     CAD (coronary artery disease)     Stant    Diabetes mellitus     Fibromyalgia     Hypertension     IBS (irritable bowel syndrome)     Myocardial infarction     Ulcerative colitis      Family History    Problem Relation Age of Onset    Colon cancer Mother     Heart attack Father     Heart attack Sister     Diabetes Sister     Breast cancer Sister      Social History     Tobacco Use    Smoking status: Former     Types: Cigarettes     Quit date: 2005     Years since quittin.9    Smokeless tobacco: Never   Substance Use Topics    Alcohol use: No     Alcohol/week: 0.0 standard drinks    Drug use: No         Current Outpatient Medications on File Prior to Visit   Medication Sig Dispense Refill    albuterol (VENTOLIN HFA) 90 mcg/actuation inhaler Inhale 1-2 puffs into the lungs every 6 (six) hours as needed for Wheezing or Shortness of Breath (or before exercise). Rescue 18 g 3    allopurinoL (ZYLOPRIM) 100 MG tablet allopurinol 100 mg tablet      aspirin (ECOTRIN) 81 MG EC tablet aspirin low dose 81mg ec      blood sugar diagnostic Strp 1 strip by Misc.(Non-Drug; Combo Route) route 4 (four) times daily. 150 each 11    busPIRone (BUSPAR) 15 MG tablet       cartilage/collagen II/hyaluron (MOVE FREE ULTRA ORAL) Take 1 tablet by mouth.      cefadroxil (DURICEF) 500 MG Cap Take 1 capsule (500 mg total) by mouth every 12 (twelve) hours. (Patient not taking: Reported on 2022) 14 capsule 0    cholecalciferol, vitamin D3, (VITAMIN D3) 10 mcg (400 unit) Tab Take 1 capsule by mouth.      cholestyramine (QUESTRAN) 4 gram packet Dissolve 1 packet in water twice a day      co-enzyme Q-10 30 mg capsule Take 100 mg by mouth once daily.      diclofenac sodium (VOLTAREN) 1 % Gel diclofenac 1 % topical gel      diphenoxylate-atropine 2.5-0.025 mg (LOMOTIL) 2.5-0.025 mg per tablet   1    dulaglutide (TRULICITY) 1.5 mg/0.5 mL pen injector Inject 1.5 mg into the skin.      DULoxetine (CYMBALTA) 60 MG capsule TK 1 C PO BID (Patient not taking: Reported on 2022) 180 capsule 2    etanercept (ENBREL SURECLICK) 50 mg/mL (1 mL) Inject 1 mL (50 mg total) into the skin once a week. 4 each 11    exenatide microspheres (BYDUREON)  "2 mg/0.65 mL PnIj Inject into the skin every 7 days.      furosemide (LASIX) 40 MG tablet Take 40 mg by mouth as needed.      gabapentin (NEURONTIN) 300 MG capsule Take 1 capsule (300 mg total) by mouth 2 (two) times daily. 180 capsule 3    glucosamine-chondroitin 500-400 mg tablet Take 1 tablet by mouth once daily.      HYDROcodone-acetaminophen (NORCO) 5-325 mg per tablet Take 1 tablet by mouth every 6 (six) hours as needed for Pain. 28 tablet 0    hydrOXYchloroQUINE (PLAQUENIL) 200 mg tablet Take 1 tablet (200 mg total) by mouth 2 (two) times daily. 180 tablet 3    INFUSION SET-INSULIN PUMP BODY MISC by Misc.(Non-Drug; Combo Route) route. VINNIE set to replace all insulin      insulin aspart U-100 (NOVOLOG) 100 unit/mL injection Inject 14 Units into the skin 3 (three) times daily before meals. 6 vial 3    insulin syringe-needle U-100 (BD INSULIN SYRINGE ULT-FINE II) 1 mL 31 gauge x 5/16 Syrg 1 applicator by Misc.(Non-Drug; Combo Route) route 4 (four) times daily. 400 each 3    krill-om3-dha-epa-om6-lip-astx 1,500-165-67.5 mg Cap Take by mouth once daily.      lamoTRIgine (LAMICTAL) 100 MG tablet Take 100 mg by mouth.      lancets 31 gauge Misc 1 lancet by Misc.(Non-Drug; Combo Route) route 4 (four) times daily. 200 each 4    LANTUS SOLOSTAR U-100 INSULIN glargine 100 units/mL (3mL) SubQ pen       LEVEMIR U-100 INSULIN 100 unit/mL injection INJECT 75 UNITS INTO THE SKIN EVERY EVENING. 10 mL 1    levothyroxine (SYNTHROID) 88 MCG tablet       losartan (COZAAR) 50 MG tablet losartan 50 mg tablet      metoprolol succinate (TOPROL-XL) 50 MG 24 hr tablet       mirtazapine (REMERON) 15 MG tablet Take 15 mg by mouth.      multivitamin (THERAGRAN) per tablet Take 1 tablet by mouth once daily.      nabumetone (RELAFEN) 500 MG tablet       nitroGLYCERIN (NITROSTAT) 0.4 MG SL tablet PLACE ONE TABLET UNDER THE TONGUE EVERY 5 MINUTES as needed for chest pain  1    pen needle, diabetic 32 gauge x 5/32" Ndle       pravastatin " (PRAVACHOL) 20 MG tablet Take 20 mg by mouth once daily.      traZODone (DESYREL) 100 MG tablet Take 100 mg by mouth nightly.      TRULICITY 1.5 mg/0.5 mL PnIj INJECT ONE pen SUBCUTANEOUSLY once a week  6    XIFAXAN 550 mg Tab 1 tablet 3 times a day for 2 weeks (42 tablets)       No current facility-administered medications on file prior to visit.       There were no vitals filed for this visit.      Physical Exam:    Physical Exam   Constitutional: She appears well-developed and well-nourished.   HENT:   Nose: No septal deviation.   Mouth/Throat: Mucous membranes are normal. No oral lesions.   Eyes: Pupils are equal, round, and reactive to light. Right conjunctiva is not injected. Left conjunctiva is not injected.   Neck: No JVD present. No thyroid mass and no thyromegaly present.   Cardiovascular: Normal rate, regular rhythm and normal pulses.   No edema   Pulmonary/Chest: Effort normal and breath sounds normal.   Abdominal: Soft. Normal appearance. There is no hepatosplenomegaly.   Musculoskeletal:        Right shoulder: She exhibits normal range of motion, no tenderness and no swelling.        Left shoulder: She exhibits normal range of motion, no tenderness and no swelling.        Right elbow: She exhibits normal range of motion and no swelling. No tenderness found.        Left elbow: She exhibits normal range of motion and no swelling. No tenderness found.        Right wrist: She exhibits normal range of motion, no tenderness and no swelling.        Left wrist: She exhibits normal range of motion, no tenderness and no swelling.        Right hip: She exhibits normal range of motion, normal strength and no swelling.        Left hip: She exhibits normal range of motion, no tenderness and no swelling.        Right knee: She exhibits normal range of motion and no swelling. No tenderness found.        Left knee: She exhibits normal range of motion and no swelling. No tenderness found.        Right ankle: She  exhibits normal range of motion and no swelling. No tenderness.        Left ankle: She exhibits normal range of motion and no swelling. No tenderness.        Right hand: She exhibits decreased range of motion, tenderness and deformity.        Left hand: She exhibits decreased range of motion, tenderness and deformity.        Left foot: There is tenderness and swelling.   Patient is a DIP bony hypertrophy with deformity particular the second DIP bilaterally.  She has slight erythema at the third fourth and fifth right DIP   Visible swelling right wrist and 3rd and 4th fingers she has difficulty with finger curl  strength reduced .  +++tenderness at the left ischial bursa.    Lymphadenopathy:     She has no cervical adenopathy.     She has no axillary adenopathy.   Neurological: She has normal strength and normal reflexes.   Skin: Skin is dry and intact.   Psychiatric: She has a normal mood and affect.               Assessment:       Encounter Diagnoses   Name Primary?    Rheumatoid arthritis involving both hands with positive rheumatoid factor Yes    Immunosuppression due to drug therapy     High risk medications (not anticoagulants) long-term use     Greater trochanteric bursitis of left hip     Greater trochanteric bursitis of right hip     Spasm of lumbar paraspinous muscle               Plan:        Rheumatoid arthritis involving both hands with positive rheumatoid factor  -     CBC Auto Differential; Standing; Expected date: 05/01/2023  -     Comprehensive Metabolic Panel; Standing; Expected date: 05/01/2023  -     Sedimentation rate; Standing; Expected date: 05/01/2023  -     C-Reactive Protein; Standing; Expected date: 05/01/2023    Immunosuppression due to drug therapy  -     CBC Auto Differential; Standing; Expected date: 05/01/2023  -     Comprehensive Metabolic Panel; Standing; Expected date: 05/01/2023  -     Sedimentation rate; Standing; Expected date: 05/01/2023  -     C-Reactive Protein; Standing;  Expected date: 05/01/2023    High risk medications (not anticoagulants) long-term use  -     CBC Auto Differential; Standing; Expected date: 05/01/2023  -     Comprehensive Metabolic Panel; Standing; Expected date: 05/01/2023  -     Sedimentation rate; Standing; Expected date: 05/01/2023  -     C-Reactive Protein; Standing; Expected date: 05/01/2023    Greater trochanteric bursitis of left hip  -     tiZANidine (ZANAFLEX) 2 MG tablet; Take 2 tablets (4 mg total) by mouth every 6 (six) hours as needed (muslce spasm).  Dispense: 20 tablet; Refill: 0  -     Large Joint Aspiration/Injection: L greater trochanteric bursa    Greater trochanteric bursitis of right hip  -     tiZANidine (ZANAFLEX) 2 MG tablet; Take 2 tablets (4 mg total) by mouth every 6 (six) hours as needed (muslce spasm).  Dispense: 20 tablet; Refill: 0  -     Large Joint Aspiration/Injection: R greater trochanteric bursa    Spasm of lumbar paraspinous muscle  -     tiZANidine (ZANAFLEX) 2 MG tablet; Take 2 tablets (4 mg total) by mouth every 6 (six) hours as needed (muslce spasm).  Dispense: 20 tablet; Refill: 0        PsA: Rapid 3: 4.33 today, previous 2.72.   Hydroxychloroquine 200mg BID  Enbrel started 8/2021 and  doing better overall.     Labs recent: reviewed     Receiving from Psych               - Duloxetine (Cymbalta) 60mg BID              - Continue Gabapentin 300mg BID       Patient is aware of the risks benefits side effects and monitoring requirements of biologic therapy including but not limited to disseminated tuberculosis recurrent serious infections suppression of the immune system, injection site reactions.      No follow-ups on file.      F/u 4 months with labs Q3 months    40min consultation with greater than 50% of that time included Preparing to see the patient (review records, tests), Obtaining and/or reviewing separately obtained historical data, Performing a medically appropriate examination and/or evaluation , Ordering  medications, tests, and/or procedures, Referring and communicating with other healthcare professionals , Documenting clinical information in the electronic or other health record and Independently interpreting results  (as warranted) & communicating results to the patient/family/caregiver. All questions answered.

## 2023-05-21 RX ORDER — METHYLPREDNISOLONE ACETATE 40 MG/ML
40 INJECTION, SUSPENSION INTRA-ARTICULAR; INTRALESIONAL; INTRAMUSCULAR; SOFT TISSUE
Status: DISCONTINUED | OUTPATIENT
Start: 2023-05-01 | End: 2023-05-21 | Stop reason: HOSPADM

## 2023-05-22 NOTE — PROCEDURES
Large Joint Aspiration/Injection: L greater trochanteric bursa    Date/Time: 5/1/2023 1:00 PM  Performed by: Beatriz Guevara DO  Authorized by: Beatriz Guevara, DO     Consent Done?:  Yes (Verbal)  Site marked: the procedure site was marked    Timeout: prior to procedure the correct patient, procedure, and site was verified      Local anesthesia used?: Yes    Anesthesia:  Local infiltration  Local anesthetic:  Lidocaine 1% without epinephrine  Anesthetic total (ml):  3      Details:  Needle Size:  25 G  Ultrasonic Guidance for needle placement?: No    Approach:  Lateral  Location:  Hip  Site:  L greater trochanteric bursa  Medications:  40 mg methylPREDNISolone acetate 40 mg/mL  Patient tolerance:  Patient tolerated the procedure well with no immediate complications     Patient informed of the risks, benefits, side effects of corticosteroid injections including but not limited to skin hypopigmentation, atrophy, ineffectiveness and infection.

## 2023-06-28 ENCOUNTER — LAB VISIT (OUTPATIENT)
Dept: LAB | Facility: HOSPITAL | Age: 73
End: 2023-06-28
Attending: INTERNAL MEDICINE
Payer: MEDICARE

## 2023-06-28 DIAGNOSIS — Z79.899 HIGH RISK MEDICATIONS (NOT ANTICOAGULANTS) LONG-TERM USE: ICD-10-CM

## 2023-06-28 DIAGNOSIS — M05.742 RHEUMATOID ARTHRITIS INVOLVING BOTH HANDS WITH POSITIVE RHEUMATOID FACTOR: ICD-10-CM

## 2023-06-28 DIAGNOSIS — M05.741 RHEUMATOID ARTHRITIS INVOLVING BOTH HANDS WITH POSITIVE RHEUMATOID FACTOR: ICD-10-CM

## 2023-06-28 DIAGNOSIS — Z79.899 IMMUNOSUPPRESSION DUE TO DRUG THERAPY: ICD-10-CM

## 2023-06-28 DIAGNOSIS — D84.821 IMMUNOSUPPRESSION DUE TO DRUG THERAPY: ICD-10-CM

## 2023-06-28 LAB
ALBUMIN SERPL BCP-MCNC: 3.6 G/DL (ref 3.5–5.2)
ALP SERPL-CCNC: 81 U/L (ref 55–135)
ALT SERPL W/O P-5'-P-CCNC: 23 U/L (ref 10–44)
ANION GAP SERPL CALC-SCNC: 10 MMOL/L (ref 8–16)
AST SERPL-CCNC: 24 U/L (ref 10–40)
BASOPHILS # BLD AUTO: 0.09 K/UL (ref 0–0.2)
BASOPHILS NFR BLD: 1.3 % (ref 0–1.9)
BILIRUB SERPL-MCNC: 0.5 MG/DL (ref 0.1–1)
BUN SERPL-MCNC: 20 MG/DL (ref 8–23)
CALCIUM SERPL-MCNC: 9.5 MG/DL (ref 8.7–10.5)
CHLORIDE SERPL-SCNC: 99 MMOL/L (ref 95–110)
CO2 SERPL-SCNC: 26 MMOL/L (ref 23–29)
CREAT SERPL-MCNC: 1 MG/DL (ref 0.5–1.4)
CRP SERPL-MCNC: 0.7 MG/L (ref 0–8.2)
DIFFERENTIAL METHOD: NORMAL
EOSINOPHIL # BLD AUTO: 0.3 K/UL (ref 0–0.5)
EOSINOPHIL NFR BLD: 4 % (ref 0–8)
ERYTHROCYTE [DISTWIDTH] IN BLOOD BY AUTOMATED COUNT: 12.6 % (ref 11.5–14.5)
ERYTHROCYTE [SEDIMENTATION RATE] IN BLOOD BY WESTERGREN METHOD: 22 MM/HR (ref 0–20)
EST. GFR  (NO RACE VARIABLE): >60 ML/MIN/1.73 M^2
GLUCOSE SERPL-MCNC: 190 MG/DL (ref 70–110)
HCT VFR BLD AUTO: 39.2 % (ref 37–48.5)
HGB BLD-MCNC: 13 G/DL (ref 12–16)
IMM GRANULOCYTES # BLD AUTO: 0.02 K/UL (ref 0–0.04)
IMM GRANULOCYTES NFR BLD AUTO: 0.3 % (ref 0–0.5)
LYMPHOCYTES # BLD AUTO: 2.7 K/UL (ref 1–4.8)
LYMPHOCYTES NFR BLD: 39.4 % (ref 18–48)
MCH RBC QN AUTO: 31 PG (ref 27–31)
MCHC RBC AUTO-ENTMCNC: 33.2 G/DL (ref 32–36)
MCV RBC AUTO: 94 FL (ref 82–98)
MONOCYTES # BLD AUTO: 0.8 K/UL (ref 0.3–1)
MONOCYTES NFR BLD: 11 % (ref 4–15)
NEUTROPHILS # BLD AUTO: 3 K/UL (ref 1.8–7.7)
NEUTROPHILS NFR BLD: 44 % (ref 38–73)
NRBC BLD-RTO: 0 /100 WBC
PLATELET # BLD AUTO: 252 K/UL (ref 150–450)
PMV BLD AUTO: 11.6 FL (ref 9.2–12.9)
POTASSIUM SERPL-SCNC: 4.5 MMOL/L (ref 3.5–5.1)
PROT SERPL-MCNC: 7.2 G/DL (ref 6–8.4)
RBC # BLD AUTO: 4.19 M/UL (ref 4–5.4)
SODIUM SERPL-SCNC: 135 MMOL/L (ref 136–145)
WBC # BLD AUTO: 6.83 K/UL (ref 3.9–12.7)

## 2023-06-28 PROCEDURE — 85651 RBC SED RATE NONAUTOMATED: CPT | Mod: PO | Performed by: INTERNAL MEDICINE

## 2023-06-28 PROCEDURE — 85025 COMPLETE CBC W/AUTO DIFF WBC: CPT | Performed by: INTERNAL MEDICINE

## 2023-06-28 PROCEDURE — 36415 COLL VENOUS BLD VENIPUNCTURE: CPT | Mod: PO | Performed by: INTERNAL MEDICINE

## 2023-06-28 PROCEDURE — 86140 C-REACTIVE PROTEIN: CPT | Performed by: INTERNAL MEDICINE

## 2023-06-28 PROCEDURE — 80053 COMPREHEN METABOLIC PANEL: CPT | Performed by: INTERNAL MEDICINE

## 2023-08-14 ENCOUNTER — TELEPHONE (OUTPATIENT)
Dept: RHEUMATOLOGY | Facility: CLINIC | Age: 73
End: 2023-08-14
Payer: MEDICARE

## 2023-08-14 NOTE — TELEPHONE ENCOUNTER
----- Message from Eli Bullock sent at 8/14/2023  9:53 AM CDT -----  Contact: self  Type:  Needs Medical Advice    Who Called: Pt    Would the patient rather a call back or a response via [facility]? call  Best Call Back Number: [phone]    Additional Information: Pt is having back surgery (putting a cage around bulging disc) and the hospital wants to know if pt can continue taking  etanercept (ENBREL SURECLICK) 50 mg/mL (1 mL)  If not pt would be missing a weeks worth of medication... Surgery date is 8/17    Please call to advise... Thank you...

## 2023-08-14 NOTE — TELEPHONE ENCOUNTER
----- Message from Flori Caraballo sent at 8/14/2023  3:05 PM CDT -----  Contact: pt  Type: Needs Medical Advice  Who Called:  pt  Best Call Back Number: [phone]    Additional Information: Pt is calling the office needs to reschedule appt.Please call back and advise.

## 2023-08-14 NOTE — TELEPHONE ENCOUNTER
Yes, I hold 2 weeks after surgery spine surgeries and longer if she is having trouble with her wound healing.   Thanks Dr. BUSTAMANTE

## 2023-09-21 ENCOUNTER — OFFICE VISIT (OUTPATIENT)
Dept: RHEUMATOLOGY | Facility: CLINIC | Age: 73
End: 2023-09-21
Payer: MEDICARE

## 2023-09-21 VITALS
BODY MASS INDEX: 34.2 KG/M2 | SYSTOLIC BLOOD PRESSURE: 123 MMHG | HEIGHT: 65 IN | HEART RATE: 76 BPM | WEIGHT: 205.25 LBS | DIASTOLIC BLOOD PRESSURE: 65 MMHG

## 2023-09-21 DIAGNOSIS — R53.83 MALAISE AND FATIGUE: ICD-10-CM

## 2023-09-21 DIAGNOSIS — D84.821 IMMUNOSUPPRESSION DUE TO DRUG THERAPY: ICD-10-CM

## 2023-09-21 DIAGNOSIS — Z79.899 HIGH RISK MEDICATIONS (NOT ANTICOAGULANTS) LONG-TERM USE: ICD-10-CM

## 2023-09-21 DIAGNOSIS — M19.90 INFLAMMATORY ARTHRITIS: ICD-10-CM

## 2023-09-21 DIAGNOSIS — L40.50 PSORIATIC ARTHRITIS: ICD-10-CM

## 2023-09-21 DIAGNOSIS — Z98.1 S/P CERVICAL SPINAL FUSION: ICD-10-CM

## 2023-09-21 DIAGNOSIS — M05.741 RHEUMATOID ARTHRITIS INVOLVING BOTH HANDS WITH POSITIVE RHEUMATOID FACTOR: Primary | ICD-10-CM

## 2023-09-21 DIAGNOSIS — R53.81 MALAISE AND FATIGUE: ICD-10-CM

## 2023-09-21 DIAGNOSIS — Z79.899 IMMUNOSUPPRESSION DUE TO DRUG THERAPY: ICD-10-CM

## 2023-09-21 DIAGNOSIS — M05.742 RHEUMATOID ARTHRITIS INVOLVING BOTH HANDS WITH POSITIVE RHEUMATOID FACTOR: Primary | ICD-10-CM

## 2023-09-21 PROCEDURE — 1160F RVW MEDS BY RX/DR IN RCRD: CPT | Mod: CPTII,S$GLB,, | Performed by: INTERNAL MEDICINE

## 2023-09-21 PROCEDURE — 99999 PR PBB SHADOW E&M-EST. PATIENT-LVL V: CPT | Mod: PBBFAC,,, | Performed by: INTERNAL MEDICINE

## 2023-09-21 PROCEDURE — 3074F PR MOST RECENT SYSTOLIC BLOOD PRESSURE < 130 MM HG: ICD-10-PCS | Mod: CPTII,S$GLB,, | Performed by: INTERNAL MEDICINE

## 2023-09-21 PROCEDURE — 4010F PR ACE/ARB THEARPY RXD/TAKEN: ICD-10-PCS | Mod: CPTII,S$GLB,, | Performed by: INTERNAL MEDICINE

## 2023-09-21 PROCEDURE — 3074F SYST BP LT 130 MM HG: CPT | Mod: CPTII,S$GLB,, | Performed by: INTERNAL MEDICINE

## 2023-09-21 PROCEDURE — 1159F MED LIST DOCD IN RCRD: CPT | Mod: CPTII,S$GLB,, | Performed by: INTERNAL MEDICINE

## 2023-09-21 PROCEDURE — 3078F DIAST BP <80 MM HG: CPT | Mod: CPTII,S$GLB,, | Performed by: INTERNAL MEDICINE

## 2023-09-21 PROCEDURE — 1100F PR PT FALLS ASSESS DOC 2+ FALLS/FALL W/INJURY/YR: ICD-10-PCS | Mod: CPTII,S$GLB,, | Performed by: INTERNAL MEDICINE

## 2023-09-21 PROCEDURE — 3078F PR MOST RECENT DIASTOLIC BLOOD PRESSURE < 80 MM HG: ICD-10-PCS | Mod: CPTII,S$GLB,, | Performed by: INTERNAL MEDICINE

## 2023-09-21 PROCEDURE — 3008F PR BODY MASS INDEX (BMI) DOCUMENTED: ICD-10-PCS | Mod: CPTII,S$GLB,, | Performed by: INTERNAL MEDICINE

## 2023-09-21 PROCEDURE — 99215 OFFICE O/P EST HI 40 MIN: CPT | Mod: S$GLB,,, | Performed by: INTERNAL MEDICINE

## 2023-09-21 PROCEDURE — 1100F PTFALLS ASSESS-DOCD GE2>/YR: CPT | Mod: CPTII,S$GLB,, | Performed by: INTERNAL MEDICINE

## 2023-09-21 PROCEDURE — 99999 PR PBB SHADOW E&M-EST. PATIENT-LVL V: ICD-10-PCS | Mod: PBBFAC,,, | Performed by: INTERNAL MEDICINE

## 2023-09-21 PROCEDURE — 3008F BODY MASS INDEX DOCD: CPT | Mod: CPTII,S$GLB,, | Performed by: INTERNAL MEDICINE

## 2023-09-21 PROCEDURE — 3288F FALL RISK ASSESSMENT DOCD: CPT | Mod: CPTII,S$GLB,, | Performed by: INTERNAL MEDICINE

## 2023-09-21 PROCEDURE — 1159F PR MEDICATION LIST DOCUMENTED IN MEDICAL RECORD: ICD-10-PCS | Mod: CPTII,S$GLB,, | Performed by: INTERNAL MEDICINE

## 2023-09-21 PROCEDURE — 1125F PR PAIN SEVERITY QUANTIFIED, PAIN PRESENT: ICD-10-PCS | Mod: CPTII,S$GLB,, | Performed by: INTERNAL MEDICINE

## 2023-09-21 PROCEDURE — 4010F ACE/ARB THERAPY RXD/TAKEN: CPT | Mod: CPTII,S$GLB,, | Performed by: INTERNAL MEDICINE

## 2023-09-21 PROCEDURE — 3044F PR MOST RECENT HEMOGLOBIN A1C LEVEL <7.0%: ICD-10-PCS | Mod: CPTII,S$GLB,, | Performed by: INTERNAL MEDICINE

## 2023-09-21 PROCEDURE — 3288F PR FALLS RISK ASSESSMENT DOCUMENTED: ICD-10-PCS | Mod: CPTII,S$GLB,, | Performed by: INTERNAL MEDICINE

## 2023-09-21 PROCEDURE — 1125F AMNT PAIN NOTED PAIN PRSNT: CPT | Mod: CPTII,S$GLB,, | Performed by: INTERNAL MEDICINE

## 2023-09-21 PROCEDURE — 99215 PR OFFICE/OUTPT VISIT, EST, LEVL V, 40-54 MIN: ICD-10-PCS | Mod: S$GLB,,, | Performed by: INTERNAL MEDICINE

## 2023-09-21 PROCEDURE — 3044F HG A1C LEVEL LT 7.0%: CPT | Mod: CPTII,S$GLB,, | Performed by: INTERNAL MEDICINE

## 2023-09-21 PROCEDURE — 1160F PR REVIEW ALL MEDS BY PRESCRIBER/CLIN PHARMACIST DOCUMENTED: ICD-10-PCS | Mod: CPTII,S$GLB,, | Performed by: INTERNAL MEDICINE

## 2023-09-21 RX ORDER — HYDROXYCHLOROQUINE SULFATE 200 MG/1
200 TABLET, FILM COATED ORAL 2 TIMES DAILY
Qty: 180 TABLET | Refills: 3 | Status: SHIPPED | OUTPATIENT
Start: 2023-09-21 | End: 2024-02-20 | Stop reason: SDUPTHER

## 2023-09-21 RX ORDER — INSULIN GLARGINE AND LIXISENATIDE 100; 33 U/ML; UG/ML
INJECTION, SOLUTION SUBCUTANEOUS
COMMUNITY
Start: 2023-09-19

## 2023-09-21 ASSESSMENT — ROUTINE ASSESSMENT OF PATIENT INDEX DATA (RAPID3)
PSYCHOLOGICAL DISTRESS SCORE: 2.2
PATIENT GLOBAL ASSESSMENT SCORE: 2.5
FATIGUE SCORE: 2.2
MDHAQ FUNCTION SCORE: 1.1
TOTAL RAPID3 SCORE: 3.22
PAIN SCORE: 3.5

## 2023-09-21 NOTE — PROGRESS NOTES
Subjective:          Chief Complaint: Adina Li is a 73 y.o. female who had concerns including Disease Management.    HPI:    Patient is a 89year-old female here for f/u of PsA vs EOA (+hx of psoriasis but no active plaques) and fibromyalgia.    Serologies: CCP negative,  rheumatoid factor negative.  Imaging with erosive changes at the capitate and DIP.  Patient with hx of hepatic abscess (multiple) with sepsis strep species. Unclear source of infection.    9/2023: patient s/p cervical decompression with internal fixation. She is doing much better now post op 5 weeks.   Hands still stiff but better since Enbrel last labs reviewed with patient no evidence of toxicity.     5/2023  Patient with new lateral hip, LBP. In November she missed part of her cruise for leg pain, did have RFA ( Dr. Carlton) no benefit that time. She previous did well with RFAs. Seen with Dr. De Los Santos no significant arthritis in hips noted. She states they have been painful at baseline but the last week following gardening flared hips (Right > Left). Did have toradol with Dr. Nelson last week 0% benefit.   She notes LBP, lateral and anterior thigh, and calf all painful. No numbness,     2/2023: patient messaged about getting more affordable mediation.     She is on Hydroxychloroquine    She stopped arava in 2021 due to cost.   Methotrexate did not help her.   There is nothing cheaper, but if she cannot get Enbrel we will check into Humira or another biologic just need to finish Enbrel process.    She has been on Enbrel since 2021 and covered. Let's see the process through.      When a recent CT chest and pulmonary function testing overall relatively stable from a lung perspective was given an albuterol inhaler noted to have calcium of the coronaries followed up with her cardiologist.- who felt the stress test was fine. ECHO was fine.     She was seen with Dr. Salazar she would a left carpal tunnel and a right ganglion cyst  and trigger  finger release still having some pain in the right hand.   Current regimen:   Enbrel 2021-present   HCQ 200mg BID  Nabumetone 500mg BID     Previous  Arava 10mg daily- discontinued  for cost    No significant benefit with MTX     Now receiving from Psych:   Gabapentin 300mg BID  Cymbalta 60mg BID-       past medical history for type 2 diabetes mellitus, hypertension, IBS, previous myocardial infarction and diverticulitis.  She has DIP pain, swelling and deformity. Some PIP, little MCP and some wrist tenderness. She has long hx of arthritis with bilateral TKA. She has hx of Lumbar DDD/DJD and cervical DDD with [facility]-Dr. Carlton. sulfate or move free. Recently started [place] water and feeling much better.   She is s/p CAD with stenting x 2 and sister with MI  at 43y/o.    Patient with some dry mouth, no dry eyes, no serositis, pleurisy, ILD. ? Psoriasis scalp.  [facility] with cardiac work up no occlusive CAD.2017. Follows regularly with Cardiology        REVIEW OF SYSTEMS:    Review of Systems   Constitutional: Negative for fever, malaise/fatigue and weight loss.   HENT: Negative for sore throat.    Eyes: Negative for double vision, photophobia and redness.   Respiratory: Negative for cough, shortness of breath and wheezing.    Cardiovascular: Negative for chest pain, palpitations and orthopnea.   Gastrointestinal: Negative for abdominal pain, constipation and diarrhea.   Genitourinary: Negative for dysuria, hematuria and urgency.   Musculoskeletal: Positive for back pain and joint pain. Negative for myalgias.   Skin: Negative for rash.   Neurological: Negative for dizziness, tingling, focal weakness and headaches.   Endo/Heme/Allergies: Does not bruise/bleed easily.   Psychiatric/Behavioral: Negative for depression, hallucinations and suicidal ideas.               Objective:            Past Medical History:   Diagnosis Date    Acid reflux     Anxiety     Arthritis     CAD (coronary artery disease)      Adenlurdes    Diabetes mellitus     Fibromyalgia     Hypertension     IBS (irritable bowel syndrome)     Myocardial infarction     Ulcerative colitis      Family History   Problem Relation Age of Onset    Colon cancer Mother     Heart attack Father     Heart attack Sister     Diabetes Sister     Breast cancer Sister      Social History     Tobacco Use    Smoking status: Former     Current packs/day: 0.00     Types: Cigarettes     Quit date: 2005     Years since quittin.3    Smokeless tobacco: Never   Substance Use Topics    Alcohol use: No     Alcohol/week: 0.0 standard drinks of alcohol    Drug use: No         Current Outpatient Medications on File Prior to Visit   Medication Sig Dispense Refill    albuterol (VENTOLIN HFA) 90 mcg/actuation inhaler Inhale 1-2 puffs into the lungs every 6 (six) hours as needed for Wheezing or Shortness of Breath (or before exercise). Rescue 18 g 3    allopurinoL (ZYLOPRIM) 100 MG tablet allopurinol 100 mg tablet      aspirin (ECOTRIN) 81 MG EC tablet aspirin low dose 81mg ec      blood sugar diagnostic Strp 1 strip by Misc.(Non-Drug; Combo Route) route 4 (four) times daily. 150 each 11    busPIRone (BUSPAR) 15 MG tablet       cartilage/collagen II/hyaluron (MOVE FREE ULTRA ORAL) Take 1 tablet by mouth.      cholecalciferol, vitamin D3, (VITAMIN D3) 10 mcg (400 unit) Tab Take 1 capsule by mouth.      cholestyramine (QUESTRAN) 4 gram packet Dissolve 1 packet in water twice a day      co-enzyme Q-10 30 mg capsule Take 100 mg by mouth once daily.      diclofenac sodium (VOLTAREN) 1 % Gel diclofenac 1 % topical gel      diphenoxylate-atropine 2.5-0.025 mg (LOMOTIL) 2.5-0.025 mg per tablet   1    dulaglutide (TRULICITY) 1.5 mg/0.5 mL pen injector Inject 1.5 mg into the skin.      etanercept (ENBREL SURECLICK) 50 mg/mL (1 mL) Inject 1 mL (50 mg total) into the skin once a week. 4 each 11    exenatide microspheres (BYDUREON) 2 mg/0.65 mL PnIj Inject into the skin every 7 days.       "furosemide (LASIX) 40 MG tablet Take 40 mg by mouth as needed.      glucosamine-chondroitin 500-400 mg tablet Take 1 tablet by mouth once daily.      HYDROcodone-acetaminophen (NORCO) 5-325 mg per tablet Take 1 tablet by mouth every 6 (six) hours as needed for Pain. 28 tablet 0    hydrOXYchloroQUINE (PLAQUENIL) 200 mg tablet Take 1 tablet (200 mg total) by mouth 2 (two) times daily. 180 tablet 3    INFUSION SET-INSULIN PUMP BODY MISC by Misc.(Non-Drug; Combo Route) route. VINNIE set to replace all insulin      insulin aspart U-100 (NOVOLOG) 100 unit/mL injection Inject 14 Units into the skin 3 (three) times daily before meals. 6 vial 3    insulin syringe-needle U-100 (BD INSULIN SYRINGE ULT-FINE II) 1 mL 31 gauge x 5/16 Syrg 1 applicator by Misc.(Non-Drug; Combo Route) route 4 (four) times daily. 400 each 3    krill-om3-dha-epa-om6-lip-astx 1,500-165-67.5 mg Cap Take by mouth once daily.      lamoTRIgine (LAMICTAL) 100 MG tablet Take 100 mg by mouth.      lancets 31 gauge Misc 1 lancet by Misc.(Non-Drug; Combo Route) route 4 (four) times daily. 200 each 4    LANTUS SOLOSTAR U-100 INSULIN glargine 100 units/mL (3mL) SubQ pen       LEVEMIR U-100 INSULIN 100 unit/mL injection INJECT 75 UNITS INTO THE SKIN EVERY EVENING. 10 mL 1    levothyroxine (SYNTHROID) 88 MCG tablet       losartan (COZAAR) 50 MG tablet losartan 50 mg tablet      metoprolol succinate (TOPROL-XL) 50 MG 24 hr tablet       mirtazapine (REMERON) 15 MG tablet Take 15 mg by mouth.      multivitamin (THERAGRAN) per tablet Take 1 tablet by mouth once daily.      nabumetone (RELAFEN) 500 MG tablet       nitroGLYCERIN (NITROSTAT) 0.4 MG SL tablet PLACE ONE TABLET UNDER THE TONGUE EVERY 5 MINUTES as needed for chest pain  1    pen needle, diabetic 32 gauge x 5/32" Ndle       pravastatin (PRAVACHOL) 20 MG tablet Take 20 mg by mouth once daily.      SOLIQUA 100/33 100 unit-33 mcg/mL InPn pen Inject into the skin.      traZODone (DESYREL) 100 MG tablet Take 100 mg " by mouth nightly.      TRULICITY 1.5 mg/0.5 mL PnIj INJECT ONE pen SUBCUTANEOUSLY once a week  6    XIFAXAN 550 mg Tab 1 tablet 3 times a day for 2 weeks (42 tablets)      cefadroxil (DURICEF) 500 MG Cap Take 1 capsule (500 mg total) by mouth every 12 (twelve) hours. (Patient not taking: Reported on 9/1/2022) 14 capsule 0    DULoxetine (CYMBALTA) 60 MG capsule TK 1 C PO BID (Patient not taking: Reported on 12/29/2022) 180 capsule 2    gabapentin (NEURONTIN) 300 MG capsule Take 1 capsule (300 mg total) by mouth 2 (two) times daily. 180 capsule 3     No current facility-administered medications on file prior to visit.       Vitals:    09/21/23 1404   BP: 123/65   Pulse: 76         Physical Exam:    Physical Exam   Constitutional: She appears well-developed and well-nourished.   HENT:   Nose: No septal deviation.   Mouth/Throat: Mucous membranes are normal. No oral lesions.   Eyes: Pupils are equal, round, and reactive to light. Right conjunctiva is not injected. Left conjunctiva is not injected.   Neck: No JVD present. No thyroid mass and no thyromegaly present.   Cardiovascular: Normal rate, regular rhythm and normal pulses.   No edema   Pulmonary/Chest: Effort normal and breath sounds normal.   Abdominal: Soft. Normal appearance. There is no hepatosplenomegaly.   Musculoskeletal:        Right shoulder: She exhibits normal range of motion, no tenderness and no swelling.        Left shoulder: She exhibits normal range of motion, no tenderness and no swelling.        Right elbow: She exhibits normal range of motion and no swelling. No tenderness found.        Left elbow: She exhibits normal range of motion and no swelling. No tenderness found.        Right wrist: She exhibits normal range of motion, no tenderness and no swelling.        Left wrist: She exhibits normal range of motion, no tenderness and no swelling.        Right hip: She exhibits normal range of motion, normal strength and no swelling.        Left hip:  She exhibits normal range of motion, no tenderness and no swelling.        Right knee: She exhibits normal range of motion and no swelling. No tenderness found.        Left knee: She exhibits normal range of motion and no swelling. No tenderness found.        Right ankle: She exhibits normal range of motion and no swelling. No tenderness.        Left ankle: She exhibits normal range of motion and no swelling. No tenderness.        Right hand: She exhibits decreased range of motion, tenderness and deformity.        Left hand: She exhibits decreased range of motion, tenderness and deformity.        Left foot: There is tenderness and swelling.   Patient is a DIP bony hypertrophy with deformity particular the second DIP bilaterally.  She has slight erythema at the third fourth and fifth right DIP   Visible swelling right wrist and 3rd and 4th fingers she has difficulty with finger curl  strength reduced .  +++tenderness at the left ischial bursa.    Lymphadenopathy:     She has no cervical adenopathy.     She has no axillary adenopathy.   Neurological: She has normal strength and normal reflexes.   Skin: Skin is dry and intact.   Psychiatric: She has a normal mood and affect.               Assessment:       Encounter Diagnoses   Name Primary?    Rheumatoid arthritis involving both hands with positive rheumatoid factor Yes    High risk medications (not anticoagulants) long-term use     Immunosuppression due to drug therapy     S/P cervical spinal fusion     Inflammatory arthritis     Psoriatic arthritis     Malaise and fatigue                 Plan:        Rheumatoid arthritis involving both hands with positive rheumatoid factor  -     hydroxychloroquine (PLAQUENIL) 200 mg tablet; Take 1 tablet (200 mg total) by mouth 2 (two) times daily.  Dispense: 180 tablet; Refill: 3  -     CBC Auto Differential; Standing; Expected date: 09/21/2023  -     Comprehensive Metabolic Panel; Standing; Expected date: 09/21/2023  -      Sedimentation rate; Standing; Expected date: 09/21/2023  -     C-Reactive Protein; Standing; Expected date: 09/21/2023  -     CBC Auto Differential; Standing; Expected date: 09/21/2023  -     Comprehensive Metabolic Panel; Standing; Expected date: 09/21/2023  -     Sedimentation rate; Standing; Expected date: 09/21/2023  -     C-Reactive Protein; Standing; Expected date: 09/21/2023  -     Quantiferon Gold TB; Future; Expected date: 09/21/2023  -     Hepatitis Panel, Acute; Future; Expected date: 09/21/2023    High risk medications (not anticoagulants) long-term use  -     hydroxychloroquine (PLAQUENIL) 200 mg tablet; Take 1 tablet (200 mg total) by mouth 2 (two) times daily.  Dispense: 180 tablet; Refill: 3  -     CBC Auto Differential; Standing; Expected date: 09/21/2023  -     Comprehensive Metabolic Panel; Standing; Expected date: 09/21/2023  -     Sedimentation rate; Standing; Expected date: 09/21/2023  -     C-Reactive Protein; Standing; Expected date: 09/21/2023  -     CBC Auto Differential; Standing; Expected date: 09/21/2023  -     Comprehensive Metabolic Panel; Standing; Expected date: 09/21/2023  -     Sedimentation rate; Standing; Expected date: 09/21/2023  -     C-Reactive Protein; Standing; Expected date: 09/21/2023  -     Quantiferon Gold TB; Future; Expected date: 09/21/2023  -     Hepatitis Panel, Acute; Future; Expected date: 09/21/2023    Immunosuppression due to drug therapy  -     hydroxychloroquine (PLAQUENIL) 200 mg tablet; Take 1 tablet (200 mg total) by mouth 2 (two) times daily.  Dispense: 180 tablet; Refill: 3  -     CBC Auto Differential; Standing; Expected date: 09/21/2023  -     Comprehensive Metabolic Panel; Standing; Expected date: 09/21/2023  -     Sedimentation rate; Standing; Expected date: 09/21/2023  -     C-Reactive Protein; Standing; Expected date: 09/21/2023  -     CBC Auto Differential; Standing; Expected date: 09/21/2023  -     Comprehensive Metabolic Panel; Standing;  Expected date: 09/21/2023  -     Sedimentation rate; Standing; Expected date: 09/21/2023  -     C-Reactive Protein; Standing; Expected date: 09/21/2023  -     Quantiferon Gold TB; Future; Expected date: 09/21/2023  -     Hepatitis Panel, Acute; Future; Expected date: 09/21/2023    S/P cervical spinal fusion  Comments:  5 weeks post op    Inflammatory arthritis  -     hydroxychloroquine (PLAQUENIL) 200 mg tablet; Take 1 tablet (200 mg total) by mouth 2 (two) times daily.  Dispense: 180 tablet; Refill: 3    Psoriatic arthritis  Comments:  possible    Malaise and fatigue  -     Quantiferon Gold TB; Future; Expected date: 09/21/2023  -     Hepatitis Panel, Acute; Future; Expected date: 09/21/2023          PsA: Rapid 3: today 3.22;  4.33 (5/2023), previous 2.72.    Hydroxychloroquine 200mg BID  Enbrel started 8/2021 and  doing better overall.   Reviewed labs.     Labs recent: reviewed with patient she had more recent labs with Dr. Lam for preop few weeks ago. We will order next in 3 motnhs.     Receiving from Psych               - ok to discontinue Gabapentin       Patient is aware of the risks benefits side effects and monitoring requirements of biologic therapy including but not limited to disseminated tuberculosis recurrent serious infections suppression of the immune system, injection site reactions.  All vaccinations discussed we do rec COVID, flu and RSV as well as shingles.   Medical monitoring for toxic and immunosuppressant drugs with labs, imaging completed today.     No follow-ups on file.      F/u 4 months with labs Q3 months    40min consultation with greater than 50% of that time included Preparing to see the patient (review records, tests), Obtaining and/or reviewing separately obtained historical data, Performing a medically appropriate examination and/or evaluation , Ordering medications, tests, and/or procedures, Referring and communicating with other healthcare professionals , Documenting clinical  information in the electronic or other health record and Independently interpreting results  (as warranted) & communicating results to the patient/family/caregiver. All questions answered.

## 2023-12-07 ENCOUNTER — LAB VISIT (OUTPATIENT)
Dept: LAB | Facility: HOSPITAL | Age: 73
End: 2023-12-07
Payer: MEDICARE

## 2023-12-07 DIAGNOSIS — M05.742 RHEUMATOID ARTHRITIS INVOLVING BOTH HANDS WITH POSITIVE RHEUMATOID FACTOR: ICD-10-CM

## 2023-12-07 DIAGNOSIS — Z79.899 HIGH RISK MEDICATIONS (NOT ANTICOAGULANTS) LONG-TERM USE: ICD-10-CM

## 2023-12-07 DIAGNOSIS — Z79.899 IMMUNOSUPPRESSION DUE TO DRUG THERAPY: ICD-10-CM

## 2023-12-07 DIAGNOSIS — R53.81 MALAISE AND FATIGUE: ICD-10-CM

## 2023-12-07 DIAGNOSIS — R53.83 MALAISE AND FATIGUE: ICD-10-CM

## 2023-12-07 DIAGNOSIS — D84.821 IMMUNOSUPPRESSION DUE TO DRUG THERAPY: ICD-10-CM

## 2023-12-07 DIAGNOSIS — M05.741 RHEUMATOID ARTHRITIS INVOLVING BOTH HANDS WITH POSITIVE RHEUMATOID FACTOR: ICD-10-CM

## 2023-12-07 LAB
ALBUMIN SERPL BCP-MCNC: 3.7 G/DL (ref 3.5–5.2)
ALP SERPL-CCNC: 69 U/L (ref 55–135)
ALT SERPL W/O P-5'-P-CCNC: 18 U/L (ref 10–44)
ANION GAP SERPL CALC-SCNC: 13 MMOL/L (ref 8–16)
AST SERPL-CCNC: 29 U/L (ref 10–40)
BASOPHILS # BLD AUTO: 0.07 K/UL (ref 0–0.2)
BASOPHILS NFR BLD: 1.1 % (ref 0–1.9)
BILIRUB SERPL-MCNC: 0.5 MG/DL (ref 0.1–1)
BUN SERPL-MCNC: 26 MG/DL (ref 8–23)
CALCIUM SERPL-MCNC: 9.7 MG/DL (ref 8.7–10.5)
CHLORIDE SERPL-SCNC: 104 MMOL/L (ref 95–110)
CO2 SERPL-SCNC: 22 MMOL/L (ref 23–29)
CREAT SERPL-MCNC: 1 MG/DL (ref 0.5–1.4)
CRP SERPL-MCNC: 0.5 MG/L (ref 0–8.2)
DIFFERENTIAL METHOD: ABNORMAL
EOSINOPHIL # BLD AUTO: 0.2 K/UL (ref 0–0.5)
EOSINOPHIL NFR BLD: 3.6 % (ref 0–8)
ERYTHROCYTE [DISTWIDTH] IN BLOOD BY AUTOMATED COUNT: 13.1 % (ref 11.5–14.5)
ERYTHROCYTE [SEDIMENTATION RATE] IN BLOOD BY WESTERGREN METHOD: 31 MM/HR (ref 0–20)
EST. GFR  (NO RACE VARIABLE): >60 ML/MIN/1.73 M^2
GLUCOSE SERPL-MCNC: 68 MG/DL (ref 70–110)
HCT VFR BLD AUTO: 41.7 % (ref 37–48.5)
HGB BLD-MCNC: 14.2 G/DL (ref 12–16)
IMM GRANULOCYTES # BLD AUTO: 0.01 K/UL (ref 0–0.04)
IMM GRANULOCYTES NFR BLD AUTO: 0.2 % (ref 0–0.5)
LYMPHOCYTES # BLD AUTO: 2.3 K/UL (ref 1–4.8)
LYMPHOCYTES NFR BLD: 36.1 % (ref 18–48)
MCH RBC QN AUTO: 31.1 PG (ref 27–31)
MCHC RBC AUTO-ENTMCNC: 34.1 G/DL (ref 32–36)
MCV RBC AUTO: 91 FL (ref 82–98)
MONOCYTES # BLD AUTO: 0.7 K/UL (ref 0.3–1)
MONOCYTES NFR BLD: 11.1 % (ref 4–15)
NEUTROPHILS # BLD AUTO: 3.1 K/UL (ref 1.8–7.7)
NEUTROPHILS NFR BLD: 47.9 % (ref 38–73)
NRBC BLD-RTO: 0 /100 WBC
PLATELET # BLD AUTO: 266 K/UL (ref 150–450)
PMV BLD AUTO: 11.7 FL (ref 9.2–12.9)
POTASSIUM SERPL-SCNC: 4.2 MMOL/L (ref 3.5–5.1)
PROT SERPL-MCNC: 7.6 G/DL (ref 6–8.4)
RBC # BLD AUTO: 4.57 M/UL (ref 4–5.4)
SODIUM SERPL-SCNC: 139 MMOL/L (ref 136–145)
WBC # BLD AUTO: 6.38 K/UL (ref 3.9–12.7)

## 2023-12-07 PROCEDURE — 86480 TB TEST CELL IMMUN MEASURE: CPT | Performed by: INTERNAL MEDICINE

## 2023-12-07 PROCEDURE — 36415 COLL VENOUS BLD VENIPUNCTURE: CPT | Mod: PO | Performed by: INTERNAL MEDICINE

## 2023-12-07 PROCEDURE — 80053 COMPREHEN METABOLIC PANEL: CPT | Performed by: INTERNAL MEDICINE

## 2023-12-07 PROCEDURE — 86140 C-REACTIVE PROTEIN: CPT | Performed by: INTERNAL MEDICINE

## 2023-12-07 PROCEDURE — 85651 RBC SED RATE NONAUTOMATED: CPT | Mod: PO | Performed by: INTERNAL MEDICINE

## 2023-12-07 PROCEDURE — 80074 ACUTE HEPATITIS PANEL: CPT | Performed by: INTERNAL MEDICINE

## 2023-12-07 PROCEDURE — 85025 COMPLETE CBC W/AUTO DIFF WBC: CPT | Performed by: INTERNAL MEDICINE

## 2023-12-08 LAB
GAMMA INTERFERON BACKGROUND BLD IA-ACNC: 0.06 IU/ML
HAV IGM SERPL QL IA: NORMAL
HBV CORE IGM SERPL QL IA: NORMAL
HBV SURFACE AG SERPL QL IA: NORMAL
HCV AB SERPL QL IA: NORMAL
M TB IFN-G CD4+ BCKGRND COR BLD-ACNC: -0.01 IU/ML
M TB IFN-G CD4+ BCKGRND COR BLD-ACNC: -0.04 IU/ML
MITOGEN IGNF BCKGRD COR BLD-ACNC: 8.6 IU/ML
TB GOLD PLUS: NEGATIVE

## 2024-01-22 ENCOUNTER — TELEPHONE (OUTPATIENT)
Dept: RHEUMATOLOGY | Facility: CLINIC | Age: 74
End: 2024-01-22
Payer: MEDICARE

## 2024-02-20 ENCOUNTER — OFFICE VISIT (OUTPATIENT)
Dept: RHEUMATOLOGY | Facility: CLINIC | Age: 74
End: 2024-02-20
Payer: MEDICARE

## 2024-02-20 VITALS
DIASTOLIC BLOOD PRESSURE: 64 MMHG | SYSTOLIC BLOOD PRESSURE: 135 MMHG | HEIGHT: 65 IN | BODY MASS INDEX: 32.05 KG/M2 | HEART RATE: 77 BPM | WEIGHT: 192.38 LBS

## 2024-02-20 DIAGNOSIS — Z79.899 IMMUNOSUPPRESSION DUE TO DRUG THERAPY: ICD-10-CM

## 2024-02-20 DIAGNOSIS — Z79.899 HIGH RISK MEDICATIONS (NOT ANTICOAGULANTS) LONG-TERM USE: ICD-10-CM

## 2024-02-20 DIAGNOSIS — M19.90 INFLAMMATORY ARTHRITIS: ICD-10-CM

## 2024-02-20 DIAGNOSIS — M06.00 RHEUMATOID ARTHRITIS WITH NEGATIVE RHEUMATOID FACTOR, INVOLVING UNSPECIFIED SITE: Primary | ICD-10-CM

## 2024-02-20 DIAGNOSIS — D84.821 IMMUNOSUPPRESSION DUE TO DRUG THERAPY: ICD-10-CM

## 2024-02-20 PROCEDURE — 1100F PTFALLS ASSESS-DOCD GE2>/YR: CPT | Mod: CPTII,S$GLB,, | Performed by: INTERNAL MEDICINE

## 2024-02-20 PROCEDURE — 1160F RVW MEDS BY RX/DR IN RCRD: CPT | Mod: CPTII,S$GLB,, | Performed by: INTERNAL MEDICINE

## 2024-02-20 PROCEDURE — 3008F BODY MASS INDEX DOCD: CPT | Mod: CPTII,S$GLB,, | Performed by: INTERNAL MEDICINE

## 2024-02-20 PROCEDURE — 1125F AMNT PAIN NOTED PAIN PRSNT: CPT | Mod: CPTII,S$GLB,, | Performed by: INTERNAL MEDICINE

## 2024-02-20 PROCEDURE — 3075F SYST BP GE 130 - 139MM HG: CPT | Mod: CPTII,S$GLB,, | Performed by: INTERNAL MEDICINE

## 2024-02-20 PROCEDURE — 4010F ACE/ARB THERAPY RXD/TAKEN: CPT | Mod: CPTII,S$GLB,, | Performed by: INTERNAL MEDICINE

## 2024-02-20 PROCEDURE — 3288F FALL RISK ASSESSMENT DOCD: CPT | Mod: CPTII,S$GLB,, | Performed by: INTERNAL MEDICINE

## 2024-02-20 PROCEDURE — 1159F MED LIST DOCD IN RCRD: CPT | Mod: CPTII,S$GLB,, | Performed by: INTERNAL MEDICINE

## 2024-02-20 PROCEDURE — 99999 PR PBB SHADOW E&M-EST. PATIENT-LVL IV: CPT | Mod: PBBFAC,,, | Performed by: INTERNAL MEDICINE

## 2024-02-20 PROCEDURE — 99215 OFFICE O/P EST HI 40 MIN: CPT | Mod: S$GLB,,, | Performed by: INTERNAL MEDICINE

## 2024-02-20 PROCEDURE — 3078F DIAST BP <80 MM HG: CPT | Mod: CPTII,S$GLB,, | Performed by: INTERNAL MEDICINE

## 2024-02-20 RX ORDER — DULOXETIN HYDROCHLORIDE 60 MG/1
60 CAPSULE, DELAYED RELEASE ORAL
COMMUNITY
Start: 2023-10-17

## 2024-02-20 RX ORDER — SEMAGLUTIDE 0.68 MG/ML
INJECTION, SOLUTION SUBCUTANEOUS
COMMUNITY
Start: 2023-11-27

## 2024-02-20 RX ORDER — HYDROXYCHLOROQUINE SULFATE 200 MG/1
200 TABLET, FILM COATED ORAL 2 TIMES DAILY
Qty: 180 TABLET | Refills: 3 | Status: SHIPPED | OUTPATIENT
Start: 2024-02-20

## 2024-02-20 RX ORDER — FOLIC ACID 1 MG/1
TABLET ORAL
COMMUNITY

## 2024-02-20 RX ORDER — FLUTICASONE PROPIONATE 50 MCG
SPRAY, SUSPENSION (ML) NASAL
COMMUNITY

## 2024-02-20 ASSESSMENT — ROUTINE ASSESSMENT OF PATIENT INDEX DATA (RAPID3)
PATIENT GLOBAL ASSESSMENT SCORE: 6
PAIN SCORE: 2.5
PSYCHOLOGICAL DISTRESS SCORE: 2.2
MDHAQ FUNCTION SCORE: 0.9
FATIGUE SCORE: 1.1
TOTAL RAPID3 SCORE: 3.83

## 2024-02-20 NOTE — PROGRESS NOTES
Subjective:          Chief Complaint: Adina Li is a 73 y.o. female who had concerns including Disease Management.    HPI:    Patient is a 73  year-old female here for f/u of PsA vs EOA (+hx of psoriasis but no active plaques) and fibromyalgia.    Serologies: CCP negative,  rheumatoid factor negative.  Imaging with erosive changes at the capitate and DIP.  Patient with hx of hepatic abscess (multiple) with sepsis strep species. Unclear source of infection.    2/2024: Rapid 3 : 3.83  Off Enbrel - concern for allergic or ISR.   Joints stable with only HCQ 200mg BID>     9/2023: patient s/p cervical decompression with internal fixation. She is doing much better now post op 5 weeks.   Hands still stiff but better since Enbrel last labs reviewed with patient no evidence of toxicity.     5/2023  Patient with new lateral hip, LBP. In November she missed part of her cruise for leg pain, did have RFA ( Dr. Carlton) no benefit that time. She previous did well with RFAs. Seen with Dr. De Los Santos no significant arthritis in hips noted. She states they have been painful at baseline but the last week following gardening flared hips (Right > Left). Did have toradol with Dr. Nelson last week 0% benefit.   She notes LBP, lateral and anterior thigh, and calf all painful. No numbness,     2/2023: patient messaged about getting more affordable mediation.     She is on Hydroxychloroquine    She stopped arava in 2021 due to cost.   Methotrexate did not help her.   There is nothing cheaper, but if she cannot get Enbrel we will check into Humira or another biologic just need to finish Enbrel process.    She has been on Enbrel since 2021 and covered. Let's see the process through.      When a recent CT chest and pulmonary function testing overall relatively stable from a lung perspective was given an albuterol inhaler noted to have calcium of the coronaries followed up with her cardiologist.- who felt the stress test was fine. ECHO was  fine.     She was seen with Dr. Salazar she would a left carpal tunnel and a right ganglion cyst  and trigger finger release still having some pain in the right hand.   Current regimen:   Enbrel 2021-present   HCQ 200mg BID  Nabumetone 500mg BID     Previous  Arava 10mg daily- discontinued  for cost    No significant benefit with MTX     Now receiving from Psych:   Gabapentin 300mg BID  Cymbalta 60mg BID-       past medical history for type 2 diabetes mellitus, hypertension, IBS, previous myocardial infarction and diverticulitis.  She has DIP pain, swelling and deformity. Some PIP, little MCP and some wrist tenderness. She has long hx of arthritis with bilateral TKA. She has hx of Lumbar DDD/DJD and cervical DDD with [facility]-Dr. Carlton. sulfate or move free. Recently started [place] water and feeling much better.   She is s/p CAD with stenting x 2 and sister with MI  at 45y/o.    Patient with some dry mouth, no dry eyes, no serositis, pleurisy, ILD. ? Psoriasis scalp.  [facility] with cardiac work up no occlusive CAD.2017. Follows regularly with Cardiology        REVIEW OF SYSTEMS:    Review of Systems   Constitutional: Negative for fever, malaise/fatigue and weight loss.   HENT: Negative for sore throat.    Eyes: Negative for double vision, photophobia and redness.   Respiratory: Negative for cough, shortness of breath and wheezing.    Cardiovascular: Negative for chest pain, palpitations and orthopnea.   Gastrointestinal: Negative for abdominal pain, constipation and diarrhea.   Genitourinary: Negative for dysuria, hematuria and urgency.   Musculoskeletal: Positive for back pain and joint pain. Negative for myalgias.   Skin: Negative for rash.   Neurological: Negative for dizziness, tingling, focal weakness and headaches.   Endo/Heme/Allergies: Does not bruise/bleed easily.   Psychiatric/Behavioral: Negative for depression, hallucinations and suicidal ideas.               Objective:            Past  Medical History:   Diagnosis Date    Acid reflux     Anxiety     Arthritis     CAD (coronary artery disease)     Stant    Diabetes mellitus     Fibromyalgia     Hypertension     IBS (irritable bowel syndrome)     Myocardial infarction     Ulcerative colitis      Family History   Problem Relation Age of Onset    Colon cancer Mother     Heart attack Father     Heart attack Sister     Diabetes Sister     Breast cancer Sister      Social History     Tobacco Use    Smoking status: Former     Current packs/day: 0.00     Types: Cigarettes     Quit date: 2005     Years since quittin.8    Smokeless tobacco: Never   Substance Use Topics    Alcohol use: No     Alcohol/week: 0.0 standard drinks of alcohol    Drug use: No         Current Outpatient Medications on File Prior to Visit   Medication Sig Dispense Refill    albuterol (VENTOLIN HFA) 90 mcg/actuation inhaler Inhale 1-2 puffs into the lungs every 6 (six) hours as needed for Wheezing or Shortness of Breath (or before exercise). Rescue 18 g 3    allopurinoL (ZYLOPRIM) 100 MG tablet allopurinol 100 mg tablet      aspirin (ECOTRIN) 81 MG EC tablet aspirin low dose 81mg ec      blood sugar diagnostic Strp 1 strip by Misc.(Non-Drug; Combo Route) route 4 (four) times daily. 150 each 11    busPIRone (BUSPAR) 15 MG tablet       cartilage/collagen II/hyaluron (MOVE FREE ULTRA ORAL) Take 1 tablet by mouth.      cefadroxil (DURICEF) 500 MG Cap Take 1 capsule (500 mg total) by mouth every 12 (twelve) hours. (Patient not taking: Reported on 2022) 14 capsule 0    cholecalciferol, vitamin D3, (VITAMIN D3) 10 mcg (400 unit) Tab Take 1 capsule by mouth.      cholestyramine (QUESTRAN) 4 gram packet Dissolve 1 packet in water twice a day      co-enzyme Q-10 30 mg capsule Take 100 mg by mouth once daily.      diclofenac sodium (VOLTAREN) 1 % Gel diclofenac 1 % topical gel      diphenoxylate-atropine 2.5-0.025 mg (LOMOTIL) 2.5-0.025 mg per tablet   1    dulaglutide (TRULICITY)  1.5 mg/0.5 mL pen injector Inject 1.5 mg into the skin.      etanercept (ENBREL SURECLICK) 50 mg/mL (1 mL) Inject 1 mL (50 mg total) into the skin once a week. 4 each 11    exenatide microspheres (BYDUREON) 2 mg/0.65 mL PnIj Inject into the skin every 7 days.      furosemide (LASIX) 40 MG tablet Take 40 mg by mouth as needed.      glucosamine-chondroitin 500-400 mg tablet Take 1 tablet by mouth once daily.      HYDROcodone-acetaminophen (NORCO) 5-325 mg per tablet Take 1 tablet by mouth every 6 (six) hours as needed for Pain. 28 tablet 0    hydroxychloroquine (PLAQUENIL) 200 mg tablet Take 1 tablet (200 mg total) by mouth 2 (two) times daily. 180 tablet 3    INFUSION SET-INSULIN PUMP BODY MISC by Misc.(Non-Drug; Combo Route) route. VINNIE set to replace all insulin      insulin aspart U-100 (NOVOLOG) 100 unit/mL injection Inject 14 Units into the skin 3 (three) times daily before meals. 6 vial 3    insulin syringe-needle U-100 (BD INSULIN SYRINGE ULT-FINE II) 1 mL 31 gauge x 5/16 Syrg 1 applicator by Misc.(Non-Drug; Combo Route) route 4 (four) times daily. 400 each 3    krill-om3-dha-epa-om6-lip-astx 1,500-165-67.5 mg Cap Take by mouth once daily.      lamoTRIgine (LAMICTAL) 100 MG tablet Take 100 mg by mouth.      lancets 31 gauge Misc 1 lancet by Misc.(Non-Drug; Combo Route) route 4 (four) times daily. 200 each 4    LANTUS SOLOSTAR U-100 INSULIN glargine 100 units/mL (3mL) SubQ pen       LEVEMIR U-100 INSULIN 100 unit/mL injection INJECT 75 UNITS INTO THE SKIN EVERY EVENING. 10 mL 1    levothyroxine (SYNTHROID) 88 MCG tablet       losartan (COZAAR) 50 MG tablet losartan 50 mg tablet      metoprolol succinate (TOPROL-XL) 50 MG 24 hr tablet       mirtazapine (REMERON) 15 MG tablet Take 15 mg by mouth.      multivitamin (THERAGRAN) per tablet Take 1 tablet by mouth once daily.      nabumetone (RELAFEN) 500 MG tablet       nitroGLYCERIN (NITROSTAT) 0.4 MG SL tablet PLACE ONE TABLET UNDER THE TONGUE EVERY 5 MINUTES as  "needed for chest pain  1    pen needle, diabetic 32 gauge x 5/32" Ndle       pravastatin (PRAVACHOL) 20 MG tablet Take 20 mg by mouth once daily.      SOLIQUA 100/33 100 unit-33 mcg/mL InPn pen Inject into the skin.      traZODone (DESYREL) 100 MG tablet Take 100 mg by mouth nightly.      TRULICITY 1.5 mg/0.5 mL PnIj INJECT ONE pen SUBCUTANEOUSLY once a week  6    XIFAXAN 550 mg Tab 1 tablet 3 times a day for 2 weeks (42 tablets)       No current facility-administered medications on file prior to visit.       Vitals:    02/20/24 1307   BP: 135/64   Pulse: 77         Physical Exam:    Physical Exam   Constitutional: She appears well-developed and well-nourished.   HENT:   Nose: No septal deviation.   Mouth/Throat: Mucous membranes are normal. No oral lesions.   Eyes: Pupils are equal, round, and reactive to light. Right conjunctiva is not injected. Left conjunctiva is not injected.   Neck: No JVD present. No thyroid mass and no thyromegaly present.   Cardiovascular: Normal rate, regular rhythm and normal pulses.   No edema   Pulmonary/Chest: Effort normal and breath sounds normal.   Abdominal: Soft. Normal appearance. There is no hepatosplenomegaly.   Musculoskeletal:        Right shoulder: She exhibits normal range of motion, no tenderness and no swelling.        Left shoulder: She exhibits normal range of motion, no tenderness and no swelling.        Right elbow: She exhibits normal range of motion and no swelling. No tenderness found.        Left elbow: She exhibits normal range of motion and no swelling. No tenderness found.        Right wrist: She exhibits normal range of motion, no tenderness and no swelling.        Left wrist: She exhibits normal range of motion, no tenderness and no swelling.        Right hip: She exhibits normal range of motion, normal strength and no swelling.        Left hip: She exhibits normal range of motion, no tenderness and no swelling.        Right knee: She exhibits normal range of " motion and no swelling. No tenderness found.        Left knee: She exhibits normal range of motion and no swelling. No tenderness found.        Right ankle: She exhibits normal range of motion and no swelling. No tenderness.        Left ankle: She exhibits normal range of motion and no swelling. No tenderness.        Right hand: She exhibits decreased range of motion, tenderness and deformity.        Left hand: She exhibits decreased range of motion, tenderness and deformity.        Left foot: There is tenderness and swelling.   Patient is a DIP bony hypertrophy with deformity particular the second DIP bilaterally.  She has slight erythema at the third fourth and fifth right DIP   Visible swelling right wrist and 3rd and 4th fingers she has difficulty with finger curl  strength reduced .  +++tenderness at the left ischial bursa.    Lymphadenopathy:     She has no cervical adenopathy.     She has no axillary adenopathy.   Neurological: She has normal strength and normal reflexes.   Skin: Skin is dry and intact.   Psychiatric: She has a normal mood and affect.               Assessment:       Encounter Diagnoses   Name Primary?    Rheumatoid arthritis with negative rheumatoid factor, involving unspecified site Yes    Inflammatory arthritis     High risk medications (not anticoagulants) long-term use     Immunosuppression due to drug therapy                   Plan:        Rheumatoid arthritis with negative rheumatoid factor, involving unspecified site    Inflammatory arthritis  -     hydroxychloroquine (PLAQUENIL) 200 mg tablet; Take 1 tablet (200 mg total) by mouth 2 (two) times daily.  Dispense: 180 tablet; Refill: 3    High risk medications (not anticoagulants) long-term use  -     hydroxychloroquine (PLAQUENIL) 200 mg tablet; Take 1 tablet (200 mg total) by mouth 2 (two) times daily.  Dispense: 180 tablet; Refill: 3    Immunosuppression due to drug therapy  -     hydroxychloroquine (PLAQUENIL) 200 mg tablet;  Take 1 tablet (200 mg total) by mouth 2 (two) times daily.  Dispense: 180 tablet; Refill: 3      PsA: Rapid 3: today 3.22;  4.33 (5/2023), previous 2.72.    Hydroxychloroquine 200mg BID  Enbrel started 8/2021 and  doing better overall. - she stopped   Reviewed labs. 12/2023: Quant gold neg, hep neg.   No toxicity.     Receiving from Psych               - ok to discontinue Gabapentin     Patient is aware of the risks benefits side effects and monitoring requirements of biologic therapy including but not limited to disseminated tuberculosis recurrent serious infections suppression of the immune system, injection site reactions.    Medical monitoring for toxic and immunosuppressant drugs     No follow-ups on file.      F/u 4 months with labs Q3 months    40min consultation with greater than 50% of that time included Preparing to see the patient (review records, tests), Obtaining and/or reviewing separately obtained historical data, Performing a medically appropriate examination and/or evaluation , Ordering medications, tests, and/or procedures, Referring and communicating with other healthcare professionals , Documenting clinical information in the electronic or other health record and Independently interpreting results  (as warranted) & communicating results to the patient/family/caregiver. All questions answered.

## 2024-04-19 ENCOUNTER — TELEPHONE (OUTPATIENT)
Dept: RHEUMATOLOGY | Facility: CLINIC | Age: 74
End: 2024-04-19
Payer: MEDICARE

## 2024-04-19 NOTE — TELEPHONE ENCOUNTER
----- Message from Frannie Imani sent at 4/19/2024 10:12 AM CDT -----  Contact: self  Type:  RX Refill Request    Who Called:  pt  Refill or New Rx:  refill  RX Name and Strength:  hydroxychloroquine (PLAQUENIL) 200 mg tablet  Medication  Date: 2/20/2024 Department: [facility]ology Ordering/Authorizing: Beatriz Guevara, DO         How is the patient currently taking it? (ex. 1XDay):  as directed  Is this a 30 day or 90 day RX:  180  Preferred Pharmacy with phone number:      [facility] - [place], [place] - [address]  [address] [address]  Phone: [phone] Fax: [phone]       Local or Mail Order:  mail  Ordering Provider:  bahman Johnson Call Back Number:  [phone]   Additional Information:  please advise

## 2024-07-16 ENCOUNTER — LAB VISIT (OUTPATIENT)
Dept: LAB | Facility: HOSPITAL | Age: 74
End: 2024-07-16
Attending: INTERNAL MEDICINE
Payer: MEDICARE

## 2024-07-16 DIAGNOSIS — Z79.899 IMMUNOSUPPRESSION DUE TO DRUG THERAPY: ICD-10-CM

## 2024-07-16 DIAGNOSIS — E11.42 DIABETIC POLYNEUROPATHY: Primary | ICD-10-CM

## 2024-07-16 DIAGNOSIS — M05.742 RHEUMATOID ARTHRITIS INVOLVING BOTH HANDS WITH POSITIVE RHEUMATOID FACTOR: ICD-10-CM

## 2024-07-16 DIAGNOSIS — D84.821 IMMUNOSUPPRESSION DUE TO DRUG THERAPY: ICD-10-CM

## 2024-07-16 DIAGNOSIS — M05.741 RHEUMATOID ARTHRITIS INVOLVING BOTH HANDS WITH POSITIVE RHEUMATOID FACTOR: ICD-10-CM

## 2024-07-16 DIAGNOSIS — Z79.899 HIGH RISK MEDICATIONS (NOT ANTICOAGULANTS) LONG-TERM USE: ICD-10-CM

## 2024-07-16 DIAGNOSIS — Z79.4 ENCOUNTER FOR LONG-TERM (CURRENT) USE OF INSULIN: ICD-10-CM

## 2024-07-16 LAB
ALBUMIN SERPL BCP-MCNC: 3.5 G/DL (ref 3.5–5.2)
ALBUMIN SERPL BCP-MCNC: 3.5 G/DL (ref 3.5–5.2)
ALBUMIN/CREAT UR: 14.4 UG/MG (ref 0–30)
ALP SERPL-CCNC: 85 U/L (ref 55–135)
ALP SERPL-CCNC: 85 U/L (ref 55–135)
ALT SERPL W/O P-5'-P-CCNC: 27 U/L (ref 10–44)
ALT SERPL W/O P-5'-P-CCNC: 27 U/L (ref 10–44)
ANION GAP SERPL CALC-SCNC: 10 MMOL/L (ref 8–16)
ANION GAP SERPL CALC-SCNC: 10 MMOL/L (ref 8–16)
AST SERPL-CCNC: 25 U/L (ref 10–40)
AST SERPL-CCNC: 25 U/L (ref 10–40)
BASOPHILS # BLD AUTO: 0.07 K/UL (ref 0–0.2)
BASOPHILS # BLD AUTO: 0.07 K/UL (ref 0–0.2)
BASOPHILS NFR BLD: 0.9 % (ref 0–1.9)
BASOPHILS NFR BLD: 0.9 % (ref 0–1.9)
BILIRUB SERPL-MCNC: 0.4 MG/DL (ref 0.1–1)
BILIRUB SERPL-MCNC: 0.4 MG/DL (ref 0.1–1)
BUN SERPL-MCNC: 30 MG/DL (ref 8–23)
BUN SERPL-MCNC: 30 MG/DL (ref 8–23)
CALCIUM SERPL-MCNC: 9.7 MG/DL (ref 8.7–10.5)
CALCIUM SERPL-MCNC: 9.7 MG/DL (ref 8.7–10.5)
CHLORIDE SERPL-SCNC: 108 MMOL/L (ref 95–110)
CHLORIDE SERPL-SCNC: 108 MMOL/L (ref 95–110)
CHOLEST SERPL-MCNC: 112 MG/DL (ref 120–199)
CHOLEST/HDLC SERPL: 2.4 {RATIO} (ref 2–5)
CO2 SERPL-SCNC: 22 MMOL/L (ref 23–29)
CO2 SERPL-SCNC: 22 MMOL/L (ref 23–29)
CREAT SERPL-MCNC: 0.8 MG/DL (ref 0.5–1.4)
CREAT SERPL-MCNC: 0.8 MG/DL (ref 0.5–1.4)
CREAT UR-MCNC: 97 MG/DL (ref 15–325)
CRP SERPL-MCNC: 0.4 MG/L (ref 0–8.2)
CRP SERPL-MCNC: 0.4 MG/L (ref 0–8.2)
DIFFERENTIAL METHOD BLD: ABNORMAL
DIFFERENTIAL METHOD BLD: ABNORMAL
EOSINOPHIL # BLD AUTO: 0.2 K/UL (ref 0–0.5)
EOSINOPHIL # BLD AUTO: 0.2 K/UL (ref 0–0.5)
EOSINOPHIL NFR BLD: 3 % (ref 0–8)
EOSINOPHIL NFR BLD: 3 % (ref 0–8)
ERYTHROCYTE [DISTWIDTH] IN BLOOD BY AUTOMATED COUNT: 13.5 % (ref 11.5–14.5)
ERYTHROCYTE [DISTWIDTH] IN BLOOD BY AUTOMATED COUNT: 13.5 % (ref 11.5–14.5)
ERYTHROCYTE [SEDIMENTATION RATE] IN BLOOD BY WESTERGREN METHOD: 50 MM/HR (ref 0–20)
ERYTHROCYTE [SEDIMENTATION RATE] IN BLOOD BY WESTERGREN METHOD: 50 MM/HR (ref 0–20)
EST. GFR  (NO RACE VARIABLE): >60 ML/MIN/1.73 M^2
EST. GFR  (NO RACE VARIABLE): >60 ML/MIN/1.73 M^2
ESTIMATED AVG GLUCOSE: 111 MG/DL (ref 68–131)
GLUCOSE SERPL-MCNC: 101 MG/DL (ref 70–110)
GLUCOSE SERPL-MCNC: 101 MG/DL (ref 70–110)
HBA1C MFR BLD: 5.5 % (ref 4–5.6)
HCT VFR BLD AUTO: 40.3 % (ref 37–48.5)
HCT VFR BLD AUTO: 40.3 % (ref 37–48.5)
HDLC SERPL-MCNC: 47 MG/DL (ref 40–75)
HDLC SERPL: 42 % (ref 20–50)
HGB BLD-MCNC: 13.3 G/DL (ref 12–16)
HGB BLD-MCNC: 13.3 G/DL (ref 12–16)
IMM GRANULOCYTES # BLD AUTO: 0.02 K/UL (ref 0–0.04)
IMM GRANULOCYTES # BLD AUTO: 0.02 K/UL (ref 0–0.04)
IMM GRANULOCYTES NFR BLD AUTO: 0.3 % (ref 0–0.5)
IMM GRANULOCYTES NFR BLD AUTO: 0.3 % (ref 0–0.5)
LDLC SERPL CALC-MCNC: 49.8 MG/DL (ref 63–159)
LYMPHOCYTES # BLD AUTO: 2 K/UL (ref 1–4.8)
LYMPHOCYTES # BLD AUTO: 2 K/UL (ref 1–4.8)
LYMPHOCYTES NFR BLD: 26 % (ref 18–48)
LYMPHOCYTES NFR BLD: 26 % (ref 18–48)
MCH RBC QN AUTO: 31.3 PG (ref 27–31)
MCH RBC QN AUTO: 31.3 PG (ref 27–31)
MCHC RBC AUTO-ENTMCNC: 33 G/DL (ref 32–36)
MCHC RBC AUTO-ENTMCNC: 33 G/DL (ref 32–36)
MCV RBC AUTO: 95 FL (ref 82–98)
MCV RBC AUTO: 95 FL (ref 82–98)
MICROALBUMIN UR DL<=1MG/L-MCNC: 14 UG/ML
MONOCYTES # BLD AUTO: 0.7 K/UL (ref 0.3–1)
MONOCYTES # BLD AUTO: 0.7 K/UL (ref 0.3–1)
MONOCYTES NFR BLD: 8.8 % (ref 4–15)
MONOCYTES NFR BLD: 8.8 % (ref 4–15)
NEUTROPHILS # BLD AUTO: 4.6 K/UL (ref 1.8–7.7)
NEUTROPHILS # BLD AUTO: 4.6 K/UL (ref 1.8–7.7)
NEUTROPHILS NFR BLD: 61 % (ref 38–73)
NEUTROPHILS NFR BLD: 61 % (ref 38–73)
NONHDLC SERPL-MCNC: 65 MG/DL
NRBC BLD-RTO: 0 /100 WBC
NRBC BLD-RTO: 0 /100 WBC
PLATELET # BLD AUTO: 279 K/UL (ref 150–450)
PLATELET # BLD AUTO: 279 K/UL (ref 150–450)
PMV BLD AUTO: 11 FL (ref 9.2–12.9)
PMV BLD AUTO: 11 FL (ref 9.2–12.9)
POTASSIUM SERPL-SCNC: 4 MMOL/L (ref 3.5–5.1)
POTASSIUM SERPL-SCNC: 4 MMOL/L (ref 3.5–5.1)
PROT SERPL-MCNC: 7.1 G/DL (ref 6–8.4)
PROT SERPL-MCNC: 7.1 G/DL (ref 6–8.4)
RBC # BLD AUTO: 4.25 M/UL (ref 4–5.4)
RBC # BLD AUTO: 4.25 M/UL (ref 4–5.4)
SODIUM SERPL-SCNC: 140 MMOL/L (ref 136–145)
SODIUM SERPL-SCNC: 140 MMOL/L (ref 136–145)
T4 FREE SERPL-MCNC: 1.23 NG/DL (ref 0.71–1.51)
TRIGL SERPL-MCNC: 76 MG/DL (ref 30–150)
TSH SERPL DL<=0.005 MIU/L-ACNC: 0.43 UIU/ML (ref 0.4–4)
WBC # BLD AUTO: 7.58 K/UL (ref 3.9–12.7)
WBC # BLD AUTO: 7.58 K/UL (ref 3.9–12.7)

## 2024-07-16 PROCEDURE — 82570 ASSAY OF URINE CREATININE: CPT | Performed by: INTERNAL MEDICINE

## 2024-07-16 PROCEDURE — 84443 ASSAY THYROID STIM HORMONE: CPT | Performed by: INTERNAL MEDICINE

## 2024-07-16 PROCEDURE — 82043 UR ALBUMIN QUANTITATIVE: CPT | Performed by: INTERNAL MEDICINE

## 2024-07-16 PROCEDURE — 80053 COMPREHEN METABOLIC PANEL: CPT | Performed by: INTERNAL MEDICINE

## 2024-07-16 PROCEDURE — 86140 C-REACTIVE PROTEIN: CPT | Performed by: INTERNAL MEDICINE

## 2024-07-16 PROCEDURE — 85651 RBC SED RATE NONAUTOMATED: CPT | Mod: PO | Performed by: INTERNAL MEDICINE

## 2024-07-16 PROCEDURE — 36415 COLL VENOUS BLD VENIPUNCTURE: CPT | Mod: PO | Performed by: INTERNAL MEDICINE

## 2024-07-16 PROCEDURE — 83036 HEMOGLOBIN GLYCOSYLATED A1C: CPT | Performed by: INTERNAL MEDICINE

## 2024-07-16 PROCEDURE — 85025 COMPLETE CBC W/AUTO DIFF WBC: CPT | Performed by: INTERNAL MEDICINE

## 2024-07-16 PROCEDURE — 80061 LIPID PANEL: CPT | Performed by: INTERNAL MEDICINE

## 2024-07-16 PROCEDURE — 84439 ASSAY OF FREE THYROXINE: CPT | Performed by: INTERNAL MEDICINE

## 2024-07-23 ENCOUNTER — TELEPHONE (OUTPATIENT)
Dept: RHEUMATOLOGY | Facility: CLINIC | Age: 74
End: 2024-07-23
Payer: MEDICARE

## 2024-07-23 NOTE — TELEPHONE ENCOUNTER
Patient missed follow up appointment 7/22/24. Per her request she has been rescheduled to 8/21/24.

## 2024-07-23 NOTE — TELEPHONE ENCOUNTER
----- Message from Bambi Fan sent at 7/23/2024  8:25 AM CDT -----  Contact: Self  Type: Needs Medical Advice  Who Called:  Patient  Symptoms (please be specific):  She was notified that she missed an apt yesterday. She had it on her calendar for tomorrow 7/24 at 11:00. Please help her reschedule.  Best Call Back Number:  [phone]  Additional Information:  Please call the patient back at the phone number listed above to advise. Thank you!

## 2024-08-08 ENCOUNTER — TELEPHONE (OUTPATIENT)
Dept: RHEUMATOLOGY | Facility: CLINIC | Age: 74
End: 2024-08-08
Payer: MEDICARE

## 2024-08-21 ENCOUNTER — OFFICE VISIT (OUTPATIENT)
Dept: RHEUMATOLOGY | Facility: CLINIC | Age: 74
End: 2024-08-21
Payer: MEDICARE

## 2024-08-21 VITALS
BODY MASS INDEX: 27.92 KG/M2 | HEIGHT: 65 IN | HEART RATE: 75 BPM | DIASTOLIC BLOOD PRESSURE: 68 MMHG | SYSTOLIC BLOOD PRESSURE: 155 MMHG | WEIGHT: 167.56 LBS

## 2024-08-21 DIAGNOSIS — Z79.899 IMMUNOSUPPRESSION DUE TO DRUG THERAPY: ICD-10-CM

## 2024-08-21 DIAGNOSIS — D84.821 IMMUNOSUPPRESSION DUE TO DRUG THERAPY: ICD-10-CM

## 2024-08-21 DIAGNOSIS — M19.90 INFLAMMATORY ARTHRITIS: Primary | ICD-10-CM

## 2024-08-21 DIAGNOSIS — M79.7 FIBROMYALGIA: ICD-10-CM

## 2024-08-21 DIAGNOSIS — Z79.899 HIGH RISK MEDICATIONS (NOT ANTICOAGULANTS) LONG-TERM USE: ICD-10-CM

## 2024-08-21 PROCEDURE — 1100F PTFALLS ASSESS-DOCD GE2>/YR: CPT | Mod: CPTII,S$GLB,, | Performed by: INTERNAL MEDICINE

## 2024-08-21 PROCEDURE — 4010F ACE/ARB THERAPY RXD/TAKEN: CPT | Mod: CPTII,S$GLB,, | Performed by: INTERNAL MEDICINE

## 2024-08-21 PROCEDURE — 1159F MED LIST DOCD IN RCRD: CPT | Mod: CPTII,S$GLB,, | Performed by: INTERNAL MEDICINE

## 2024-08-21 PROCEDURE — 3078F DIAST BP <80 MM HG: CPT | Mod: CPTII,S$GLB,, | Performed by: INTERNAL MEDICINE

## 2024-08-21 PROCEDURE — 3077F SYST BP >= 140 MM HG: CPT | Mod: CPTII,S$GLB,, | Performed by: INTERNAL MEDICINE

## 2024-08-21 PROCEDURE — 1125F AMNT PAIN NOTED PAIN PRSNT: CPT | Mod: CPTII,S$GLB,, | Performed by: INTERNAL MEDICINE

## 2024-08-21 PROCEDURE — 1160F RVW MEDS BY RX/DR IN RCRD: CPT | Mod: CPTII,S$GLB,, | Performed by: INTERNAL MEDICINE

## 2024-08-21 PROCEDURE — 99215 OFFICE O/P EST HI 40 MIN: CPT | Mod: S$GLB,,, | Performed by: INTERNAL MEDICINE

## 2024-08-21 PROCEDURE — 3044F HG A1C LEVEL LT 7.0%: CPT | Mod: CPTII,S$GLB,, | Performed by: INTERNAL MEDICINE

## 2024-08-21 PROCEDURE — 3288F FALL RISK ASSESSMENT DOCD: CPT | Mod: CPTII,S$GLB,, | Performed by: INTERNAL MEDICINE

## 2024-08-21 PROCEDURE — 99999 PR PBB SHADOW E&M-EST. PATIENT-LVL V: CPT | Mod: PBBFAC,,, | Performed by: INTERNAL MEDICINE

## 2024-08-21 PROCEDURE — 3008F BODY MASS INDEX DOCD: CPT | Mod: CPTII,S$GLB,, | Performed by: INTERNAL MEDICINE

## 2024-08-21 ASSESSMENT — ROUTINE ASSESSMENT OF PATIENT INDEX DATA (RAPID3)
MDHAQ FUNCTION SCORE: 1.1
PAIN SCORE: 5
PATIENT GLOBAL ASSESSMENT SCORE: 5
TOTAL RAPID3 SCORE: 4.55
AM STIFFNESS SCORE: 0, NO

## 2024-08-21 NOTE — PROGRESS NOTES
Him/He Subjective:          Chief Complaint: Adina Li is a 74 y.o. female who had concerns including Disease Management.    HPI:    Patient is a 73  year-old female here for f/u of PsA vs EOA (+hx of psoriasis but no active plaques) and fibromyalgia.    Serologies: CCP negative,  rheumatoid factor negative.  Imaging with erosive changes at the capitate and DIP.  Patient with hx of hepatic abscess (multiple) with sepsis strep species. Unclear source of infection.    2/2024: Rapid 3 : 3.83  Off Enbrel - concern for allergic or ISR.   Joints stable with only HCQ 200mg BID>     9/2023: patient s/p cervical decompression with internal fixation. She is doing much better now post op 5 weeks.   Hands still stiff but better since Enbrel last labs reviewed with patient no evidence of toxicity.     5/2023  Patient with new lateral hip, LBP. In November she missed part of her cruise for leg pain, did have RFA ( Dr. Carlton) no benefit that time. She previous did well with RFAs. Seen with Dr. De Los Santos no significant arthritis in hips noted. She states they have been painful at baseline but the last week following gardening flared hips (Right > Left). Did have toradol with Dr. Nelson last week 0% benefit.   She notes LBP, lateral and anterior thigh, and calf all painful. No numbness,     2/2023: patient messaged about getting more affordable mediation.     She is on Hydroxychloroquine    She stopped arava in 2021 due to cost.   Methotrexate did not help her.   There is nothing cheaper, but if she cannot get Enbrel we will check into Humira or another biologic just need to finish Enbrel process.    She has been on Enbrel since 2021 and covered. Let's see the process through.      When a recent CT chest and pulmonary function testing overall relatively stable from a lung perspective was given an albuterol inhaler noted to have calcium of the coronaries followed up with her cardiologist.- who felt the stress test was fine. ECHO was  fine.     She was seen with Dr. Salazar she would a left carpal tunnel and a right ganglion cyst  and trigger finger release still having some pain in the right hand.   Current regimen:   Enbrel 2021-present   HCQ 200mg BID  Nabumetone 500mg BID     Previous  Arava 10mg daily- discontinued  for cost    No significant benefit with MTX     Now receiving from Psych:   Gabapentin 300mg BID  Cymbalta 60mg BID-       past medical history for type 2 diabetes mellitus, hypertension, IBS, previous myocardial infarction and diverticulitis.  She has DIP pain, swelling and deformity. Some PIP, little MCP and some wrist tenderness. She has long hx of arthritis with bilateral TKA. She has hx of Lumbar DDD/DJD and cervical DDD with [facility]-Dr. Carlton. sulfate or move free. Recently started [place] water and feeling much better.   She is s/p CAD with stenting x 2 and sister with MI  at 45y/o.    Patient with some dry mouth, no dry eyes, no serositis, pleurisy, ILD. ? Psoriasis scalp.  [facility] with cardiac work up no occlusive CAD.2017. Follows regularly with Cardiology        REVIEW OF SYSTEMS:    Review of Systems   Constitutional: Negative for fever, malaise/fatigue and weight loss.   HENT: Negative for sore throat.    Eyes: Negative for double vision, photophobia and redness.   Respiratory: Negative for cough, shortness of breath and wheezing.    Cardiovascular: Negative for chest pain, palpitations and orthopnea.   Gastrointestinal: Negative for abdominal pain, constipation and diarrhea.   Genitourinary: Negative for dysuria, hematuria and urgency.   Musculoskeletal: Positive for back pain and joint pain. Negative for myalgias.   Skin: Negative for rash.   Neurological: Negative for dizziness, tingling, focal weakness and headaches.   Endo/Heme/Allergies: Does not bruise/bleed easily.   Psychiatric/Behavioral: Negative for depression, hallucinations and suicidal ideas.               Objective:            Past  Medical History:   Diagnosis Date    Acid reflux     Anxiety     Arthritis     CAD (coronary artery disease)     Stant    Diabetes mellitus     Fibromyalgia     Hypertension     IBS (irritable bowel syndrome)     Myocardial infarction     Ulcerative colitis      Family History   Problem Relation Name Age of Onset    Colon cancer Mother      Heart attack Father      Heart attack Sister      Diabetes Sister      Breast cancer Sister       Social History     Tobacco Use    Smoking status: Former     Current packs/day: 0.00     Average packs/day: 1 pack/day for 47.3 years (47.3 ttl pk-yrs)     Types: Cigarettes     Start date:      Quit date: 2015     Years since quittin.2    Smokeless tobacco: Never   Substance Use Topics    Alcohol use: No     Alcohol/week: 0.0 standard drinks of alcohol    Drug use: No         Current Outpatient Medications on File Prior to Visit   Medication Sig Dispense Refill    albuterol (VENTOLIN HFA) 90 mcg/actuation inhaler Inhale 1-2 puffs into the lungs every 6 (six) hours as needed for Wheezing or Shortness of Breath (or before exercise). Rescue 18 g 3    allopurinoL (ZYLOPRIM) 100 MG tablet allopurinol 100 mg tablet      aspirin (ECOTRIN) 81 MG EC tablet aspirin low dose 81mg ec      blood sugar diagnostic Strp 1 strip by Misc.(Non-Drug; Combo Route) route 4 (four) times daily. 150 each 11    busPIRone (BUSPAR) 15 MG tablet       cartilage/collagen II/hyaluron (MOVE FREE ULTRA ORAL) Take 1 tablet by mouth.      cholecalciferol, vitamin D3, (VITAMIN D3) 10 mcg (400 unit) Tab Take 1 capsule by mouth.      cholestyramine (QUESTRAN) 4 gram packet Dissolve 1 packet in water twice a day      ciprofloxacin HCl (CIPRO) 250 MG tablet Take 250 mg by mouth 2 (two) times daily.      clotrimazole-betamethasone 1-0.05% (LOTRISONE) cream Apply topically 2 (two) times daily.      co-enzyme Q-10 30 mg capsule Take 100 mg by mouth once daily.      colestipoL (COLESTID) 1 gram Tab Take by mouth.       diclofenac sodium (VOLTAREN) 1 % Gel diclofenac 1 % topical gel      diphenoxylate-atropine 2.5-0.025 mg (LOMOTIL) 2.5-0.025 mg per tablet   1    dulaglutide (TRULICITY) 1.5 mg/0.5 mL pen injector Inject 1.5 mg into the skin.      DULoxetine (CYMBALTA) 60 MG capsule Take 60 mg by mouth.      EPINEPHrine (EPIPEN) 0.3 mg/0.3 mL AtIn INJECT 0.3 MLS (0.3 MG TOTAL) INTO THE MUSCLE ONE TIME FOR 1 DOSE 2 each 3    exenatide microspheres (BYDUREON) 2 mg/0.65 mL PnIj Inject into the skin every 7 days.      fluticasone propionate (FLONASE) 50 mcg/actuation nasal spray       folic acid (FOLVITE) 1 MG tablet       furosemide (LASIX) 40 MG tablet Take 40 mg by mouth as needed.      glucosamine-chondroitin 500-400 mg tablet Take 1 tablet by mouth once daily.      hydroxychloroquine (PLAQUENIL) 200 mg tablet Take 1 tablet (200 mg total) by mouth 2 (two) times daily. 180 tablet 3    INFUSION SET-INSULIN PUMP BODY MISC by Misc.(Non-Drug; Combo Route) route. VINNIE set to replace all insulin      insulin aspart U-100 (NOVOLOG) 100 unit/mL injection Inject 14 Units into the skin 3 (three) times daily before meals. 6 vial 3    insulin syringe-needle U-100 (BD INSULIN SYRINGE ULT-FINE II) 1 mL 31 gauge x 5/16 Syrg 1 applicator by Misc.(Non-Drug; Combo Route) route 4 (four) times daily. 400 each 3    krill-om3-dha-epa-om6-lip-astx 1,500-165-67.5 mg Cap Take by mouth once daily.      lamoTRIgine (LAMICTAL) 100 MG tablet Take 100 mg by mouth.      LANTUS SOLOSTAR U-100 INSULIN glargine 100 units/mL (3mL) SubQ pen       levothyroxine (SYNTHROID) 88 MCG tablet       losartan (COZAAR) 50 MG tablet losartan 50 mg tablet      metoprolol succinate (TOPROL-XL) 50 MG 24 hr tablet       mirtazapine (REMERON) 15 MG tablet Take 15 mg by mouth.      multivitamin (THERAGRAN) per tablet Take 1 tablet by mouth once daily.      nabumetone (RELAFEN) 500 MG tablet       nitroGLYCERIN (NITROSTAT) 0.4 MG SL tablet PLACE ONE TABLET UNDER THE TONGUE EVERY 5  "MINUTES as needed for chest pain  1    OZEMPIC 0.25 mg or 0.5 mg (2 mg/3 mL) pen injector       pen needle, diabetic 32 gauge x 5/32" Ndle       pravastatin (PRAVACHOL) 20 MG tablet Take 20 mg by mouth once daily.      SOLIQUA 100/33 100 unit-33 mcg/mL InPn pen Inject into the skin.      traZODone (DESYREL) 100 MG tablet Take 100 mg by mouth nightly.      triamcinolone acetonide 0.1% (KENALOG) 0.1 % cream       TRUE METRIX AIR GLUCOSE METER Misc use as directed      TRULICITY 1.5 mg/0.5 mL PnIj INJECT ONE pen SUBCUTANEOUSLY once a week  6    XIFAXAN 550 mg Tab 1 tablet 3 times a day for 2 weeks (42 tablets)      cefadroxil (DURICEF) 500 MG Cap Take 1 capsule (500 mg total) by mouth every 12 (twelve) hours. (Patient not taking: Reported on 9/1/2022) 14 capsule 0    HYDROcodone-acetaminophen (NORCO) 5-325 mg per tablet Take 1 tablet by mouth every 6 (six) hours as needed for Pain. (Patient not taking: Reported on 2/20/2024) 28 tablet 0    lancets 31 gauge Misc 1 lancet by Misc.(Non-Drug; Combo Route) route 4 (four) times daily. (Patient not taking: Reported on 2/20/2024) 200 each 4    LEVEMIR U-100 INSULIN 100 unit/mL injection INJECT 75 UNITS INTO THE SKIN EVERY EVENING. (Patient not taking: Reported on 2/20/2024) 10 mL 1     No current facility-administered medications on file prior to visit.       Vitals:    08/21/24 1525   BP: (!) 155/68   Pulse: 75         Physical Exam:    Physical Exam   Constitutional: She appears well-developed and well-nourished.   HENT:   Nose: No septal deviation.   Mouth/Throat: Mucous membranes are normal. No oral lesions.   Eyes: Pupils are equal, round, and reactive to light. Right conjunctiva is not injected. Left conjunctiva is not injected.   Neck: No JVD present. No thyroid mass and no thyromegaly present.   Cardiovascular: Normal rate, regular rhythm and normal pulses.   No edema   Pulmonary/Chest: Effort normal and breath sounds normal.   Abdominal: Soft. Normal appearance. There " is no hepatosplenomegaly.   Musculoskeletal:        Right shoulder: She exhibits normal range of motion, no tenderness and no swelling.        Left shoulder: She exhibits normal range of motion, no tenderness and no swelling.        Right elbow: She exhibits normal range of motion and no swelling. No tenderness found.        Left elbow: She exhibits normal range of motion and no swelling. No tenderness found.        Right wrist: She exhibits normal range of motion, no tenderness and no swelling.        Left wrist: She exhibits normal range of motion, no tenderness and no swelling.        Right hip: She exhibits normal range of motion, normal strength and no swelling.        Left hip: She exhibits normal range of motion, no tenderness and no swelling.        Right knee: She exhibits normal range of motion and no swelling. No tenderness found.        Left knee: She exhibits normal range of motion and no swelling. No tenderness found.        Right ankle: She exhibits normal range of motion and no swelling. No tenderness.        Left ankle: She exhibits normal range of motion and no swelling. No tenderness.        Right hand: She exhibits decreased range of motion, tenderness and deformity.        Left hand: She exhibits decreased range of motion, tenderness and deformity.        Left foot: There is tenderness and swelling.   Patient is a DIP bony hypertrophy with deformity particular the second DIP bilaterally.  She has slight erythema at the third fourth and fifth right DIP   Visible swelling right wrist and 3rd and 4th fingers she has difficulty with finger curl  strength reduced .  +++tenderness at the left ischial bursa.    Lymphadenopathy:     She has no cervical adenopathy.     She has no axillary adenopathy.   Neurological: She has normal strength and normal reflexes.   Skin: Skin is dry and intact.   Psychiatric: She has a normal mood and affect.               Assessment:       No diagnosis found.                  Plan:        There are no diagnoses linked to this encounter.    PsA: Rapid 3: 4.55 (8/2024)   3.22 (2/2024);  4.33 (5/2023), previous 2.72.    Hydroxychloroquine 200mg BID  Enbrel started 8/2021 and  doing better overall. - she stopped   She is noting increased joint aches and pains.   Reviewed labs. 12/2023: Quant gold neg, hep neg.   No toxicity.     Receiving from Psych               - ok to discontinue Gabapentin     Patient is aware of the risks benefits side effects and monitoring requirements of biologic therapy including but not limited to disseminated tuberculosis recurrent serious infections suppression of the immune system, injection site reactions.    Medical monitoring for toxic and immunosuppressant drugs     No follow-ups on file.      F/u 4 months with labs Q3 months    40min consultation with greater than 50% of that time included Preparing to see the patient (review records, tests), Obtaining and/or reviewing separately obtained historical data, Performing a medically appropriate examination and/or evaluation , Ordering medications, tests, and/or procedures, Referring and communicating with other healthcare professionals , Documenting clinical information in the electronic or other health record and Independently interpreting results  (as warranted) & communicating results to the patient/family/caregiver. All questions answered.

## 2024-11-28 DIAGNOSIS — Z79.899 HIGH RISK MEDICATIONS (NOT ANTICOAGULANTS) LONG-TERM USE: ICD-10-CM

## 2024-11-28 DIAGNOSIS — D84.821 IMMUNOSUPPRESSION DUE TO DRUG THERAPY: ICD-10-CM

## 2024-11-28 DIAGNOSIS — Z79.899 IMMUNOSUPPRESSION DUE TO DRUG THERAPY: ICD-10-CM

## 2024-11-28 DIAGNOSIS — M19.90 INFLAMMATORY ARTHRITIS: ICD-10-CM

## 2024-12-02 RX ORDER — HYDROXYCHLOROQUINE SULFATE 200 MG/1
200 TABLET, FILM COATED ORAL 2 TIMES DAILY
Qty: 180 TABLET | Refills: 3 | Status: SHIPPED | OUTPATIENT
Start: 2024-12-02

## 2024-12-16 ENCOUNTER — LAB VISIT (OUTPATIENT)
Dept: LAB | Facility: HOSPITAL | Age: 74
End: 2024-12-16
Attending: INTERNAL MEDICINE
Payer: MEDICARE

## 2024-12-16 DIAGNOSIS — Z79.899 HIGH RISK MEDICATIONS (NOT ANTICOAGULANTS) LONG-TERM USE: ICD-10-CM

## 2024-12-16 DIAGNOSIS — Z79.899 IMMUNOSUPPRESSION DUE TO DRUG THERAPY: ICD-10-CM

## 2024-12-16 DIAGNOSIS — D84.821 IMMUNOSUPPRESSION DUE TO DRUG THERAPY: ICD-10-CM

## 2024-12-16 DIAGNOSIS — M19.90 INFLAMMATORY ARTHRITIS: ICD-10-CM

## 2024-12-16 LAB
ALT SERPL W/O P-5'-P-CCNC: 12 U/L (ref 10–44)
AST SERPL-CCNC: 24 U/L (ref 10–40)
BASOPHILS # BLD AUTO: 0.06 K/UL (ref 0–0.2)
BASOPHILS NFR BLD: 0.8 % (ref 0–1.9)
CREAT SERPL-MCNC: 0.9 MG/DL (ref 0.5–1.4)
CRP SERPL-MCNC: 3.9 MG/L (ref 0–8.2)
DIFFERENTIAL METHOD BLD: ABNORMAL
EOSINOPHIL # BLD AUTO: 0.3 K/UL (ref 0–0.5)
EOSINOPHIL NFR BLD: 4.1 % (ref 0–8)
ERYTHROCYTE [DISTWIDTH] IN BLOOD BY AUTOMATED COUNT: 13.2 % (ref 11.5–14.5)
ERYTHROCYTE [SEDIMENTATION RATE] IN BLOOD BY WESTERGREN METHOD: 64 MM/HR (ref 0–20)
EST. GFR  (NO RACE VARIABLE): >60 ML/MIN/1.73 M^2
HCT VFR BLD AUTO: 41.1 % (ref 37–48.5)
HGB BLD-MCNC: 13 G/DL (ref 12–16)
IMM GRANULOCYTES # BLD AUTO: 0.03 K/UL (ref 0–0.04)
IMM GRANULOCYTES NFR BLD AUTO: 0.4 % (ref 0–0.5)
LYMPHOCYTES # BLD AUTO: 1.9 K/UL (ref 1–4.8)
LYMPHOCYTES NFR BLD: 26.6 % (ref 18–48)
MCH RBC QN AUTO: 31.4 PG (ref 27–31)
MCHC RBC AUTO-ENTMCNC: 31.6 G/DL (ref 32–36)
MCV RBC AUTO: 99 FL (ref 82–98)
MONOCYTES # BLD AUTO: 0.7 K/UL (ref 0.3–1)
MONOCYTES NFR BLD: 9.4 % (ref 4–15)
NEUTROPHILS # BLD AUTO: 4.2 K/UL (ref 1.8–7.7)
NEUTROPHILS NFR BLD: 58.7 % (ref 38–73)
NRBC BLD-RTO: 0 /100 WBC
PLATELET # BLD AUTO: 297 K/UL (ref 150–450)
PMV BLD AUTO: 11.3 FL (ref 9.2–12.9)
RBC # BLD AUTO: 4.14 M/UL (ref 4–5.4)
WBC # BLD AUTO: 7.15 K/UL (ref 3.9–12.7)

## 2024-12-16 PROCEDURE — 86140 C-REACTIVE PROTEIN: CPT | Performed by: INTERNAL MEDICINE

## 2024-12-16 PROCEDURE — 82565 ASSAY OF CREATININE: CPT | Performed by: INTERNAL MEDICINE

## 2024-12-16 PROCEDURE — 84450 TRANSFERASE (AST) (SGOT): CPT | Performed by: INTERNAL MEDICINE

## 2024-12-16 PROCEDURE — 85025 COMPLETE CBC W/AUTO DIFF WBC: CPT | Performed by: INTERNAL MEDICINE

## 2024-12-16 PROCEDURE — 85651 RBC SED RATE NONAUTOMATED: CPT | Mod: PO | Performed by: INTERNAL MEDICINE

## 2024-12-16 PROCEDURE — 84460 ALANINE AMINO (ALT) (SGPT): CPT | Performed by: INTERNAL MEDICINE

## 2024-12-16 PROCEDURE — 36415 COLL VENOUS BLD VENIPUNCTURE: CPT | Mod: PO | Performed by: INTERNAL MEDICINE

## 2025-03-25 ENCOUNTER — OFFICE VISIT (OUTPATIENT)
Dept: RHEUMATOLOGY | Facility: CLINIC | Age: 75
End: 2025-03-25
Payer: MEDICARE

## 2025-03-25 VITALS
HEIGHT: 65 IN | BODY MASS INDEX: 26.52 KG/M2 | DIASTOLIC BLOOD PRESSURE: 62 MMHG | WEIGHT: 159.19 LBS | SYSTOLIC BLOOD PRESSURE: 120 MMHG | HEART RATE: 83 BPM

## 2025-03-25 DIAGNOSIS — M10.9 GOUT, UNSPECIFIED CAUSE, UNSPECIFIED CHRONICITY, UNSPECIFIED SITE: ICD-10-CM

## 2025-03-25 DIAGNOSIS — M19.90 INFLAMMATORY ARTHRITIS: ICD-10-CM

## 2025-03-25 DIAGNOSIS — Z79.899 HIGH RISK MEDICATIONS (NOT ANTICOAGULANTS) LONG-TERM USE: ICD-10-CM

## 2025-03-25 DIAGNOSIS — L40.50 PSORIATIC ARTHRITIS: Primary | ICD-10-CM

## 2025-03-25 PROCEDURE — 3074F SYST BP LT 130 MM HG: CPT | Mod: CPTII,S$GLB,, | Performed by: INTERNAL MEDICINE

## 2025-03-25 PROCEDURE — 3078F DIAST BP <80 MM HG: CPT | Mod: CPTII,S$GLB,, | Performed by: INTERNAL MEDICINE

## 2025-03-25 PROCEDURE — 99214 OFFICE O/P EST MOD 30 MIN: CPT | Mod: S$GLB,,, | Performed by: INTERNAL MEDICINE

## 2025-03-25 PROCEDURE — 1125F AMNT PAIN NOTED PAIN PRSNT: CPT | Mod: CPTII,S$GLB,, | Performed by: INTERNAL MEDICINE

## 2025-03-25 PROCEDURE — 1100F PTFALLS ASSESS-DOCD GE2>/YR: CPT | Mod: CPTII,S$GLB,, | Performed by: INTERNAL MEDICINE

## 2025-03-25 PROCEDURE — 3288F FALL RISK ASSESSMENT DOCD: CPT | Mod: CPTII,S$GLB,, | Performed by: INTERNAL MEDICINE

## 2025-03-25 PROCEDURE — 1159F MED LIST DOCD IN RCRD: CPT | Mod: CPTII,S$GLB,, | Performed by: INTERNAL MEDICINE

## 2025-03-25 PROCEDURE — 3008F BODY MASS INDEX DOCD: CPT | Mod: CPTII,S$GLB,, | Performed by: INTERNAL MEDICINE

## 2025-03-25 PROCEDURE — 99999 PR PBB SHADOW E&M-EST. PATIENT-LVL IV: CPT | Mod: PBBFAC,,, | Performed by: INTERNAL MEDICINE

## 2025-03-25 RX ORDER — ALLOPURINOL 100 MG/1
100 TABLET ORAL DAILY
Qty: 90 TABLET | Refills: 3 | Status: SHIPPED | OUTPATIENT
Start: 2025-03-25 | End: 2026-03-25

## 2025-03-25 RX ORDER — LEFLUNOMIDE 20 MG/1
20 TABLET ORAL DAILY
Qty: 30 TABLET | Refills: 3 | Status: SHIPPED | OUTPATIENT
Start: 2025-03-25 | End: 2025-04-24

## 2025-03-25 ASSESSMENT — ROUTINE ASSESSMENT OF PATIENT INDEX DATA (RAPID3)
FATIGUE SCORE: 2.2
PAIN SCORE: 4
PSYCHOLOGICAL DISTRESS SCORE: 4.4
MDHAQ FUNCTION SCORE: 1.2
TOTAL RAPID3 SCORE: 4.33
PATIENT GLOBAL ASSESSMENT SCORE: 5

## 2025-03-25 NOTE — PROGRESS NOTES
Subjective:          Chief Complaint: Adina Li is a 74 y.o. female who had concerns including Disease Management.    HPI:    Patient is a 74  year-old female here for f/u of PsA vs EOA (+hx of psoriasis but no active plaques) and fibromyalgia.    Serologies: CCP negative,  rheumatoid factor negative.  Imaging with erosive changes at the capitate and DIP.  Patient with hx of hepatic abscess (multiple) with sepsis strep species. Unclear source of infection.    3/2025: Rapid 3 4.33.   Patient off Enbrel cost, copay, 2024 with ISR. She had a few samples from me in 8/2024. She is only on HCQ as of now. Hands with stiffness.   Patient of LFA since 2021  cost.   Continues on HCQ  ESR rising over time.     C/o hands with pain left 2nd toe with pain and swelling thinks this is gout. She is on a keto  Lumbar surgery Dr. Linton- 11.2024 lumbar fusion 2nd part. Doing better overall since surgery regarding pain radiating legs.     She hs lost 50# A1C doing better.       2/2024: Rapid 3 : 3.83  Off Enbrel - concern for allergic or ISR.   Joints stable with only HCQ 200mg BID>     9/2023: patient s/p cervical decompression with internal fixation. She is doing much better now post op 5 weeks.   Hands still stiff but better since Enbrel last labs reviewed with patient no evidence of toxicity.     5/2023  Patient with new lateral hip, LBP. In November she missed part of her cruise for leg pain, did have RFA ( Dr. Carlton) no benefit that time. She previous did well with RFAs. Seen with Dr. De Los Santos no significant arthritis in hips noted. She states they have been painful at baseline but the last week following gardening flared hips (Right > Left). Did have toradol with Dr. Nelson last week 0% benefit.   She notes LBP, lateral and anterior thigh, and calf all painful. No numbness,     2/2023: patient messaged about getting more affordable mediation.     She is on Hydroxychloroquine    She stopped arava in 2021 due to cost.    Methotrexate did not help her.   There is nothing cheaper, but if she cannot get Enbrel we will check into Humira or another biologic just need to finish Enbrel process.    She has been on Enbrel since  and covered. Let's see the process through.      When a recent CT chest and pulmonary function testing overall relatively stable from a lung perspective was given an albuterol inhaler noted to have calcium of the coronaries followed up with her cardiologist.- who felt the stress test was fine. ECHO was fine.     She was seen with Dr. Salazar she would a left carpal tunnel and a right ganglion cyst  and trigger finger release still having some pain in the right hand.   Current regimen:   Enbrel 2021-present   HCQ 200mg BID  Nabumetone 500mg BID     Previous  Arava 10mg daily- discontinued  for cost    No significant benefit with MTX     Now receiving from Psych:   Gabapentin 300mg BID  Cymbalta 60mg BID-       past medical history for type 2 diabetes mellitus, hypertension, IBS, previous myocardial infarction and diverticulitis.  She has DIP pain, swelling and deformity. Some PIP, little MCP and some wrist tenderness. She has long hx of arthritis with bilateral TKA. She has hx of Lumbar DDD/DJD and cervical DDD with [facility]-Dr. Carlton. sulfate or move free. Recently started [place] water and feeling much better.   She is s/p CAD with stenting x 2 and sister with MI  at 45y/o.    Patient with some dry mouth, no dry eyes, no serositis, pleurisy, ILD. ? Psoriasis scalp.  [facility] with cardiac work up no occlusive CAD.2017. Follows regularly with Cardiology        REVIEW OF SYSTEMS:    Review of Systems   Constitutional: Negative for fever, malaise/fatigue and weight loss.   HENT: Negative for sore throat.    Eyes: Negative for double vision, photophobia and redness.   Respiratory: Negative for cough, shortness of breath and wheezing.    Cardiovascular: Negative for chest pain, palpitations and  orthopnea.   Gastrointestinal: Negative for abdominal pain, constipation and diarrhea.   Genitourinary: Negative for dysuria, hematuria and urgency.   Musculoskeletal: Positive for back pain and joint pain. Negative for myalgias.   Skin: Negative for rash.   Neurological: Negative for dizziness, tingling, focal weakness and headaches.   Endo/Heme/Allergies: Does not bruise/bleed easily.   Psychiatric/Behavioral: Negative for depression, hallucinations and suicidal ideas.               Objective:            Past Medical History:   Diagnosis Date    Acid reflux     Anxiety     Arthritis     CAD (coronary artery disease)     Stant    Diabetes mellitus     Fibromyalgia     Hypertension     IBS (irritable bowel syndrome)     Myocardial infarction     Ulcerative colitis      Family History   Problem Relation Name Age of Onset    Colon cancer Mother      Heart attack Father      Heart attack Sister      Diabetes Sister      Breast cancer Sister       Social History     Tobacco Use    Smoking status: Former     Current packs/day: 0.00     Average packs/day: 1 pack/day for 47.3 years (47.3 ttl pk-yrs)     Types: Cigarettes     Start date:      Quit date: 2015     Years since quittin.8    Smokeless tobacco: Never   Substance Use Topics    Alcohol use: No     Alcohol/week: 0.0 standard drinks of alcohol    Drug use: No         Current Outpatient Medications on File Prior to Visit   Medication Sig Dispense Refill    albuterol (VENTOLIN HFA) 90 mcg/actuation inhaler Inhale 1-2 puffs into the lungs every 6 (six) hours as needed for Wheezing or Shortness of Breath (or before exercise). Rescue 18 g 3    allopurinoL (ZYLOPRIM) 100 MG tablet allopurinol 100 mg tablet      aspirin (ECOTRIN) 81 MG EC tablet aspirin low dose 81mg ec      blood sugar diagnostic Strp 1 strip by Misc.(Non-Drug; Combo Route) route 4 (four) times daily. 150 each 11    busPIRone (BUSPAR) 15 MG tablet       cartilage/collagen II/hyaluron (MOVE FREE  ULTRA ORAL) Take 1 tablet by mouth.      cholecalciferol, vitamin D3, (VITAMIN D3) 10 mcg (400 unit) Tab Take 1 capsule by mouth.      cholestyramine (QUESTRAN) 4 gram packet Dissolve 1 packet in water twice a day      ciprofloxacin HCl (CIPRO) 250 MG tablet Take 250 mg by mouth 2 (two) times daily.      clotrimazole-betamethasone 1-0.05% (LOTRISONE) cream Apply topically 2 (two) times daily.      co-enzyme Q-10 30 mg capsule Take 100 mg by mouth once daily.      colestipoL (COLESTID) 1 gram Tab Take by mouth.      diclofenac sodium (VOLTAREN) 1 % Gel diclofenac 1 % topical gel      diphenoxylate-atropine 2.5-0.025 mg (LOMOTIL) 2.5-0.025 mg per tablet   1    dulaglutide (TRULICITY) 1.5 mg/0.5 mL pen injector Inject 1.5 mg into the skin.      DULoxetine (CYMBALTA) 60 MG capsule Take 60 mg by mouth.      EPINEPHrine (EPIPEN) 0.3 mg/0.3 mL AtIn INJECT 0.3 MLS (0.3 MG TOTAL) INTO THE MUSCLE ONE TIME FOR 1 DOSE 2 each 3    exenatide microspheres (BYDUREON) 2 mg/0.65 mL PnIj Inject into the skin every 7 days.      fluticasone propionate (FLONASE) 50 mcg/actuation nasal spray       folic acid (FOLVITE) 1 MG tablet       furosemide (LASIX) 40 MG tablet Take 40 mg by mouth as needed.      glucosamine-chondroitin 500-400 mg tablet Take 1 tablet by mouth once daily.      HYDROcodone-acetaminophen (NORCO) 5-325 mg per tablet Take 1 tablet by mouth every 6 (six) hours as needed for Pain. 28 tablet 0    hydroxychloroquine (PLAQUENIL) 200 mg tablet TAKE 1 TABLET TWICE DAILY 180 tablet 3    INFUSION SET-INSULIN PUMP BODY MISC by Misc.(Non-Drug; Combo Route) route. VINNIE set to replace all insulin      insulin aspart U-100 (NOVOLOG) 100 unit/mL injection Inject 14 Units into the skin 3 (three) times daily before meals. 6 vial 3    insulin syringe-needle U-100 (BD INSULIN SYRINGE ULT-FINE II) 1 mL 31 gauge x 5/16 Syrg 1 applicator by Misc.(Non-Drug; Combo Route) route 4 (four) times daily. 400 each 3    krill-om3-dha-epa-om6-lip-astx  "1,500-165-67.5 mg Cap Take by mouth once daily.      lamoTRIgine (LAMICTAL) 100 MG tablet Take 100 mg by mouth.      lancets 31 gauge Misc 1 lancet by Misc.(Non-Drug; Combo Route) route 4 (four) times daily. 200 each 4    LANTUS SOLOSTAR U-100 INSULIN glargine 100 units/mL (3mL) SubQ pen       LEVEMIR U-100 INSULIN 100 unit/mL injection INJECT 75 UNITS INTO THE SKIN EVERY EVENING. 10 mL 1    levothyroxine (SYNTHROID) 88 MCG tablet       losartan (COZAAR) 50 MG tablet losartan 50 mg tablet      metoprolol succinate (TOPROL-XL) 50 MG 24 hr tablet       mirtazapine (REMERON) 15 MG tablet Take 15 mg by mouth.      multivitamin (THERAGRAN) per tablet Take 1 tablet by mouth once daily.      nabumetone (RELAFEN) 500 MG tablet       nitroGLYCERIN (NITROSTAT) 0.4 MG SL tablet PLACE ONE TABLET UNDER THE TONGUE EVERY 5 MINUTES as needed for chest pain  1    OZEMPIC 0.25 mg or 0.5 mg (2 mg/3 mL) pen injector       pen needle, diabetic 32 gauge x 5/32" Ndle       pravastatin (PRAVACHOL) 20 MG tablet Take 20 mg by mouth once daily.      SOLIQUA 100/33 100 unit-33 mcg/mL InPn pen Inject into the skin.      traZODone (DESYREL) 100 MG tablet Take 100 mg by mouth nightly.      triamcinolone acetonide 0.1% (KENALOG) 0.1 % cream       TRUE METRIX AIR GLUCOSE METER Misc use as directed      TRULICITY 1.5 mg/0.5 mL PnIj INJECT ONE pen SUBCUTANEOUSLY once a week  6    XIFAXAN 550 mg Tab 1 tablet 3 times a day for 2 weeks (42 tablets)      cefadroxil (DURICEF) 500 MG Cap Take 1 capsule (500 mg total) by mouth every 12 (twelve) hours. (Patient not taking: Reported on 3/25/2025) 14 capsule 0     No current facility-administered medications on file prior to visit.       Vitals:    03/25/25 1337   BP: 120/62   Pulse: 83         Physical Exam:    Physical Exam   Constitutional: She appears well-developed and well-nourished.   HENT:   Nose: No septal deviation.   Mouth/Throat: Mucous membranes are normal. No oral lesions.   Eyes: Pupils are " equal, round, and reactive to light. Right conjunctiva is not injected. Left conjunctiva is not injected.   Neck: No JVD present. No thyroid mass and no thyromegaly present.   Cardiovascular: Normal rate, regular rhythm and normal pulses.   No edema   Pulmonary/Chest: Effort normal and breath sounds normal.   Abdominal: Soft. Normal appearance. There is no hepatosplenomegaly.   Musculoskeletal:        Right shoulder: She exhibits normal range of motion, no tenderness and no swelling.        Left shoulder: She exhibits normal range of motion, no tenderness and no swelling.        Right elbow: She exhibits normal range of motion and no swelling. No tenderness found.        Left elbow: She exhibits normal range of motion and no swelling. No tenderness found.        Right wrist: She exhibits normal range of motion, no tenderness and no swelling.        Left wrist: She exhibits normal range of motion, no tenderness and no swelling.        Right hip: She exhibits normal range of motion, normal strength and no swelling.        Left hip: She exhibits normal range of motion, no tenderness and no swelling.        Right knee: She exhibits normal range of motion and no swelling. No tenderness found.        Left knee: She exhibits normal range of motion and no swelling. No tenderness found.        Right ankle: She exhibits normal range of motion and no swelling. No tenderness.        Left ankle: She exhibits normal range of motion and no swelling. No tenderness.        Right hand: She exhibits decreased range of motion, tenderness and deformity.        Left hand: She exhibits decreased range of motion, tenderness and deformity.        Left foot: There is tenderness and swelling.   Patient is a DIP bony hypertrophy with deformity particular the second DIP bilaterally.  She has slight erythema at the third fourth and fifth right DIP   Visible swelling right wrist and 3rd and 4th fingers she has difficulty with finger curl   strength reduced .  +++tenderness at the left ischial bursa.    Lymphadenopathy:     She has no cervical adenopathy.     She has no axillary adenopathy.   Neurological: She has normal strength and normal reflexes.   Skin: Skin is dry and intact.   Psychiatric: She has a normal mood and affect.               Assessment:       Encounter Diagnoses   Name Primary?    Psoriatic arthritis Yes    Inflammatory arthritis     High risk medications (not anticoagulants) long-term use             Plan:        Psoriatic arthritis  -     leflunomide (ARAVA) 20 MG Tab; Take 1 tablet (20 mg total) by mouth once daily.  Dispense: 30 tablet; Refill: 3  -     ALT (SGPT); Future; Expected date: 03/25/2025  -     AST (SGOT); Future; Expected date: 03/25/2025  -     CBC Auto Differential; Future; Expected date: 03/25/2025  -     C-Reactive Protein; Future; Expected date: 03/25/2025  -     Creatinine, Serum; Future; Expected date: 03/25/2025  -     Sedimentation rate; Future; Expected date: 03/25/2025    Inflammatory arthritis  -     leflunomide (ARAVA) 20 MG Tab; Take 1 tablet (20 mg total) by mouth once daily.  Dispense: 30 tablet; Refill: 3  -     ALT (SGPT); Future; Expected date: 03/25/2025  -     AST (SGOT); Future; Expected date: 03/25/2025  -     CBC Auto Differential; Future; Expected date: 03/25/2025  -     C-Reactive Protein; Future; Expected date: 03/25/2025  -     Creatinine, Serum; Future; Expected date: 03/25/2025  -     Sedimentation rate; Future; Expected date: 03/25/2025    High risk medications (not anticoagulants) long-term use  -     ALT (SGPT); Future; Expected date: 03/25/2025  -     AST (SGOT); Future; Expected date: 03/25/2025  -     CBC Auto Differential; Future; Expected date: 03/25/2025  -     C-Reactive Protein; Future; Expected date: 03/25/2025  -     Creatinine, Serum; Future; Expected date: 03/25/2025  -     Sedimentation rate; Future; Expected date: 03/25/2025    Gout, unspecified cause, unspecified  chronicity, unspecified site  -     allopurinoL (ZYLOPRIM) 100 MG tablet; Take 1 tablet (100 mg total) by mouth once daily.  Dispense: 90 tablet; Refill: 3        PsA: Rapid 3: 4.55 (8/2024)   3.22 (2/2024);  4.33 (5/2023), previous 2.72.    Hydroxychloroquine 200mg BID  Enbrel started 8/2021 and  doing better overall. - she stopped due to cost.   We are going to try LFA again with her new insurance if this works will continue and have lab prior to next visit  If she finds LFA costly we will consider trying new biologic and go through the [facility] process.   She is noting increased joint aches and pains.   Reviewed labs. 12/2023: Quant gold neg,   11/2024: hep neg.   No toxicity.     Receiving from Psych               - ok to discontinue Gabapentin      Medical monitoring for toxic and immunosuppressant drugs     Follow up in about 4 months (around 7/25/2025).      F/u 4 months with labs Q3 months    40min consultation with greater than 50% of that time included Preparing to see the patient (review records, tests), Obtaining and/or reviewing separately obtained historical data, Performing a medically appropriate examination and/or evaluation , Ordering medications, tests, and/or procedures, Referring and communicating with other healthcare professionals , Documenting clinical information in the electronic or other health record and Independently interpreting results  (as warranted) & communicating results to the patient/family/caregiver. All questions answered.

## 2025-05-15 ENCOUNTER — TELEPHONE (OUTPATIENT)
Dept: NEUROLOGY | Facility: CLINIC | Age: 75
End: 2025-05-15
Payer: MEDICARE

## 2025-05-15 NOTE — TELEPHONE ENCOUNTER
Spoke with patient and scheduled new patient memory appointment for 10 am on 10/29/25 with Bambi Almaraz.

## 2025-05-15 NOTE — TELEPHONE ENCOUNTER
----- Message from Esther sent at 5/15/2025 11:29 AM CDT -----  Contact: PT  Type:  Apoointment RequestCaller is requesting a sooner appointment.  Caller declined first available appointment listed below.  Caller will not accept being placed on the waitlist and is requesting a message be sent to doctor.Name of Caller:PT When is the first available appointment?N/ASymptoms:MEMORY Would the patient rather a call back or a response via [facility]? CALL Best Call Back Number: [phone] Information: THANK YOU

## 2025-07-28 ENCOUNTER — LAB VISIT (OUTPATIENT)
Dept: LAB | Facility: HOSPITAL | Age: 75
End: 2025-07-28
Attending: INTERNAL MEDICINE
Payer: MEDICARE

## 2025-07-28 DIAGNOSIS — L40.50 PSORIATIC ARTHRITIS: ICD-10-CM

## 2025-07-28 DIAGNOSIS — M19.90 INFLAMMATORY ARTHRITIS: ICD-10-CM

## 2025-07-28 DIAGNOSIS — Z79.899 HIGH RISK MEDICATIONS (NOT ANTICOAGULANTS) LONG-TERM USE: ICD-10-CM

## 2025-07-28 LAB
ABSOLUTE EOSINOPHIL (OHS): 0.2 K/UL
ABSOLUTE MONOCYTE (OHS): 0.81 K/UL (ref 0.3–1)
ABSOLUTE NEUTROPHIL COUNT (OHS): 6.17 K/UL (ref 1.8–7.7)
ALT SERPL W/O P-5'-P-CCNC: 20 UNIT/L (ref 0–55)
AST SERPL-CCNC: 27 UNIT/L (ref 0–50)
BASOPHILS # BLD AUTO: 0.08 K/UL
BASOPHILS NFR BLD AUTO: 0.9 %
CREAT SERPL-MCNC: 0.8 MG/DL (ref 0.5–1.4)
CRP SERPL-MCNC: <0.3 MG/L
ERYTHROCYTE [DISTWIDTH] IN BLOOD BY AUTOMATED COUNT: 12.6 % (ref 11.5–14.5)
ERYTHROCYTE [SEDIMENTATION RATE] IN BLOOD: 17 MM/HR
GFR SERPLBLD CREATININE-BSD FMLA CKD-EPI: >60 ML/MIN/1.73/M2
HCT VFR BLD AUTO: 39.8 % (ref 37–48.5)
HGB BLD-MCNC: 13.3 GM/DL (ref 12–16)
IMM GRANULOCYTES # BLD AUTO: 0.03 K/UL (ref 0–0.04)
IMM GRANULOCYTES NFR BLD AUTO: 0.3 % (ref 0–0.5)
LYMPHOCYTES # BLD AUTO: 2.03 K/UL (ref 1–4.8)
MCH RBC QN AUTO: 31.7 PG (ref 27–31)
MCHC RBC AUTO-ENTMCNC: 33.4 G/DL (ref 32–36)
MCV RBC AUTO: 95 FL (ref 82–98)
NUCLEATED RBC (/100WBC) (OHS): 0 /100 WBC
PLATELET # BLD AUTO: 238 K/UL (ref 150–450)
PMV BLD AUTO: 10.5 FL (ref 9.2–12.9)
RBC # BLD AUTO: 4.2 M/UL (ref 4–5.4)
RELATIVE EOSINOPHIL (OHS): 2.1 %
RELATIVE LYMPHOCYTE (OHS): 21.8 % (ref 18–48)
RELATIVE MONOCYTE (OHS): 8.7 % (ref 4–15)
RELATIVE NEUTROPHIL (OHS): 66.2 % (ref 38–73)
WBC # BLD AUTO: 9.32 K/UL (ref 3.9–12.7)

## 2025-07-28 PROCEDURE — 85651 RBC SED RATE NONAUTOMATED: CPT | Mod: PO

## 2025-07-28 PROCEDURE — 85025 COMPLETE CBC W/AUTO DIFF WBC: CPT

## 2025-07-28 PROCEDURE — 84450 TRANSFERASE (AST) (SGOT): CPT

## 2025-07-28 PROCEDURE — 84460 ALANINE AMINO (ALT) (SGPT): CPT

## 2025-07-28 PROCEDURE — 82565 ASSAY OF CREATININE: CPT

## 2025-07-28 PROCEDURE — 86140 C-REACTIVE PROTEIN: CPT

## 2025-07-28 PROCEDURE — 36415 COLL VENOUS BLD VENIPUNCTURE: CPT | Mod: PO

## 2025-07-30 ENCOUNTER — OFFICE VISIT (OUTPATIENT)
Dept: RHEUMATOLOGY | Facility: CLINIC | Age: 75
End: 2025-07-30
Payer: MEDICARE

## 2025-07-30 VITALS
BODY MASS INDEX: 27.32 KG/M2 | DIASTOLIC BLOOD PRESSURE: 75 MMHG | HEART RATE: 67 BPM | WEIGHT: 164 LBS | SYSTOLIC BLOOD PRESSURE: 153 MMHG | HEIGHT: 65 IN

## 2025-07-30 DIAGNOSIS — M79.7 FIBROMYALGIA: ICD-10-CM

## 2025-07-30 DIAGNOSIS — I25.10 CORONARY ARTERY DISEASE INVOLVING NATIVE CORONARY ARTERY OF NATIVE HEART WITHOUT ANGINA PECTORIS: ICD-10-CM

## 2025-07-30 DIAGNOSIS — M19.90 INFLAMMATORY ARTHRITIS: Primary | ICD-10-CM

## 2025-07-30 PROCEDURE — 99999 PR PBB SHADOW E&M-EST. PATIENT-LVL V: CPT | Mod: PBBFAC,,, | Performed by: INTERNAL MEDICINE

## 2025-07-30 PROCEDURE — 4010F ACE/ARB THERAPY RXD/TAKEN: CPT | Mod: CPTII,S$GLB,, | Performed by: INTERNAL MEDICINE

## 2025-07-30 PROCEDURE — 3288F FALL RISK ASSESSMENT DOCD: CPT | Mod: CPTII,S$GLB,, | Performed by: INTERNAL MEDICINE

## 2025-07-30 PROCEDURE — 1125F AMNT PAIN NOTED PAIN PRSNT: CPT | Mod: CPTII,S$GLB,, | Performed by: INTERNAL MEDICINE

## 2025-07-30 PROCEDURE — 1100F PTFALLS ASSESS-DOCD GE2>/YR: CPT | Mod: CPTII,S$GLB,, | Performed by: INTERNAL MEDICINE

## 2025-07-30 PROCEDURE — 1160F RVW MEDS BY RX/DR IN RCRD: CPT | Mod: CPTII,S$GLB,, | Performed by: INTERNAL MEDICINE

## 2025-07-30 PROCEDURE — 3078F DIAST BP <80 MM HG: CPT | Mod: CPTII,S$GLB,, | Performed by: INTERNAL MEDICINE

## 2025-07-30 PROCEDURE — 99215 OFFICE O/P EST HI 40 MIN: CPT | Mod: S$GLB,,, | Performed by: INTERNAL MEDICINE

## 2025-07-30 PROCEDURE — 1159F MED LIST DOCD IN RCRD: CPT | Mod: CPTII,S$GLB,, | Performed by: INTERNAL MEDICINE

## 2025-07-30 PROCEDURE — 3044F HG A1C LEVEL LT 7.0%: CPT | Mod: CPTII,S$GLB,, | Performed by: INTERNAL MEDICINE

## 2025-07-30 PROCEDURE — 3008F BODY MASS INDEX DOCD: CPT | Mod: CPTII,S$GLB,, | Performed by: INTERNAL MEDICINE

## 2025-07-30 PROCEDURE — 3077F SYST BP >= 140 MM HG: CPT | Mod: CPTII,S$GLB,, | Performed by: INTERNAL MEDICINE

## 2025-07-30 ASSESSMENT — ROUTINE ASSESSMENT OF PATIENT INDEX DATA (RAPID3)
PSYCHOLOGICAL DISTRESS SCORE: 2.2
PATIENT GLOBAL ASSESSMENT SCORE: 4
PAIN SCORE: 4
MDHAQ FUNCTION SCORE: 1.3
TOTAL RAPID3 SCORE: 4.11
FATIGUE SCORE: 1.1

## 2025-07-30 NOTE — PROGRESS NOTES
Subjective:          Chief Complaint: Adina Li is a 75 y.o. female who had concerns including Disease Management.    HPI:    Patient is a 74  year-old female here for f/u of PsA vs EOA (+hx of psoriasis but no active plaques) and fibromyalgia.    Serologies: CCP negative,  rheumatoid factor negative.  Imaging with erosive changes at the capitate and DIP.  Patient with hx of hepatic abscess (multiple) with sepsis strep species. Unclear source of infection.    7/2025: Rapid 3 : 4.11. Doing really well not on LFA, not on Enbrel. Doing well with HCQ. Lost weight. ESR and CRP NORMAL.     3/2025: Rapid 3 4.33.   Patient off Enbrel cost, copay, 2024 with ISR. She had a few samples from me in 8/2024. She is only on HCQ as of now. Hands with stiffness.   Patient of LFA since 2021  cost.   Continues on HCQ  ESR rising over time.     C/o hands with pain left 2nd toe with pain and swelling thinks this is gout. She is on a keto  Lumbar surgery Dr. Linton- 11.2024 lumbar fusion 2nd part. Doing better overall since surgery regarding pain radiating legs.     She hs lost 50# A1C doing better.       2/2024: Rapid 3 : 3.83  Off Enbrel - concern for allergic or ISR.   Joints stable with only HCQ 200mg BID>     9/2023: patient s/p cervical decompression with internal fixation. She is doing much better now post op 5 weeks.   Hands still stiff but better since Enbrel last labs reviewed with patient no evidence of toxicity.     5/2023  Patient with new lateral hip, LBP. In November she missed part of her cruise for leg pain, did have RFA ( Dr. Carlton) no benefit that time. She previous did well with RFAs. Seen with Dr. De Los Santos no significant arthritis in hips noted. She states they have been painful at baseline but the last week following gardening flared hips (Right > Left). Did have toradol with Dr. Nelson last week 0% benefit.   She notes LBP, lateral and anterior thigh, and calf all painful. No numbness,     2/2023: patient  messaged about getting more affordable mediation.     She is on Hydroxychloroquine    She stopped arava in  due to cost.   Methotrexate did not help her.   There is nothing cheaper, but if she cannot get Enbrel we will check into Humira or another biologic just need to finish Enbrel process.    She has been on Enbrel since  and covered. Let's see the process through.      When a recent CT chest and pulmonary function testing overall relatively stable from a lung perspective was given an albuterol inhaler noted to have calcium of the coronaries followed up with her cardiologist.- who felt the stress test was fine. ECHO was fine.     She was seen with Dr. Salazar she would a left carpal tunnel and a right ganglion cyst  and trigger finger release still having some pain in the right hand.   Current regimen:   Enbrel 2021-present   HCQ 200mg BID  Nabumetone 500mg BID     Previous  Arava 10mg daily- discontinued  for cost    No significant benefit with MTX     Now receiving from Psych:   Gabapentin 300mg BID  Cymbalta 60mg BID-       past medical history for type 2 diabetes mellitus, hypertension, IBS, previous myocardial infarction and diverticulitis.  She has DIP pain, swelling and deformity. Some PIP, little MCP and some wrist tenderness. She has long hx of arthritis with bilateral TKA. She has hx of Lumbar DDD/DJD and cervical DDD with [facility]-Dr. Carlton. sulfate or move free. Recently started [place] water and feeling much better.   She is s/p CAD with stenting x 2 and sister with MI  at 43y/o.    Patient with some dry mouth, no dry eyes, no serositis, pleurisy, ILD. ? Psoriasis scalp.  [facility] with cardiac work up no occlusive CAD.2017. Follows regularly with Cardiology        REVIEW OF SYSTEMS:    Review of Systems   Constitutional: Negative for fever, malaise/fatigue and weight loss.   HENT: Negative for sore throat.    Eyes: Negative for double vision, photophobia and redness.    Respiratory: Negative for cough, shortness of breath and wheezing.    Cardiovascular: Negative for chest pain, palpitations and orthopnea.   Gastrointestinal: Negative for abdominal pain, constipation and diarrhea.   Genitourinary: Negative for dysuria, hematuria and urgency.   Musculoskeletal: Positive for back pain and joint pain. Negative for myalgias.   Skin: Negative for rash.   Neurological: Negative for dizziness, tingling, focal weakness and headaches.   Endo/Heme/Allergies: Does not bruise/bleed easily.   Psychiatric/Behavioral: Negative for depression, hallucinations and suicidal ideas.               Objective:            Past Medical History:   Diagnosis Date    Acid reflux     Anxiety     Arthritis     CAD (coronary artery disease)     Stant    Diabetes mellitus     Fibromyalgia     Hypertension     IBS (irritable bowel syndrome)     Myocardial infarction     Ulcerative colitis      Family History   Problem Relation Name Age of Onset    Colon cancer Mother      Heart attack Father      Heart attack Sister      Diabetes Sister      Breast cancer Sister       Social History     Tobacco Use    Smoking status: Former     Current packs/day: 0.00     Average packs/day: 1 pack/day for 47.3 years (47.3 ttl pk-yrs)     Types: Cigarettes     Start date: 1968     Quit date: 5/8/2015     Years since quitting: 10.2    Smokeless tobacco: Never   Substance Use Topics    Alcohol use: No     Alcohol/week: 0.0 standard drinks of alcohol    Drug use: No         Current Outpatient Medications on File Prior to Visit   Medication Sig Dispense Refill    albuterol (VENTOLIN HFA) 90 mcg/actuation inhaler Inhale 1-2 puffs into the lungs every 6 (six) hours as needed for Wheezing or Shortness of Breath (or before exercise). Rescue 18 g 3    allopurinoL (ZYLOPRIM) 100 MG tablet allopurinol 100 mg tablet      allopurinoL (ZYLOPRIM) 100 MG tablet Take 1 tablet (100 mg total) by mouth once daily. 90 tablet 3    aspirin (ECOTRIN) 81  MG EC tablet aspirin low dose 81mg ec      blood sugar diagnostic Strp 1 strip by Misc.(Non-Drug; Combo Route) route 4 (four) times daily. 150 each 11    busPIRone (BUSPAR) 15 MG tablet       cartilage/collagen II/hyaluron (MOVE FREE ULTRA ORAL) Take 1 tablet by mouth.      cholecalciferol, vitamin D3, (VITAMIN D3) 10 mcg (400 unit) Tab Take 1 capsule by mouth.      cholestyramine (QUESTRAN) 4 gram packet Dissolve 1 packet in water twice a day      ciprofloxacin HCl (CIPRO) 250 MG tablet Take 250 mg by mouth 2 (two) times daily.      clotrimazole-betamethasone 1-0.05% (LOTRISONE) cream Apply topically 2 (two) times daily.      co-enzyme Q-10 30 mg capsule Take 100 mg by mouth once daily.      colestipoL (COLESTID) 1 gram Tab Take by mouth.      diclofenac sodium (VOLTAREN) 1 % Gel diclofenac 1 % topical gel      diphenoxylate-atropine 2.5-0.025 mg (LOMOTIL) 2.5-0.025 mg per tablet   1    dulaglutide (TRULICITY) 1.5 mg/0.5 mL pen injector Inject 1.5 mg into the skin.      DULoxetine (CYMBALTA) 60 MG capsule Take 60 mg by mouth.      EPINEPHrine (EPIPEN) 0.3 mg/0.3 mL AtIn INJECT 0.3 MLS (0.3 MG TOTAL) INTO THE MUSCLE ONE TIME FOR 1 DOSE 2 each 3    exenatide microspheres (BYDUREON) 2 mg/0.65 mL PnIj Inject into the skin every 7 days.      fluticasone propionate (FLONASE) 50 mcg/actuation nasal spray       folic acid (FOLVITE) 1 MG tablet       furosemide (LASIX) 40 MG tablet Take 40 mg by mouth as needed.      glucosamine-chondroitin 500-400 mg tablet Take 1 tablet by mouth once daily.      HYDROcodone-acetaminophen (NORCO) 5-325 mg per tablet Take 1 tablet by mouth every 6 (six) hours as needed for Pain. 28 tablet 0    hydroxychloroquine (PLAQUENIL) 200 mg tablet TAKE 1 TABLET TWICE DAILY 180 tablet 3    INFUSION SET-INSULIN PUMP BODY MISC by Misc.(Non-Drug; Combo Route) route. VINNIE set to replace all insulin      insulin aspart U-100 (NOVOLOG) 100 unit/mL injection Inject 14 Units into the skin 3 (three) times  "daily before meals. 6 vial 3    insulin syringe-needle U-100 (BD INSULIN SYRINGE ULT-FINE II) 1 mL 31 gauge x 5/16 Syrg 1 applicator by Misc.(Non-Drug; Combo Route) route 4 (four) times daily. 400 each 3    krill-om3-dha-epa-om6-lip-astx 1,500-165-67.5 mg Cap Take by mouth once daily.      lamoTRIgine (LAMICTAL) 100 MG tablet Take 100 mg by mouth.      lancets 31 gauge Misc 1 lancet by Misc.(Non-Drug; Combo Route) route 4 (four) times daily. 200 each 4    LANTUS SOLOSTAR U-100 INSULIN glargine 100 units/mL (3mL) SubQ pen       LEVEMIR U-100 INSULIN 100 unit/mL injection INJECT 75 UNITS INTO THE SKIN EVERY EVENING. 10 mL 1    levothyroxine (SYNTHROID) 88 MCG tablet       losartan (COZAAR) 50 MG tablet losartan 50 mg tablet      metoprolol succinate (TOPROL-XL) 50 MG 24 hr tablet       mirtazapine (REMERON) 15 MG tablet Take 15 mg by mouth.      multivitamin (THERAGRAN) per tablet Take 1 tablet by mouth once daily.      nabumetone (RELAFEN) 500 MG tablet       nitroGLYCERIN (NITROSTAT) 0.4 MG SL tablet PLACE ONE TABLET UNDER THE TONGUE EVERY 5 MINUTES as needed for chest pain  1    OZEMPIC 0.25 mg or 0.5 mg (2 mg/3 mL) pen injector       pen needle, diabetic 32 gauge x 5/32" Ndle       pravastatin (PRAVACHOL) 20 MG tablet Take 20 mg by mouth once daily.      SOLIQUA 100/33 100 unit-33 mcg/mL InPn pen Inject into the skin.      traZODone (DESYREL) 100 MG tablet Take 100 mg by mouth nightly.      triamcinolone acetonide 0.1% (KENALOG) 0.1 % cream       TRUE METRIX AIR GLUCOSE METER Misc use as directed      TRULICITY 1.5 mg/0.5 mL PnIj INJECT ONE pen SUBCUTANEOUSLY once a week  6    XIFAXAN 550 mg Tab 1 tablet 3 times a day for 2 weeks (42 tablets)      cefadroxil (DURICEF) 500 MG Cap Take 1 capsule (500 mg total) by mouth every 12 (twelve) hours. (Patient not taking: Reported on 9/1/2022) 14 capsule 0    leflunomide (ARAVA) 20 MG Tab Take 1 tablet (20 mg total) by mouth once daily. 30 tablet 3     No current " facility-administered medications on file prior to visit.       Vitals:    07/30/25 1328   BP: (!) 153/75   Pulse: 67         Physical Exam:    Physical Exam   Constitutional: She appears well-developed and well-nourished.   HENT:   Nose: No septal deviation.   Mouth/Throat: Mucous membranes are normal. No oral lesions.   Eyes: Pupils are equal, round, and reactive to light. Right conjunctiva is not injected. Left conjunctiva is not injected.   Neck: No JVD present. No thyroid mass and no thyromegaly present.   Cardiovascular: Normal rate, regular rhythm and normal pulses.   No edema   Pulmonary/Chest: Effort normal and breath sounds normal.   Abdominal: Soft. Normal appearance. There is no hepatosplenomegaly.   Musculoskeletal:        Right shoulder: She exhibits normal range of motion, no tenderness and no swelling.        Left shoulder: She exhibits normal range of motion, no tenderness and no swelling.        Right elbow: She exhibits normal range of motion and no swelling. No tenderness found.        Left elbow: She exhibits normal range of motion and no swelling. No tenderness found.        Right wrist: She exhibits normal range of motion, no tenderness and no swelling.        Left wrist: She exhibits normal range of motion, no tenderness and no swelling.        Right hip: She exhibits normal range of motion, normal strength and no swelling.        Left hip: She exhibits normal range of motion, no tenderness and no swelling.        Right knee: She exhibits normal range of motion and no swelling. No tenderness found.        Left knee: She exhibits normal range of motion and no swelling. No tenderness found.        Right ankle: She exhibits normal range of motion and no swelling. No tenderness.        Left ankle: She exhibits normal range of motion and no swelling. No tenderness.        Right hand: She exhibits decreased range of motion, tenderness and deformity.        Left hand: She exhibits decreased range of  motion, tenderness and deformity.        Left foot: There is tenderness and swelling.   Patient is a DIP bony hypertrophy with deformity particular the second DIP bilaterally.  She has slight erythema at the third fourth and fifth right DIP   Visible swelling right wrist and 3rd and 4th fingers she has difficulty with finger curl  strength reduced .  +++tenderness at the left ischial bursa.    Lymphadenopathy:     She has no cervical adenopathy.     She has no axillary adenopathy.   Neurological: She has normal strength and normal reflexes.   Skin: Skin is dry and intact.   Psychiatric: She has a normal mood and affect.               Assessment:       Encounter Diagnoses   Name Primary?    Inflammatory arthritis Yes    Coronary artery disease involving native coronary artery of native heart without angina pectoris     Fibromyalgia          Plan:        Inflammatory arthritis    Coronary artery disease involving native coronary artery of native heart without angina pectoris  -     Ambulatory referral/consult to Cardiology; Future; Expected date: 08/06/2025    Fibromyalgia      PsA vs seronegative RA:   Hydroxychloroquine 200mg BID  Enbrel started 8/2021 and  doing better overall. - she stopped due to cost.   Off LFA     CAD - recent carotid US and wanting new cardiologist    FMS: Cymbalta     Medical monitoring for toxic and immunosuppressant drugs     No follow-ups on file.      F/u 6 months with labs Q3 months    40min consultation with greater than 50% of that time included Preparing to see the patient (review records, tests), Obtaining and/or reviewing separately obtained historical data, Performing a medically appropriate examination and/or evaluation , Ordering medications, tests, and/or procedures, Referring and communicating with other healthcare professionals , Documenting clinical information in the electronic or other health record and Independently interpreting results  (as warranted) & communicating  results to the patient/family/caregiver. All questions answered.